# Patient Record
Sex: MALE | Race: WHITE | Employment: OTHER | URBAN - METROPOLITAN AREA
[De-identification: names, ages, dates, MRNs, and addresses within clinical notes are randomized per-mention and may not be internally consistent; named-entity substitution may affect disease eponyms.]

---

## 2017-04-28 ENCOUNTER — MYC MEDICAL ADVICE (OUTPATIENT)
Dept: FAMILY MEDICINE | Facility: CLINIC | Age: 67
End: 2017-04-28

## 2017-04-28 DIAGNOSIS — N50.811 TESTICULAR PAIN, RIGHT: Primary | ICD-10-CM

## 2017-05-01 NOTE — TELEPHONE ENCOUNTER
I see surgery in 04 on the left testicle to reduce a torsion, but do not see any notes regarding right testicular surgery.  Would you want to see patient in clinic or refer on?  Would this be a urology referral?  I have pended this.  Candie Merrill RN

## 2017-05-02 ENCOUNTER — PRE VISIT (OUTPATIENT)
Dept: UROLOGY | Facility: CLINIC | Age: 67
End: 2017-05-02

## 2017-05-02 ENCOUNTER — TELEPHONE (OUTPATIENT)
Dept: UROLOGY | Facility: CLINIC | Age: 67
End: 2017-05-02

## 2017-05-02 NOTE — TELEPHONE ENCOUNTER
Called and spoke to patient and spouse. Patient rescheduled to see Dr. Garland on 5/22/17 at 9:30am at [place].     PREVISIT INFORMATION                                                    Mauricio Barrera scheduled for future visit at [facility] specialty clinics.    Patient is scheduled to see Dr. Garland on 5/22/17 at 9:30am  Reason for visit: testicular pain, history of torsion  Referring provider: Leon Hinojosa MD  Has patient seen previous specialist? Yes. 15 year ago Clinic/Facility [facility] urology.   Medical Records:  some records available in EPIC. per patient, no outside records    REVIEW                                                      New patient packet mailed to patient: No  Medication reconciliation complete: No      Current Outpatient Prescriptions   Medication Sig Dispense Refill     acetaminophen (TYLENOL) 325 MG tablet Take 2 tablets (650 mg) by mouth every 4 hours as needed for mild pain 100 tablet 0     amLODIPine (NORVASC) 5 MG tablet Take 1 tablet (5 mg) by mouth daily 30 tablet 0     lisinopril (PRINIVIL,ZESTRIL) 10 MG tablet Take 1 tablet (10 mg) by mouth daily 90 tablet 3     simvastatin (ZOCOR) 20 MG tablet Take 1 tablet (20 mg) by mouth At Bedtime 90 tablet 3       Allergies: No known drug allergies    PLAN/FOLLOW-UP NEEDED                                                      Previsit review complete.  Patient will see provider at future scheduled appointment.       Haily Morales  General Surgery - Urology RN

## 2017-05-02 NOTE — TELEPHONE ENCOUNTER
[facility] Call Center    Phone Message    Name of Caller: Cruz    Phone Number: Home number on file [phone] (home)    Best time to return call: any    May a detailed message be left on voicemail: yes    Relation to patient: Self    Reason for Call: Other: Can patiemt be seen sooner in [place]?  Scheduled right now at [facility].  Was told to see Dada, not Ayla.  Testicular pain, seeing Dada at [facility], new, 6/2/17.  Please call patient to discuss.      Action Taken: Message routed to:  Adult Clinics: Urology p [id number]

## 2017-05-22 ENCOUNTER — OFFICE VISIT (OUTPATIENT)
Dept: UROLOGY | Facility: CLINIC | Age: 67
End: 2017-05-22
Payer: COMMERCIAL

## 2017-05-22 VITALS
DIASTOLIC BLOOD PRESSURE: 98 MMHG | OXYGEN SATURATION: 95 % | HEART RATE: 75 BPM | SYSTOLIC BLOOD PRESSURE: 149 MMHG | TEMPERATURE: 97.5 F

## 2017-05-22 DIAGNOSIS — N50.82 SCROTAL PAIN: Primary | ICD-10-CM

## 2017-05-22 PROCEDURE — 99203 OFFICE O/P NEW LOW 30 MIN: CPT | Performed by: UROLOGY

## 2017-05-22 ASSESSMENT — PAIN SCALES - GENERAL: PAINLEVEL: NO PAIN (0)

## 2017-05-22 NOTE — MR AVS SNAPSHOT
Mauricio Barrera     (MR # [id number])              After Visit Summary      Visit Information     Date & Time Provider Department Encounter #    5/22/2017  9:30 AM Vaughn Garland MD [facility] [id number]      Vitals  Most recent update: 5/22/2017  9:47 AM by Macie Moyer CMA    BP Pulse Temp SpO2          (!) 149/98 (BP Location: Left arm, Patient Position: Chair, Cuff Size: Adult Large) 75 97.5  F (36.4  C) (Oral) 95%        Reason for visit     Consult testicular pain    Reason for Visit History      Diagnoses        Codes Comments    Scrotal pain    -  Primary N50.82       Orders     Future Labs/Procedures Expected by Expires    US Testicular and Scrotum [PBE592 Custom]  As directed 5/22/2018      Appointments for Next 1 Year     None      Follow-up and Disposition     Return if symptoms worsen or fail to improve.    Routing History    Follow-up and Disposition History      Health Maintenance Due     Health Maintenance Due   Topic Date Due    A1C Q6 MO( NO INBASKET)  05/16/2017    LIPID MONITORING Q6 MO( NO INBASKET)  05/16/2017             Ongoing Health Issues     Problem Noted Resolved    Hyperlipidemia with target LDL less than 130[244307]      Hypertension goal BP (blood pressure) < 140/90[623596] 3/15/2011     Torsion, testicular[191056] 11/22/2013     Peripheral neuropathy (H)[524922] 3/6/2014     Toe ulcer (H)[623798] 3/11/2014     Impaired fasting glucose[790.21.ICD-9-CM] 3/11/2014     Obesity[830602] 3/12/2014     Advanced directives, counseling/discussion[962339] 1/19/2015     Foot osteomyelitis, right (H)[396111] 9/21/2016     Health Care Home[80665] 9/29/2016       Patient Instructions    Please call [phone] to schedule your ultrasound.         IMPORTANT INFORMATION  For all doctor appointments, please bring your medications in their original containers .   For all prescription refills please contact your pharmacy directly one week prior to your last dose and  request a refill. The pharmacy will then contact us.  If you have any lab tests ordered please call [phone] to schedule a lab appointment.  To contact [facility] call [phone] / for [facility] in [facility] call [phone] / for [facility] call [phone].    Pharmacy:    [facility] [id number] - [place], [place] - [address] [facility] [place], [place] - [address]  [facility] - [place], [place] - [address] [facility] [id number] [place], [place] - [address] [facility]    * * * * * * * PATIENT'S COPY* * * * * * * * * *

## 2017-05-22 NOTE — NURSING NOTE
"Mauricio Barrera's goals for this visit include:   Chief Complaint   Patient presents with     Consult     testicular pain       He requests these members of his care team be copied on today's visit information: PCP- YES     Referring Provider:  No referring provider defined for this encounter.    Initial BP (!) 149/98 (BP Location: Left arm, Patient Position: Chair, Cuff Size: Adult Large)  Pulse 75  Temp 97.5  F (36.4  C) (Oral)  SpO2 95% Estimated body mass index is 36.14 kg/(m^2) as calculated from the following:    Height as of 11/16/16: 1.969 m (6' 5.5\").    Weight as of 11/16/16: 140 kg (308 lb 12 oz).  BP completed using cuff size: X-large        "

## 2017-05-22 NOTE — PROGRESS NOTES
I am seeing Mauricio Barrera in consultation for evaluation of testis pain .    HPI:  Mauricio Barrera is a 66 year old male with history of right undescended testis.    Had right inguinal hernia repair age and orchidopexy age 14.    Also had history of torsion surgery in this testis, about 15 years ago.    About 3 weeks ago had intermittent moderate pain in the right testis that lasted 8-10d.  4-5d ago, pain was 1-2/10, twice that day.  Next day 2-3 episode mild.    Lasts 1-2 minutes.    Today feels fine, no pain at present.    REVIEW OF SYSTEMS:  General: negative  Skin: negative  Eyes: negative  Ears/Nose/Throat: negative  Respiratory: negative  Cardiovascular: negative  Gastrointestinal: negative  Genitourinary: see HPI  Musculoskeletal: negative  Neurologic: negative  Psychiatric: negative  Hematologic/Lymphatic/Immunologic: negative  Endocrine: negative    PAST MEDICAL HX:  Past Medical History:   Diagnosis Date     Calculus of gallbladder without mention of cholecystitis or obstruction 2006    screened,  neg for AAA,,Crtd stnss,PAD     Hyperlipidemia LDL goal < 130      Hypertension      Incidental lung nodule, > 3mm and < 8mm 2/04    3.5 mm nodule right lung base - stable by CT x 2 years     Lumbago        PAST SURG HX:  Past Surgical History:   Procedure Laterality Date     AMPUTATE FOOT Right 9/22/2016    Procedure: AMPUTATE FOOT;  Surgeon: Melchor Auguste DPM;  Location: [facility] OR     AMPUTATE FOOT Right 9/24/2016    Procedure: AMPUTATE FOOT;  Surgeon: Venkat Ramos DPM;  Location: [facility] OR     AMPUTATE TOE(S)  5/13/2014    Procedure: AMPUTATE TOE(S);  Surgeon: Venkat Ramos DPM;  Location: [facility]     C NONSPECIFIC PROCEDURE      tonsillectomy     C NONSPECIFIC PROCEDURE      hernia surgery     C NONSPECIFIC PROCEDURE  5/04    reduction torsion left testis     HC COLONOSCOPY THRU STOMA, DIAGNOSTIC  3/03    diverticulae,some inflamed,hemorrhoids     HEMORRHOIDECTOMY  4/07    single,anterior      IRRIGATION AND DEBRIDEMENT FOOT, COMBINED Right 9/22/2016    Procedure: COMBINED IRRIGATION AND DEBRIDEMENT FOOT;  Surgeon: Melchor Auguste DPM;  Location:  OR     REPAIR HAMMER TOE  5/13/2014    Procedure: REPAIR HAMMER TOE;  Surgeon: Venkat Ramos DPM;  Location: [facility]        FAMILY HX:  Family History   Problem Relation Age of Onset     C.A.D. Father      50's     CANCER Mother      Would not permit any exams or investigation,so dx inferred by family     Respiratory Brother      COPD, smoker     Family History Negative Brother        SOCIAL HX:  Social History   Substance Use Topics     Smoking status: Former Smoker     Types: Cigars     Quit date: 1/1/2004     Smokeless tobacco: Never Used     Alcohol use Yes      Comment: rarely        MEDICATIONS:  Current Outpatient Prescriptions   Medication Sig     acetaminophen (TYLENOL) 325 MG tablet Take 2 tablets (650 mg) by mouth every 4 hours as needed for mild pain     amLODIPine (NORVASC) 5 MG tablet Take 1 tablet (5 mg) by mouth daily     lisinopril (PRINIVIL,ZESTRIL) 10 MG tablet Take 1 tablet (10 mg) by mouth daily     simvastatin (ZOCOR) 20 MG tablet Take 1 tablet (20 mg) by mouth At Bedtime     No current facility-administered medications for this visit.      ALLERGIES:  No known drug allergies    GENERAL PHYSICAL EXAM:   BP (!) 149/98 (BP Location: Left arm, Patient Position: Chair, Cuff Size: Adult Large)  Pulse 75  Temp 97.5  F (36.4  C) (Oral)  SpO2 95%   Constitutional: No acute distress. Well nourished.   PSYCH: normal mood and affect.  NEURO: normal gait, no focal deficits.   EYES: anicteric, EOMI, PERR.  CARDIOPULMONARY: breathing non-labored, pulse regular rate/rhythm, no peripheral edema.  GI: Abdomen soft, non-tender, no organomegaly.  MUSCULOSKELETAL: normal limb proportions, no muscle wasting, no contractures.     EXAM:  Phallus  circumcised, meatus adequate, no plaques palpated.   Left testis descended , size is 16 , consistency  is normal . No intra-testicular masses.   Right testis descended , size is 20 , consistency is normal . No intra-testicular masses.   Epididymes present, non-tender, not-enlarged.   Left cord: Vas present. no varicocele noted.  Right cord: Vas present. no varicocele noted.     Prostate exam: deferred     Imaging/labs:  Lab Results   Component Value Date    CR 0.98 09/27/2016    CR 1.07 09/26/2016    CR 1.12 09/25/2016     Lab Results   Component Value Date    PSA 5.4 11/20/2015    PSA 4.24 11/04/2015       ASSESSMENT:     Resolved right testis pain, history of undescended testis, history of torsion.    Mild PSA elevation, he is observing.  Age-adjusted normal range is < 4.5.  Declines prostate biopsy at this time    PLAN:    Scrotal ultrasound.    Testicular self exam advised.    Recheck PSA in 6-12 months.    Vaughn Garland MD  Urological Surgeon

## 2017-05-22 NOTE — MR AVS SNAPSHOT
Patient's Medication List   [facility] in [place]       Patient:  JENARO LORA   :  1950   Physician:  Leon Hinojosa MD           This is your record.  Keep this with you and show to your community pharmacist(s) and physician(s) at each visit.     Allergies:  NO KNOWN DRUG ALLERGIES - (reactions not documented)               Medications  Valid as of: May 23, 2017 - 12:18 AM    Generic Name Brand Name Tablet Size Instructions for use    Acetaminophen TYLENOL 325 MG Take 2 tablets (650 mg) by mouth every 4 hours as needed for mild pain        AmLODIPine Besylate NORVASC 5 MG Take 1 tablet (5 mg) by mouth daily        Lisinopril PRINIVIL/ZESTRIL 10 MG Take 1 tablet (10 mg) by mouth daily        Simvastatin ZOCOR 20 MG Take 1 tablet (20 mg) by mouth At Bedtime        .           .           .           .

## 2017-06-22 ENCOUNTER — TELEPHONE (OUTPATIENT)
Dept: FAMILY MEDICINE | Facility: CLINIC | Age: 67
End: 2017-06-22

## 2017-06-22 NOTE — TELEPHONE ENCOUNTER
PATIENT COMPLAINS OF :  --Persistent chest congestion and episodic cough x 3 weeks.  --Feels more tired than usual but does not feel very sick.  --He is able to sleep.  --Drinking fluids well.  --Urine output ok.  --No fever.  --No colored mucus.  --No difficulty breathing.  --No chest pain.  --No pain.    PLAN :   --Because sx are persistent we did schedule an appointment for him with his primary for next week.  --He wanted this appointment for next week and will call if he does not need it.  --Both protocols recommended home care and call back if sx persist.  --Reviewed home care from protocol.    Protocol used - Cough.  Congestion.  Mandy Chavez Telephone Triage Protocols For Nurses 5th Edition guideline.    Sana Ambrosio RN

## 2017-06-28 ENCOUNTER — OFFICE VISIT (OUTPATIENT)
Dept: FAMILY MEDICINE | Facility: CLINIC | Age: 67
End: 2017-06-28
Payer: COMMERCIAL

## 2017-06-28 VITALS
OXYGEN SATURATION: 96 % | DIASTOLIC BLOOD PRESSURE: 88 MMHG | TEMPERATURE: 98 F | HEART RATE: 76 BPM | HEIGHT: 78 IN | BODY MASS INDEX: 34.57 KG/M2 | WEIGHT: 298.75 LBS | SYSTOLIC BLOOD PRESSURE: 128 MMHG

## 2017-06-28 DIAGNOSIS — J20.9 ACUTE BRONCHITIS, UNSPECIFIED ORGANISM: Primary | ICD-10-CM

## 2017-06-28 PROCEDURE — 99213 OFFICE O/P EST LOW 20 MIN: CPT | Performed by: FAMILY MEDICINE

## 2017-06-28 RX ORDER — AZITHROMYCIN 250 MG/1
TABLET, FILM COATED ORAL
Qty: 6 TABLET | Refills: 0 | Status: SHIPPED | OUTPATIENT
Start: 2017-06-28 | End: 2017-10-05

## 2017-06-28 NOTE — PROGRESS NOTES
SUBJECTIVE:                                                    Mauricio Barrera is a 66 year old male who presents to clinic today for the following health issues:    Acute Illness:    Acute illness concerns: cough  Onset: 3 weeks ago     Fever: YES- first week    Chills/Sweats: YES- sweats     Headache (location?): no    Sinus Pressure:YES- post-nasal drainage and facial pain    Conjunctivitis:  no    Ear Pain: no    Rhinorrhea: no     Congestion: YES    Sore Throat: YES- first day      Cough: YES-productive of clear sputum    Wheeze: no    Decreased Appetite: no    Nausea: no    Vomiting: no    Diarrhea:  no    Dysuria/Freq.: no    Fatigue/Achiness: YES- fatigue     Sick/Strep Exposure: no     Therapies Tried and outcome: daytime and night time and advil cold and sinus and throat lozenges   Additional: came back from [place] 3 months ago     Residual cough. Head is clearing.   Around 3 weeks ago symptoms started.   Still some sinus and ear symptoms.   No fever right now but first week - some on and off fever.   No medication. Using otc cough syrup.     Problem list and histories reviewed & adjusted, as indicated.  Additional history: as documented    Labs reviewed in EPIC.     Reviewed and updated as needed this visit by clinical staff    Reviewed and updated as needed this visit by Provider    Social History     Social History     Marital status:      Spouse name: Cathleen     Number of children: 1     Years of education: N/A     Social History Main Topics     Smoking status: Former Smoker     Types: Cigars     Quit date: 1/1/2004     Smokeless tobacco: Never Used     Alcohol use Yes      Comment: rarely      Drug use: No     Sexual activity: Yes     Partners: Female     Other Topics Concern      Service No     Blood Transfusions No     Caffeine Concern No     Occupational Exposure No     Hobby Hazards No     Sleep Concern No      irregular sleep      Stress Concern No     Weight Concern Yes      "Exercise Yes     10 times monthly     Seat Belt Yes     Self-Exams No     Parent/Sibling W/ Cabg, Mi Or Angioplasty Before 65f 55m? No     Social History Narrative    Balanced Diet - Yes    Osteoporosis Preventative measures-  Dairy servings per day: 2    Regular Exercise -  yes Describe walking 4 times per week    Dental Exam up - YES - Date: 3/07    Eye Exam -5/07    Self Testicular Exam -  No    Do you have any concerns about STD's -  No    Abuse: Current or Past (Physical, Sexual or Emotional)- No    Do you feel safe in your environment - Yes    Guns stored in the home - No    Sunscreen used - yes    Seatbelts used - Yes    Lipids - 04/07    Glucose -  4/07    Colon Cancer Screening - Colonoscopy 03-(date completed)    Hemoccults - 02-192003    PSA - 2005    Digital Rectal Exam - 02-    Immunizations reviewed and up to date - yes,utd    RYAN Murrieta     Allergies   Allergen Reactions     No Known Drug Allergies      Patient Active Problem List   Diagnosis     Hyperlipidemia with target LDL less than 130     Hypertension goal BP (blood pressure) < 140/90     Torsion, testicular     Peripheral neuropathy (H)     Toe ulcer (H)     Impaired fasting glucose     Obesity     Advanced directives, counseling/discussion     Foot osteomyelitis, right (H)     Health Care Home     Reviewed medications, social history and  past medical and surgical history.    Review of system: for general, respiratory, CVS, GI and psychiatry negative except for noted above.     EXAM:  /88 (Cuff Size: Adult Large)  Pulse 76  Temp 98  F (36.7  C) (Oral)  Ht 6' 5.5\" (1.969 m)  Wt 298 lb 12 oz (135.5 kg)  SpO2 96%  BMI 34.97 kg/m2  Constitutional: healthy, alert and no distress   Psychiatric: mentation appears normal and affect normal/bright  Cardiovascular: RRR. No murmurs,  Respiratory: negative, Lungs clear. No crackles or wheezing. No tachypnea.     ENT: Both TM exam normal, minimal cervical lymphadenopathy. " Mild maxillary sinus tenderness.        ASSESSMENT / PLAN:  (J20.9) Acute bronchitis, unspecified organism  (primary encounter diagnosis)  Comment: from symptoms. Start with antibiotics and if not improving, OK to call for prednisone. Side effects discussed. Discussed when to follow up.  Plan: azithromycin (ZITHROMAX) 250 MG tablet

## 2017-06-28 NOTE — NURSING NOTE
"Chief Complaint   Patient presents with     Cough       Initial /88 (Cuff Size: Adult Large)  Pulse 76  Temp 98  F (36.7  C) (Oral)  Ht 6' 5.5\" (1.969 m)  Wt 298 lb 12 oz (135.5 kg)  SpO2 96%  BMI 34.97 kg/m2 Estimated body mass index is 34.97 kg/(m^2) as calculated from the following:    Height as of this encounter: 6' 5.5\" (1.969 m).    Weight as of this encounter: 298 lb 12 oz (135.5 kg).  Medication Reconciliation: complete     Anita Hunt, AGNES      "

## 2017-06-28 NOTE — MR AVS SNAPSHOT
"              After Visit Summary   6/28/2017    Mauricio Barrera    MRN: [id number]           Patient Information     Date Of Birth          1950        Visit Information        Provider Department      6/28/2017 1:20 PM Leon Hinojosa MD [facility]        Today's Diagnoses     Acute bronchitis, unspecified organism    -  1       Follow-ups after your visit        Who to contact     If you have questions or need follow up information about today's clinic visit or your schedule please contact [facility] directly at [phone].  Normal or non-critical lab and imaging results will be communicated to you by Icinetichart, letter or phone within 4 business days after the clinic has received the results. If you do not hear from us within 7 days, please contact the clinic through Icinetichart or phone. If you have a critical or abnormal lab result, we will notify you by phone as soon as possible.  Submit refill requests through Nomadesk or call your pharmacy and they will forward the refill request to us. Please allow 3 business days for your refill to be completed.          Additional Information About Your Visit        MyChart Information     [employer] gives you secure access to your electronic health record. If you see a primary care provider, you can also send messages to your care team and make appointments. If you have questions, please call your primary care clinic.  If you do not have a primary care provider, please call [phone] and they will assist you.        Care EveryWhere ID     This is your Care EveryWhere ID. This could be used by other organizations to access your [place] medical records  [id number]        Your Vitals Were     Pulse Temperature Height Pulse Oximetry BMI (Body Mass Index)       76 98  F (36.7  C) (Oral) 6' 5.5\" (1.969 m) 96% 34.97 kg/m2        Blood Pressure from Last 3 Encounters:   06/28/17 128/88   05/22/17 (!) 149/98   11/16/16 132/86    Weight " from Last 3 Encounters:   06/28/17 298 lb 12 oz (135.5 kg)   11/16/16 (!) 308 lb 12 oz (140 kg)   11/16/16 295 lb (133.8 kg)              Today, you had the following     No orders found for display         Today's Medication Changes          These changes are accurate as of: 6/28/17 11:59 PM.  If you have any questions, ask your nurse or doctor.               Start taking these medicines.        Dose/Directions    azithromycin 250 MG tablet   Commonly known as:  ZITHROMAX   Used for:  Acute bronchitis, unspecified organism   Started by:  Leon Hinojosa MD        Two tablets first day, then one tablet daily for four days.   Quantity:  6 tablet   Refills:  0            Where to get your medicines      These medications were sent to [facility] [place] [address]  [address] [place], [place] [address]     Phone:  [phone]     azithromycin 250 MG tablet                Primary Care Provider Office Phone # Fax #    Leon Hinojosa [phone]       [address] [address]        Equal Access to Services     ALEXSANDRA ALLEN AH: Hadii melvin phano Sojusten, waaxda lujacquesadaha, qaybta kaalmada adefloridayada, nancy abbott . So [facility] [phone].    ATENCIÓN: Si habla español, tiene a crawford disposición servicios gratuitos de asistencia lingüística. Llame al [phone].    We comply with applicable federal civil rights laws and [place] laws. We do not discriminate on the basis of race, color, national origin, age, disability sex, sexual orientation or gender identity.            Thank you!     Thank you for choosing [facility]  for your care. Our goal is always to provide you with excellent care. Hearing back from our patients is one way we can continue to improve our services. Please take a few minutes to complete the written survey that you may receive in the mail after your  visit with us. Thank you!             Your Updated Medication List - Protect others around you: Learn how to safely use, store and throw away your medicines at www.disposemymeds.org.          This list is accurate as of: 6/28/17 11:59 PM.  Always use your most recent med list.                   Brand Name Dispense Instructions for use Diagnosis    acetaminophen 325 MG tablet    TYLENOL    100 tablet    Take 2 tablets (650 mg) by mouth every 4 hours as needed for mild pain    Osteomyelitis of right foot, unspecified chronicity       amLODIPine 5 MG tablet    NORVASC    30 tablet    Take 1 tablet (5 mg) by mouth daily    Osteomyelitis of right foot, unspecified chronicity       azithromycin 250 MG tablet    ZITHROMAX    6 tablet    Two tablets first day, then one tablet daily for four days.    Acute bronchitis, unspecified organism       lisinopril 10 MG tablet    PRINIVIL/ZESTRIL    90 tablet    Take 1 tablet (10 mg) by mouth daily    Hypertension goal BP (blood pressure) < 140/90       simvastatin 20 MG tablet    ZOCOR    90 tablet    Take 1 tablet (20 mg) by mouth At Bedtime    Hyperlipidemia LDL goal < 130

## 2017-10-05 ENCOUNTER — OFFICE VISIT (OUTPATIENT)
Dept: OPHTHALMOLOGY | Facility: CLINIC | Age: 67
End: 2017-10-05
Attending: OPHTHALMOLOGY
Payer: COMMERCIAL

## 2017-10-05 ENCOUNTER — OFFICE VISIT (OUTPATIENT)
Dept: FAMILY MEDICINE | Facility: CLINIC | Age: 67
End: 2017-10-05
Payer: COMMERCIAL

## 2017-10-05 VITALS
HEART RATE: 78 BPM | DIASTOLIC BLOOD PRESSURE: 66 MMHG | WEIGHT: 306 LBS | BODY MASS INDEX: 35.82 KG/M2 | SYSTOLIC BLOOD PRESSURE: 116 MMHG | OXYGEN SATURATION: 95 % | TEMPERATURE: 98.2 F

## 2017-10-05 DIAGNOSIS — H43.811 PVD (POSTERIOR VITREOUS DETACHMENT), RIGHT: Primary | ICD-10-CM

## 2017-10-05 DIAGNOSIS — H53.8 FLASHING LIGHTS: ICD-10-CM

## 2017-10-05 DIAGNOSIS — I10 HYPERTENSION GOAL BP (BLOOD PRESSURE) < 140/90: ICD-10-CM

## 2017-10-05 DIAGNOSIS — H43.393 FLOATER, VITREOUS, BILATERAL: Primary | ICD-10-CM

## 2017-10-05 PROCEDURE — 99213 OFFICE O/P EST LOW 20 MIN: CPT | Mod: ZF

## 2017-10-05 PROCEDURE — 99214 OFFICE O/P EST MOD 30 MIN: CPT | Performed by: FAMILY MEDICINE

## 2017-10-05 ASSESSMENT — VISUAL ACUITY
OD_CC: 20/30
OS_CC: 20/30
METHOD: SNELLEN - LINEAR
OS_CC+: -2
CORRECTION_TYPE: GLASSES

## 2017-10-05 ASSESSMENT — SLIT LAMP EXAM - LIDS
COMMENTS: NORMAL
COMMENTS: NORMAL

## 2017-10-05 ASSESSMENT — REFRACTION_WEARINGRX
OS_SPHERE: PLANO
OD_AXIS: 051
OD_CYLINDER: +0.75
OD_SPHERE: +1.00
OS_AXIS: 115
OS_ADD: +2.50
OD_ADD: +2.50
OS_CYLINDER: +0.25

## 2017-10-05 ASSESSMENT — CONF VISUAL FIELD
OS_NORMAL: 1
OD_NORMAL: 1

## 2017-10-05 ASSESSMENT — CUP TO DISC RATIO
OD_RATIO: 0.2
OS_RATIO: 0.2

## 2017-10-05 ASSESSMENT — EXTERNAL EXAM - LEFT EYE: OS_EXAM: NORMAL

## 2017-10-05 ASSESSMENT — TONOMETRY
IOP_METHOD: ICARE
OS_IOP_MMHG: 12
OD_IOP_MMHG: 10

## 2017-10-05 ASSESSMENT — EXTERNAL EXAM - RIGHT EYE: OD_EXAM: NORMAL

## 2017-10-05 NOTE — NURSING NOTE
Chief Complaints and History of Present Illnesses   Patient presents with     Eye Problem     new pt     HPI    Symptoms:              Comments:  Mauricio is a 66 year old male presenting with a history of:    1. Subjective visual disturbance  Onset yesterday. Started with single black spot on the left side, and a line going through the right eye. Last night, flashes developed. Now has an increase in floaters in natural daylight. New development.   Right eye still has the occasional lines that float through (intermittent). Flashes were episodic to last night only. Resolved.     Yolanda GONZALEZ 3:36 PM October 5, 2017

## 2017-10-05 NOTE — MR AVS SNAPSHOT
After Visit Summary   10/5/2017    Mauricio Barrera    MRN: [id number]           Patient Information     Date Of Birth          1950        Visit Information        Provider Department      10/5/2017 3:00 PM Teddy Germain MD [facility]        Today's Diagnoses     PVD (posterior vitreous detachment), right    -  1       Follow-ups after your visit        Follow-up notes from your care team     Return in about 4 weeks (around 11/2/2017) for PVD.      Your next 10 appointments already scheduled     Nov 15, 2017  9:00 AM CST   PHYSICAL with Leon Hinojosa MD   [facility] ([facility])    [address] [place] [address] [phone]              Who to contact     Please call your clinic at [phone] to:    Ask questions about your health    Make or cancel appointments    Discuss your medicines    Learn about your test results    Speak to your doctor   If you have compliments or concerns about an experience at your clinic, or if you wish to file a complaint, please contact [facility] Patient Relations at [phone] or email us at [email or web address]         Additional Information About Your Visit        MyChart Information     [employer] gives you secure access to your electronic health record. If you see a primary care provider, you can also send messages to your care team and make appointments. If you have questions, please call your primary care clinic.  If you do not have a primary care provider, please call [phone] and they will assist you.      [employer] is an electronic gateway that provides easy, online access to your medical records. With [employer], you can request a clinic appointment, read your test results, renew a prescription or communicate with your care team.     To access your existing account, please contact your [facility] or call  [phone] for assistance.        Care EveryWhere ID     This is your Care EveryWhere ID. This could be used by other organizations to access your [facility] medical records  [id number]         Blood Pressure from Last 3 Encounters:   10/05/17 116/66   06/28/17 128/88   05/22/17 (!) 149/98    Weight from Last 3 Encounters:   10/05/17 (!) 138.8 kg (306 lb)   06/28/17 135.5 kg (298 lb 12 oz)   11/16/16 (!) 140 kg (308 lb 12 oz)              Today, you had the following     No orders found for display       Primary Care Provider Office Phone # Fax #    Leon [place] Hinojosa [phone] [address] [place] [address]        Equal Access to Services     XUAN [facility] HospitalDEMET[facility] : Krzysztof boone Sojusten, waaxda luqadaha, qaybta kaalmada ely, nancy abbott . So [facility] [phone].    ATENCIÓN: Si habla español, tiene a crawford disposición servicios gratuitos de asistencia lingüística. Herb al [phone].    We comply with applicable federal civil rights laws and [place] laws. We do not discriminate on the basis of race, color, national origin, age, disability, sex, sexual orientation, or gender identity.            Thank you!     Thank you for choosing EYE CLINIC  for your care. Our goal is always to provide you with excellent care. Hearing back from our patients is one way we can continue to improve our services. Please take a few minutes to complete the written survey that you may receive in the mail after your visit with us. Thank you!             Your Updated Medication List - Protect others around you: Learn how to safely use, store and throw away your medicines at www.disposemymeds.org.          This list is accurate as of: 10/5/17  3:54 PM.  Always use your most recent med list.                   Brand Name Dispense Instructions for use Diagnosis    lisinopril 10 MG tablet    PRINIVIL/ZESTRIL    90 tablet    Take 1 tablet (10 mg) by mouth daily     Hypertension goal BP (blood pressure) < 140/90       simvastatin 20 MG tablet    ZOCOR    90 tablet    Take 1 tablet (20 mg) by mouth At Bedtime    Hyperlipidemia LDL goal < 130

## 2017-10-05 NOTE — MR AVS SNAPSHOT
After Visit Summary   10/5/2017    Mauricio Barrera    MRN: [id number]           Patient Information     Date Of Birth          1950        Visit Information        Provider Department      10/5/2017 11:40 AM [facility]        Today's Diagnoses     Floater, vitreous, bilateral    -  1    Flashing lights           Follow-ups after your visit        Additional Services     OPHTHALMOLOGY ADULT REFERRAL       Your provider has referred you to: [facility]: [facility] [phone]   [email or web address]/[email or web address]/  [facility]: [facility] [phone]   [email or web address]/[email or web address]/  N: [facility], ALBARO Sarah [phone] [email or web address]  N: [facility] [phone]  N: [facility] ALBARO Lam [phone]   [email or web address]  N: [facility] [place] [phone]   [email or web address]    Please be aware that coverage of these services is subject to the terms and limitations of your health insurance plan.  Call member services at your health plan with any benefit or coverage questions.      Please bring the following with you to your appointment:    (1) Any X-Rays, CTs or MRIs which have been performed.  Contact the facility where they were done to arrange for  prior to your scheduled appointment.    (2) List of current medications  (3) This referral request   (4) Any documents/labs given to you for this referral                  Your next 10 appointments already scheduled     Oct 05, 2017  3:00 PM CDT   NEW RETINA with Teddy Germain MD   Eye Clinic ([facility])    [address]  [address]  [address]  [facility] [address]   [phone]            Nov 15, 2017  9:00 AM CST   PHYSICAL  with Leon Hinojosa MD   [facility] ([facility])    [address] [place] [address] [phone]              Who to contact     If you have questions or need follow up information about today's clinic visit or your schedule please contact [facility] directly at [phone].  Normal or non-critical lab and imaging results will be communicated to you by MyChart, letter or phone within 4 business days after the clinic has received the results. If you do not hear from us within 7 days, please contact the clinic through [employer] or phone. If you have a critical or abnormal lab result, we will notify you by phone as soon as possible.  Submit refill requests through [employer] or call your pharmacy and they will forward the refill request to us. Please allow 3 business days for your refill to be completed.          Additional Information About Your Visit        [employer] Information     [employer] gives you secure access to your electronic health record. If you see a primary care provider, you can also send messages to your care team and make appointments. If you have questions, please call your primary care clinic.  If you do not have a primary care provider, please call [phone] and they will assist you.        Care EveryWhere ID     This is your Care EveryWhere ID. This could be used by other organizations to access your [facility] medical records  [id number]        Your Vitals Were     Pulse Temperature Pulse Oximetry BMI (Body Mass Index)          78 98.2  F (36.8  C) (Oral) 95% 35.82 kg/m2         Blood Pressure from Last 3 Encounters:   10/05/17 116/66   06/28/17 128/88   05/22/17 (!) 149/98    Weight from Last 3 Encounters:   10/05/17 (!) 306 lb (138.8 kg)   06/28/17 298 lb 12 oz (135.5 kg)   11/16/16 (!) 308 lb 12 oz (140 kg)              We Performed the Following     OPHTHALMOLOGY ADULT REFERRAL        Primary Care Provider Office Phone #  Fax #    Leon [place] Hinojosa [phone] [address] [place] [address]        Equal Access to Services     ALEXSANDRA ALLEN : Hadii aad ku hadandreytaylor Lobo, waartieda luqadaha, qaivetteta kaalmada ely, nancy sellerselaine middletonflorida vasquez scar elizabeth. So [facility] [phone].    ATENCIÓN: Si habla español, tiene a crawford disposición servicios gratuitos de asistencia lingüística. Llame al [phone].    We comply with applicable federal civil rights laws and [place] laws. We do not discriminate on the basis of race, color, national origin, age, disability, sex, sexual orientation, or gender identity.            Thank you!     Thank you for choosing [facility]  for your care. Our goal is always to provide you with excellent care. Hearing back from our patients is one way we can continue to improve our services. Please take a few minutes to complete the written survey that you may receive in the mail after your visit with us. Thank you!             Your Updated Medication List - Protect others around you: Learn how to safely use, store and throw away your medicines at www.disposemymeds.org.          This list is accurate as of: 10/5/17 12:17 PM.  Always use your most recent med list.                   Brand Name Dispense Instructions for use Diagnosis    lisinopril 10 MG tablet    PRINIVIL/ZESTRIL    90 tablet    Take 1 tablet (10 mg) by mouth daily    Hypertension goal BP (blood pressure) < 140/90       simvastatin 20 MG tablet    ZOCOR    90 tablet    Take 1 tablet (20 mg) by mouth At Bedtime    Hyperlipidemia LDL goal < 130

## 2017-10-05 NOTE — PROGRESS NOTES
I have confirmed the patient's and reviewed Past Medical History, Past Surgical History, Social History, Family History, Problem List, Medication List and agree with Tech note.    CC: floaters right eye     HPI: new onset since yesterday     Assessment/plan:   1.  Posterior vitreous detachment (PVD) right eye    - Informed patient    - Retinal detachment/retinal tear precautions discussed with patient  Contact clinic immediately for new floaters/flashers or shadows in vision     2.  Nuclear sclerotic cataract  Both eyes    - Not visually significant   - refract as needed    - new prescription glasses issued    - monitor yearly     RTC one month     Teddy Gifford MD PhD.  Professor & Chair

## 2017-10-05 NOTE — PROGRESS NOTES
SUBJECTIVE:   Mauricio Barrera is a 66 year old male who presents to clinic today for the following health issues:      Eye(s) Problem      Duration: yesterday    Description:  Location: both eyes  Pain: no   Redness: YES  Discharge: no     Accompanying signs and symptoms: none    History (Trauma, foreign body exposure,): None    Precipitating or alleviating factors (contact use): None    Therapies tried and outcome: eye drops not helping    Has developed floaters, right eye right eye flashes of light started yesterday . Took eye drops from wife not sure what it was. Felt a gnat in left eye, but right eye like a microscopic hair floating. Took eye drops and went to bed and woke up but symptoms remained. Also noted Right side of right eye after [place] from [place] felt like there was blinking light like someone in a car behind him or in a passenger seat but  then realized when no traffic behind and no one behind him it was his eye. Looked up online and found floaters and read up about it , felt is not the  worse case   No flashing today , was episodic last night. Today feeling bubbles every where not seen in clinic but in natural light sees bubbles across whole spectrum tiny floaters, reports sees me fine. He drove himself here    Buys meds in [place]. Lives in [place] about 4 to 7 months of the year. Has a dentist there and sees dr nair here and will be coming in nov to get a physical done and labs then  Reports prediabetic and does not have diabetes.   In [place] 4 months and 3 months in summer     Problem list and histories reviewed & adjusted, as indicated.  Additional history: as documented    Patient Active Problem List   Diagnosis     Hyperlipidemia with target LDL less than 130     Hypertension goal BP (blood pressure) < 140/90     Torsion, testicular     Peripheral neuropathy     Toe ulcer (H)     Impaired fasting glucose     Obesity     Advanced directives, counseling/discussion     Foot  osteomyelitis, right (H)     Health Care Home     Past Surgical History:   Procedure Laterality Date     AMPUTATE FOOT Right 9/22/2016    Procedure: AMPUTATE FOOT;  Surgeon: Melchor Auguste DPM;  Location: [facility] OR     AMPUTATE FOOT Right 9/24/2016    Procedure: AMPUTATE FOOT;  Surgeon: Venkat Ramos DPM;  Location: [facility] OR     AMPUTATE TOE(S)  5/13/2014    Procedure: AMPUTATE TOE(S);  Surgeon: Venkat Ramos DPM;  [facility]     C NONSPECIFIC PROCEDURE      tonsillectomy     C NONSPECIFIC PROCEDURE      hernia surgery     C NONSPECIFIC PROCEDURE  5/04    reduction torsion left testis     [facility] COLONOSCOPY THRU STOMA, DIAGNOSTIC  3/03    diverticulae,some inflamed,hemorrhoids     HEMORRHOIDECTOMY  4/07    single,anterior     IRRIGATION AND DEBRIDEMENT FOOT, COMBINED Right 9/22/2016    Procedure: COMBINED IRRIGATION AND DEBRIDEMENT FOOT;  Surgeon: Melchor Auguste DPM;  Location:  OR     REPAIR HAMMER TOE  5/13/2014    Procedure: REPAIR HAMMER TOE;  Surgeon: Venkat Ramos DPM;  Location: [facility]       Social History   Substance Use Topics     Smoking status: Former Smoker     Types: Cigars     Quit date: 1/1/2004     Smokeless tobacco: Never Used     Alcohol use Yes      Comment: rarely      Family History   Problem Relation Age of Onset     CANCER Mother      Would not permit any exams or investigation,so dx inferred by family     C.A.D. Father      50's     Respiratory Brother      COPD, smoker     Family History Negative Brother          Current Outpatient Prescriptions   Medication Sig Dispense Refill     lisinopril (PRINIVIL,ZESTRIL) 10 MG tablet Take 1 tablet (10 mg) by mouth daily 90 tablet 3     simvastatin (ZOCOR) 20 MG tablet Take 1 tablet (20 mg) by mouth At Bedtime 90 tablet 3     Allergies   Allergen Reactions     No Known Drug Allergies      Recent Labs   Lab Test  11/16/16   1659  09/27/16   0652  09/26/16   1453  09/26/16   0625   11/04/15   1213  11/19/14   0939   11/27/13    0902   A1C  5.8   --    --   6.4*   --   6.0   --    < >   --    LDL  87   --    --    --    --   107  71   < >  145*   HDL  32*   --    --    --    --   34*  41   < >  32*   TRIG  287*   --    --    --    --   394*  257*   < >  284*   ALT   --    --    --    --    --   34  28   --   46   CR   --   0.98   --   1.07   < >  0.68  0.66   < >  0.73   GFRESTIMATED   --   76   --   69   < >  >90  Non  GFR Calc    >90  Non  GFR Calc     < >  >90   GFRESTBLACK   --   >90   GFR Calc     --   83   < >  >90   GFR Calc    >90   GFR Calc     < >  >90   POTASSIUM   --    --   3.4  3.2*   < >  3.8  3.3*   < >  3.5    < > = values in this interval not displayed.      BP Readings from Last 3 Encounters:   10/05/17 116/66   06/28/17 128/88   05/22/17 (!) 149/98    Wt Readings from Last 3 Encounters:   10/05/17 (!) 306 lb (138.8 kg)   06/28/17 298 lb 12 oz (135.5 kg)   11/16/16 (!) 308 lb 12 oz (140 kg)                  Labs reviewed in EPIC          Reviewed and updated as needed this visit by clinical staff     Reviewed and updated as needed this visit by Provider         ROS:  Constitutional, HEENT, cardiovascular, pulmonary, GI, , musculoskeletal, neuro, skin, endocrine and psych systems are negative, except as otherwise noted.      OBJECTIVE:   /66  Pulse 78  Temp 98.2  F (36.8  C) (Oral)  Wt (!) 306 lb (138.8 kg)  SpO2 95%  BMI 35.82 kg/m2  Body mass index is 35.82 kg/(m^2).  GENERAL: healthy, alert and no distress  EYES: Eyes grossly normal to inspection, PERRL and conjunctivae and sclerae normal. Mild decreased in left upper  Visual field accuracy though on repeat was able to tell number of fingers   HENT: ear canals and TM's normal, nose and mouth without ulcers or lesions  NECK: no adenopathy, no asymmetry, masses, or scars and thyroid normal to palpation  RESP: lungs clear to auscultation - no rales, rhonchi or wheezes  CV: regular  rates and rhythm and normal S1 S2, no S3 or S4  NEURO: Normal strength and tone, mentation intact and speech normal  PSYCH: mentation appears normal, affect normal/bright    Diagnostic Test Results:  No results found for this or any previous visit (from the past 24 hour(s)).    ASSESSMENT/PLAN:   Hx of obesity, HLD on simvastatin, impaired fasting glucose, HTN on lisinopril, peripheral neuropathy, prior toe ulcer and foot osteomyelitis, s/p amputation toe and foot , hammer toe repair hx,  testicular torsion s/p surgical reduction, prior tonsillectomy, hernia surgery, diverticula and hemorrhoids seen on colonoscopy here for     1. Floater, vitreous, bilateral  suspicious for impending retinal detachment with flashes of light will send to eye doctor right away today for slit lamp exam .   - OPHTHALMOLOGY ADULT REFERRAL    2. Flashing lights  As above  - OPHTHALMOLOGY ADULT REFERRAL    3. Hypertension goal BP (blood pressure) < 140/90  BP at goal, does not seem related to current symptoms      See Patient Instructions    Atiya Smith MD  [facility]

## 2017-10-24 ENCOUNTER — OFFICE VISIT (OUTPATIENT)
Dept: FAMILY MEDICINE | Facility: CLINIC | Age: 67
End: 2017-10-24
Payer: COMMERCIAL

## 2017-10-24 ENCOUNTER — TELEPHONE (OUTPATIENT)
Dept: FAMILY MEDICINE | Facility: CLINIC | Age: 67
End: 2017-10-24

## 2017-10-24 VITALS
SYSTOLIC BLOOD PRESSURE: 131 MMHG | DIASTOLIC BLOOD PRESSURE: 94 MMHG | WEIGHT: 302 LBS | HEART RATE: 78 BPM | BODY MASS INDEX: 35.35 KG/M2 | OXYGEN SATURATION: 96 % | RESPIRATION RATE: 14 BRPM | TEMPERATURE: 98.5 F

## 2017-10-24 DIAGNOSIS — R14.3 FLATULENCE, ERUCTATION AND GAS PAIN: ICD-10-CM

## 2017-10-24 DIAGNOSIS — R14.2 FLATULENCE, ERUCTATION AND GAS PAIN: ICD-10-CM

## 2017-10-24 DIAGNOSIS — R73.01 IMPAIRED FASTING GLUCOSE: ICD-10-CM

## 2017-10-24 DIAGNOSIS — I10 HYPERTENSION GOAL BP (BLOOD PRESSURE) < 140/90: ICD-10-CM

## 2017-10-24 DIAGNOSIS — R14.1 FLATULENCE, ERUCTATION AND GAS PAIN: ICD-10-CM

## 2017-10-24 DIAGNOSIS — E78.5 HYPERLIPIDEMIA WITH TARGET LDL LESS THAN 130: ICD-10-CM

## 2017-10-24 DIAGNOSIS — R19.7 DIARRHEA, UNSPECIFIED TYPE: Primary | ICD-10-CM

## 2017-10-24 DIAGNOSIS — G60.9 IDIOPATHIC PERIPHERAL NEUROPATHY: ICD-10-CM

## 2017-10-24 DIAGNOSIS — R10.84 ABDOMINAL PAIN, GENERALIZED: ICD-10-CM

## 2017-10-24 PROCEDURE — 99213 OFFICE O/P EST LOW 20 MIN: CPT | Performed by: FAMILY MEDICINE

## 2017-10-24 NOTE — MR AVS SNAPSHOT
After Visit Summary   10/24/2017    Mauricio Barrera    MRN: [id number]           Patient Information     Date Of Birth          1950        Visit Information        Provider Department      10/24/2017 2:00 PM Marichuy Zhu MD [facility]        Today's Diagnoses     Diarrhea, unspecified type    -  1    Abdominal pain, generalized        Flatulence, eructation and gas pain        Hypertension goal BP (blood pressure) < 140/90        Hyperlipidemia with target LDL less than 130        Impaired fasting glucose        Idiopathic peripheral neuropathy          Care Instructions    Continue to monitor your symptoms. If you notice any worsening or new symptoms, let us know.          Follow-ups after your visit        Your next 10 appointments already scheduled     Nov 15, 2017  9:00 AM CST   PHYSICAL with Leon Hinojosa MD   [facility] ([facility])    [address] [place] [address] [phone]            Nov 16, 2017  2:15 PM CST   RETURN RETINA with Teddy Germain MD   Eye Clinic ([facility])    [address] [place] [id number] [phone]              Who to contact     If you have questions or need follow up information about today's clinic visit or your schedule please contact [facility] directly at [phone].  Normal or non-critical lab and imaging results will be communicated to you by MyChart, letter or phone within 4 business days after the clinic has received the results. If you do not hear from us within 7 days, please contact the clinic through MyChart or phone. If you have a critical or abnormal lab result, we will notify you by phone as soon as possible.  Submit refill requests through [employer] or call your pharmacy and they will forward the refill request to us. Please allow 3 business days for your refill to be  completed.          Additional Information About Your Visit        MyChart Information     [employer] gives you secure access to your electronic health record. If you see a primary care provider, you can also send messages to your care team and make appointments. If you have questions, please call your primary care clinic.  If you do not have a primary care provider, please call [phone] and they will assist you.        Care EveryWhere ID     This is your Care EveryWhere ID. This could be used by other organizations to access your [facility] medical records  [id number]        Your Vitals Were     Pulse Temperature Respirations Pulse Oximetry BMI (Body Mass Index)       78 98.5  F (36.9  C) (Oral) 14 96% 35.35 kg/m2        Blood Pressure from Last 3 Encounters:   10/24/17 (!) 131/94   10/05/17 116/66   06/28/17 128/88    Weight from Last 3 Encounters:   10/24/17 (!) 302 lb (137 kg)   10/05/17 (!) 306 lb (138.8 kg)   06/28/17 298 lb 12 oz (135.5 kg)              Today, you had the following     No orders found for display       Primary Care Provider Office Phone # Fax #    Leon [place] Hinojosa [phone]       [facility] [address] [place], [place] [id number]        Equal Access to Services     ALEXSANDRA ALLEN : Hadii melvin phano Sovinayakali, waaxda luqadaha, qaybta kaalmada adeegyada, nancy elizabeth. So [facility] [phone].    ATENCIÓN: Si habla español, tiene a crawford disposición servicios gratuitos de asistencia lingüística. Llame al [phone].    We comply with applicable federal civil rights laws and [place] laws. We do not discriminate on the basis of race, color, national origin, age, disability, sex, sexual orientation, or gender identity.            Thank you!     Thank you for choosing [facility]  for your care. Our goal is always to provide you with excellent care. Hearing back from our patients is one way we can continue to improve our services. Please take  a few minutes to complete the written survey that you may receive in the mail after your visit with us. Thank you!             Your Updated Medication List - Protect others around you: Learn how to safely use, store and throw away your medicines at www.disposemymeds.org.          This list is accurate as of: 10/24/17  3:18 PM.  Always use your most recent med list.                   Brand Name Dispense Instructions for use Diagnosis    lisinopril 10 MG tablet    PRINIVIL/ZESTRIL    90 tablet    Take 1 tablet (10 mg) by mouth daily    Hypertension goal BP (blood pressure) < 140/90       simvastatin 20 MG tablet    ZOCOR    90 tablet    Take 1 tablet (20 mg) by mouth At Bedtime    Hyperlipidemia LDL goal < 130

## 2017-10-24 NOTE — NURSING NOTE
"Chief Complaint   Patient presents with     Abdominal Pain       Initial BP (!) 131/94  Pulse 78  Temp 98.5  F (36.9  C) (Oral)  Resp 14  Wt (!) 302 lb (137 kg)  SpO2 96%  BMI 35.35 kg/m2 Estimated body mass index is 35.35 kg/(m^2) as calculated from the following:    Height as of 6/28/17: 6' 5.5\" (1.969 m).    Weight as of this encounter: 302 lb (137 kg).  Medication Reconciliation: complete     Diane Christian MA     "

## 2017-10-24 NOTE — PROGRESS NOTES
"  SUBJECTIVE:   Mauricio Barrera is a 66 year old male who presents to clinic today for the following health issues:    ABDOMINAL PAIN     Onset: 2 months ago     Description:   Character: Sharp  Location: lower abdominal area   Radiation: None    Intensity: moderate    Progression of Symptoms:  constant    Accompanying Signs & Symptoms:  Fever/Chills?: no   Gas/Bloating: YES  Nausea: no   Vomitting: YES  Diarrhea?: YES  Constipation:no   Dysuria or Hematuria: no    History:   Trauma: no   Previous similar pain: yes    Previous tests done: none    Precipitating factors:   Does the pain change with:     Food: no      BM: YES- sometimes     Urination: no     Alleviating factors:  Gas related     Therapies Tried and outcome: none     LMP:  not applicable     Telephone encounter today:  \"Description:  Patient started with abdominal pain today - diarrhea and dry heaves - is calling nurse triage for advice - states he is currently in home resting took some pepto bismol  Onset/duration:  10/24/2017 but abdominal discomfort 6 weeks  Precip. factors:  Patient states hx gas/bloating  Associated symptoms:    1. Abdominal pain  - lower below belly button - center -  \"last 6 weeks - and it is related to gas - it is a stronger pain - more than it has been in my life before\" - currently 0/10 - today had an episode of pain that results in diarrhea/dry heaves  2. Diarrhea - watery stools - denies black, tarry, bloody stool x 3-5  3. Dry heaves x 3 - had not eaten anything  4. Weakness - able to stand, walk to bathroom, no blurry vision or feeling like he may faint  5. Possible some chills - has not checked   6. Have been eating at home recently - him and wife have eaten the same things - she is experiencing no symptoms  7. States adequate fluid intake the last 24 hours\"      This morning while sitting at the coffee shop he began having symptoms. He had pain in his lower abdomen. He had not had anything to eat yet and thought it was " possibly hunger pains. Patient went to the bathroom; stool was hard and quickly turned to diarrhea. He stood up and then vomited four times in a row. He sat on a chair next to the bathroom, made it back to his table, and then had two more liquid bowel movements before 10 am. He picked up his wife, went home, and has had two more liquid bowel movements since. Denies cramping. He has noticeable localized pain centered below his naval. Having gas runs in his family. He has lived with this is whole life but without pain. Two months ago he began having pain in the area with gas build up. Two weeks ago he was at [facility] and had persistent pain despite releasing gas. His pain two months ago was a 7/10 and that was the worst his pain has been. He has not vomited since this morning and is not currently in pain. Pain subsided once he started having multiple bowel movements. He has not felt feverish; temperature was 97.7 this morning while he was laying in bed. Denies pain with urination or blood in the urine.    A year and a half ago in March he had a rectal bleed in [place]. He was on aspirin. He went to five different doctors with significant rectal bleeding and was sent to a local hospital. The bleeding stopped after 8 bloody discharges. Patient had cardiac workup at the time and now has well established healthcare in [place] as they live there in the winter. He had an upper endoscopy and colonoscopy at the time and findings were normal. The bleed healed up and they did not see it.    No exposure to illness. His wife does not have any similar symptoms. They ate the same foods yesterday. Patient still has his appendix.      Problem list and histories reviewed & adjusted, as indicated.  Additional history: as documented  Patient Active Problem List   Diagnosis     Hyperlipidemia with target LDL less than 130     Hypertension goal BP (blood pressure) < 140/90     Torsion, testicular     Peripheral neuropathy     Toe ulcer (H)      Impaired fasting glucose     Obesity     Advanced directives, counseling/discussion     Foot osteomyelitis, right (H)     Health Care Home     Past Surgical History:   Procedure Laterality Date     AMPUTATE FOOT Right 9/22/2016    Procedure: AMPUTATE FOOT;  Surgeon: Melchor Auguste DPM;  Location: [facility] OR     AMPUTATE FOOT Right 9/24/2016    Procedure: AMPUTATE FOOT;  Surgeon: Venkat Ramos DPM;  Location: [facility] OR     AMPUTATE TOE(S)  5/13/2014    Procedure: AMPUTATE TOE(S);  Surgeon: Venkat Ramos DPM;  [facility]     C NONSPECIFIC PROCEDURE      tonsillectomy     C NONSPECIFIC PROCEDURE      hernia surgery     C NONSPECIFIC PROCEDURE  5/04    reduction torsion left testis     [facility] COLONOSCOPY THRU STOMA, DIAGNOSTIC  3/03    diverticulae,some inflamed,hemorrhoids     HEMORRHOIDECTOMY  4/07    single,anterior     IRRIGATION AND DEBRIDEMENT FOOT, COMBINED Right 9/22/2016    Procedure: COMBINED IRRIGATION AND DEBRIDEMENT FOOT;  Surgeon: Melchor Auguste DPM;  Location:  OR     REPAIR HAMMER TOE  5/13/2014    Procedure: REPAIR HAMMER TOE;  Surgeon: Venkat Ramos DPM;  Location[facility]       Social History   Substance Use Topics     Smoking status: Former Smoker     Types: Cigars     Quit date: 1/1/2004     Smokeless tobacco: Never Used     Alcohol use Yes      Comment: rarely      Family History   Problem Relation Age of Onset     CANCER Mother      Would not permit any exams or investigation,so dx inferred by family     C.A.D. Father      50's     Respiratory Brother      COPD, smoker     Family History Negative Brother          Current Outpatient Prescriptions   Medication Sig Dispense Refill     lisinopril (PRINIVIL,ZESTRIL) 10 MG tablet Take 1 tablet (10 mg) by mouth daily 90 tablet 3     simvastatin (ZOCOR) 20 MG tablet Take 1 tablet (20 mg) by mouth At Bedtime 90 tablet 3     Allergies   Allergen Reactions     No Known Drug Allergies      Recent Labs   Lab Test  11/16/16   7981   09/27/16   0652  09/26/16   1453  09/26/16   0625   11/04/15   1213  11/19/14   0939   11/27/13   0902   A1C  5.8   --    --   6.4*   --   6.0   --    < >   --    LDL  87   --    --    --    --   107  71   < >  145*   HDL  32*   --    --    --    --   34*  41   < >  32*   TRIG  287*   --    --    --    --   394*  257*   < >  284*   ALT   --    --    --    --    --   34  28   --   46   CR   --   0.98   --   1.07   < >  0.68  0.66   < >  0.73   GFRESTIMATED   --   76   --   69   < >  >90  Non  GFR Calc    >90  Non  GFR Calc     < >  >90   GFRESTBLACK   --   >90   GFR Calc     --   83   < >  >90   GFR Calc    >90   GFR Calc     < >  >90   POTASSIUM   --    --   3.4  3.2*   < >  3.8  3.3*   < >  3.5    < > = values in this interval not displayed.      BP Readings from Last 3 Encounters:   10/24/17 (!) 131/94   10/05/17 116/66   06/28/17 128/88    Wt Readings from Last 3 Encounters:   10/24/17 (!) 137 kg (302 lb)   10/05/17 (!) 138.8 kg (306 lb)   06/28/17 135.5 kg (298 lb 12 oz)        Reviewed and updated as needed this visit by clinical staffTobacco  Allergies  Meds  Med Hx  Surg Hx  Fam Hx  Soc Hx      Reviewed and updated as needed this visit by Provider         ROS:  See above.    This document serves as a record of the services and decisions personally performed and made by Marichuy Zhu MD. It was created on his/her behalf by Mary Crowder, trained medical scribe. The creation of this document is based the provider's statements to the medical scribes.    [employer], October 24, 2017  OBJECTIVE:     BP (!) 131/94  Pulse 78  Temp 98.5  F (36.9  C) (Oral)  Resp 14  Wt (!) 137 kg (302 lb)  SpO2 96%  BMI 35.35 kg/m2  Body mass index is 35.35 kg/(m^2).  GENERAL: healthy, alert and no distress  HENT: ear canals and TM's normal, nose and mouth without ulcers or lesions  NECK: no adenopathy, no asymmetry, masses, or  scars and thyroid normal to palpation  RESP: lungs clear to auscultation - no rales, rhonchi or wheezes  CV: regular rate and rhythm, normal S1 S2, no S3 or S4, no murmur, click or rub, no peripheral edema and peripheral pulses strong  ABDOMEN: soft, slight tenderness to palpation in the LUQ and RUQ, no hepatosplenomegaly, no masses and bowel sounds normal   PSYCH: mentation appears normal, affect normal/bright    Diagnostic Test Results:  No results found for this or any previous visit (from the past 24 hour(s)).     ASSESSMENT/PLAN:   1. Diarrhea, unspecified type  2. Abdominal pain, generalized  3. Flatulence, eructation and gas pain  I suspect today's episode was secondary to viral gastroenteritis. Symptoms have improved, no current abdominal pain and he looks fairly well.  Pt had a rectal bleed while in [place] with further workup a year and a half ago in March. Colonoscopy and upper endoscopy were reportedly normal and no recurrence after stopping ASA. Declines labs today and would like to monitor his sx. Discussed if severe abdominal pain returns today he should go to the ER. He agrees to call if new or worsening symptoms and will go to ER if severe.     4. Hypertension goal BP (blood pressure) < 140/90  Mild elevation. Continues on lisinopril 10 mg daily. He has follow up with PCP scheduled for CPE.     5. Hyperlipidemia with target LDL less than 130  Stable. Continues on simvastatin 20 mg daily.    6. Impaired fasting glucose    Lab Results   Component Value Date    A1C 5.8 11/16/2016    A1C 6.4 09/26/2016    A1C 6.0 11/04/2015    A1C 5.7 03/03/2014     7. Idiopathic peripheral neuropathy  Stable.      Patient Instructions   Continue to monitor your symptoms. If you notice any worsening or new symptoms, let us know.      The information in this document, created by the medical scribe for me, accurately reflects the services I personally performed and the decisions made by me. I have reviewed and approved this  document for accuracy. 10/24/17    Marichuy Zhu MD  [facility]

## 2017-11-15 ENCOUNTER — OFFICE VISIT (OUTPATIENT)
Dept: FAMILY MEDICINE | Facility: CLINIC | Age: 67
End: 2017-11-15
Payer: COMMERCIAL

## 2017-11-15 VITALS
SYSTOLIC BLOOD PRESSURE: 132 MMHG | DIASTOLIC BLOOD PRESSURE: 88 MMHG | HEART RATE: 62 BPM | TEMPERATURE: 98 F | HEIGHT: 77 IN | BODY MASS INDEX: 35.78 KG/M2 | RESPIRATION RATE: 20 BRPM | WEIGHT: 303 LBS | OXYGEN SATURATION: 95 %

## 2017-11-15 DIAGNOSIS — R73.01 IMPAIRED FASTING GLUCOSE: ICD-10-CM

## 2017-11-15 DIAGNOSIS — E78.5 HYPERLIPIDEMIA WITH TARGET LDL LESS THAN 130: ICD-10-CM

## 2017-11-15 DIAGNOSIS — Z12.5 SPECIAL SCREENING FOR MALIGNANT NEOPLASM OF PROSTATE: ICD-10-CM

## 2017-11-15 DIAGNOSIS — Z00.00 ROUTINE GENERAL MEDICAL EXAMINATION AT A HEALTH CARE FACILITY: Primary | ICD-10-CM

## 2017-11-15 DIAGNOSIS — I10 HYPERTENSION GOAL BP (BLOOD PRESSURE) < 140/90: ICD-10-CM

## 2017-11-15 DIAGNOSIS — G60.9 IDIOPATHIC PERIPHERAL NEUROPATHY: ICD-10-CM

## 2017-11-15 LAB
ALBUMIN SERPL-MCNC: 3.7 G/DL (ref 3.4–5)
ALP SERPL-CCNC: 76 U/L (ref 40–150)
ALT SERPL W P-5'-P-CCNC: 45 U/L (ref 0–70)
ANION GAP SERPL CALCULATED.3IONS-SCNC: 9 MMOL/L (ref 3–14)
AST SERPL W P-5'-P-CCNC: 37 U/L (ref 0–45)
BILIRUB SERPL-MCNC: 0.6 MG/DL (ref 0.2–1.3)
BUN SERPL-MCNC: 16 MG/DL (ref 7–30)
CALCIUM SERPL-MCNC: 8.8 MG/DL (ref 8.5–10.1)
CHLORIDE SERPL-SCNC: 103 MMOL/L (ref 94–109)
CHOLEST SERPL-MCNC: 193 MG/DL
CO2 SERPL-SCNC: 27 MMOL/L (ref 20–32)
CREAT SERPL-MCNC: 0.76 MG/DL (ref 0.66–1.25)
GFR SERPL CREATININE-BSD FRML MDRD: >90 ML/MIN/1.7M2
GLUCOSE SERPL-MCNC: 125 MG/DL (ref 70–99)
HBA1C MFR BLD: 6 % (ref 4.3–6)
HDLC SERPL-MCNC: 43 MG/DL
LDLC SERPL CALC-MCNC: 86 MG/DL
NONHDLC SERPL-MCNC: 150 MG/DL
POTASSIUM SERPL-SCNC: 3.5 MMOL/L (ref 3.4–5.3)
PROT SERPL-MCNC: 7.8 G/DL (ref 6.8–8.8)
PSA SERPL-ACNC: 0.88 UG/L (ref 0–4)
SODIUM SERPL-SCNC: 139 MMOL/L (ref 133–144)
TRIGL SERPL-MCNC: 321 MG/DL

## 2017-11-15 PROCEDURE — 99213 OFFICE O/P EST LOW 20 MIN: CPT | Mod: 25 | Performed by: FAMILY MEDICINE

## 2017-11-15 PROCEDURE — 80053 COMPREHEN METABOLIC PANEL: CPT | Performed by: FAMILY MEDICINE

## 2017-11-15 PROCEDURE — 83036 HEMOGLOBIN GLYCOSYLATED A1C: CPT | Performed by: FAMILY MEDICINE

## 2017-11-15 PROCEDURE — 80061 LIPID PANEL: CPT | Performed by: FAMILY MEDICINE

## 2017-11-15 PROCEDURE — 36415 COLL VENOUS BLD VENIPUNCTURE: CPT | Performed by: FAMILY MEDICINE

## 2017-11-15 PROCEDURE — 99397 PER PM REEVAL EST PAT 65+ YR: CPT | Performed by: FAMILY MEDICINE

## 2017-11-15 PROCEDURE — G0103 PSA SCREENING: HCPCS | Performed by: FAMILY MEDICINE

## 2017-11-15 RX ORDER — LISINOPRIL 20 MG/1
20 TABLET ORAL DAILY
Qty: 90 TABLET | Refills: 3 | Status: SHIPPED | OUTPATIENT
Start: 2017-11-15 | End: 2019-04-09

## 2017-11-15 RX ORDER — SIMVASTATIN 20 MG
20 TABLET ORAL AT BEDTIME
Qty: 90 TABLET | Refills: 3 | Status: SHIPPED | OUTPATIENT
Start: 2017-11-15 | End: 2021-01-01

## 2017-11-15 RX ORDER — LISINOPRIL 10 MG/1
10 TABLET ORAL DAILY
Qty: 90 TABLET | Refills: 3 | Status: SHIPPED | OUTPATIENT
Start: 2017-11-15 | End: 2017-11-15

## 2017-11-15 NOTE — NURSING NOTE
"Chief Complaint   Patient presents with     Physical       Initial BP (!) 132/92  Pulse 62  Temp 98  F (36.7  C) (Oral)  Resp 20  Ht 6' 5\" (1.956 m)  Wt (!) 303 lb (137.4 kg)  SpO2 95%  BMI 35.93 kg/m2 Estimated body mass index is 35.93 kg/(m^2) as calculated from the following:    Height as of this encounter: 6' 5\" (1.956 m).    Weight as of this encounter: 303 lb (137.4 kg).  Medication Reconciliation: complete   Bethany Ballard MA      "

## 2017-11-15 NOTE — PROGRESS NOTES
SUBJECTIVE:   Mauricio Barrera is a 66 year old male who presents for Preventive Visit.    Are you in the first 12 months of your Medicare coverage?  No    Physical   Annual:     Getting at least 3 servings of Calcium per day::  Yes    Bi-annual eye exam::  Yes    Dental care twice a year::  NO    Sleep apnea or symptoms of sleep apnea::  None    Diet::  Regular (no restrictions)    Frequency of exercise::  2-3 days/week    Duration of exercise::  45-60 minutes    Taking medications regularly::  Yes    Medication side effects::  None    Additional concerns today::  YES      COGNITIVE SCREEN  1) Repeat 3 items (Banana, Sunrise, Chair)    2) Clock draw: NORMAL  3) 3 item recall: Recalls 3 objects  Results: 3 items recalled: COGNITIVE IMPAIRMENT LESS LIKELY    Mini-CogTM Copyright S Elpidio. Licensed by the author for use in [facility]; reprinted with permission (london@[facility]). All rights reserved.      Reviewed and updated as needed this visit by clinical staffTobacco  Meds  Med Hx  Surg Hx  Fam Hx  Soc Hx        Reviewed and updated as needed this visit by Provider        Social History   Substance Use Topics     Smoking status: Former Smoker     Types: Cigars     Quit date: 1/1/2004     Smokeless tobacco: Never Used     Alcohol use Yes      Comment: rarely      The patient does not drink >3 drinks per day nor >7 drinks per week.      Today's PHQ-2 Score:   PHQ-2 ( 1999 [employer]) 11/13/2017   Q1: Little interest or pleasure in doing things 0   Q2: Feeling down, depressed or hopeless 0   PHQ-2 Score 0   Q1: Little interest or pleasure in doing things Not at all   Q2: Feeling down, depressed or hopeless Not at all   PHQ-2 Score 0     Do you feel safe in your environment - Yes    Do you have a Health Care Directive?: Yes: Patient states has Advance Directive and will bring in a copy to clinic.    Current providers sharing in care for this patient include:   Patient Care Team:  Leon Hinojosa MD  as PCP - General (Family Practice)  Magdalena Chatterjee, RN as Clinic Care Coordinator (Nurse)  Leon Hinojosa MD as Referring Physician (Family Practice)  Vaughn Garland MD as MD (Urology)  Dorothy Boo, RN as Registered Nurse (Urology)  Teddy Germain MD as MD (Ophthalmology)      Hearing impairment: Yes, minor    Ability to successfully perform activities of daily living: Yes, no assistance needed     Fall risk:    Home safety:  none identified    The following health maintenance items are reviewed in Epic and correct as of today:  Health Maintenance   Topic Date Due     A1C Q6 MO  05/16/2017     LIPID MONITORING Q6 MO  05/16/2017     FALL RISK ASSESSMENT  11/16/2017     ADVANCE DIRECTIVE PLANNING Q5 YRS  01/19/2020     COLON CANCER SCREEN (SYSTEM ASSIGNED)  04/11/2024     TETANUS IMMUNIZATION (SYSTEM ASSIGNED)  11/16/2026     INFLUENZA VACCINE (SYSTEM ASSIGNED)  Addressed     PNEUMOCOCCAL  Completed     AORTIC ANEURYSM SCREENING (SYSTEM ASSIGNED)  Completed     HEPATITIS C SCREENING  Completed     Had GI distress and it improved on its own.     Due to feet neuropathy harder to walk. Wondering about handicap sticker.   Knee joint pain due to that. Had feet surgery. Balance issue after toe removal. Many near fall episodes due to that.   History of rectal bleed in [place] and not using aspirin.   High blood pressure - under good control as per patient   Glucose - was on metformin when he had foot ulcer but no diagnosis of diabetes. Had metformin for better healing of his ulcer.     Review of Systems - see above.   C: NEGATIVE for fever, chills, change in weight  I: NEGATIVE for worrisome rashes, moles or lesions  E: NEGATIVE for vision changes or irritation  E/M: NEGATIVE for ear, mouth and throat problems  R: NEGATIVE for significant cough or SOB  B: NEGATIVE for masses, tenderness or discharge  CV: NEGATIVE for chest pain, palpitations or peripheral edema  GI: NEGATIVE for nausea,  "abdominal pain, heartburn, or change in bowel habits  : NEGATIVE for frequency, dysuria, or hematuria  M: NEGATIVE for significant arthralgias or myalgia  N: NEGATIVE for weakness, dizziness or paresthesias  E: NEGATIVE for temperature intolerance, skin/hair changes  H: NEGATIVE for bleeding problems  P: NEGATIVE for changes in mood or affect    OBJECTIVE:   /88  Pulse 62  Temp 98  F (36.7  C) (Oral)  Resp 20  Ht 6' 5\" (1.956 m)  Wt (!) 303 lb (137.4 kg)  SpO2 95%  BMI 35.93 kg/m2 Estimated body mass index is 35.93 kg/(m^2) as calculated from the following:    Height as of this encounter: 6' 5\" (1.956 m).    Weight as of this encounter: 303 lb (137.4 kg).  Physical Exam  GENERAL: healthy, alert and no distress  EYES: Eyes grossly normal to inspection, PERRL and conjunctivae and sclerae normal  HENT: ear canals and TM's normal, nose and mouth without ulcers or lesions  NECK: no adenopathy, no asymmetry, masses, or scars and thyroid normal to palpation  RESP: lungs clear to auscultation - no rales, rhonchi or wheezes  CV: regular rate and rhythm, normal S1 S2, no S3 or S4, no murmur, click or rub, no peripheral edema and peripheral pulses strong  ABDOMEN: soft, nontender, no hepatosplenomegaly, no masses and bowel sounds normal  MS: no gross musculoskeletal defects noted, no edema  SKIN: no suspicious lesions or rashes  NEURO: Normal strength and tone, mentation intact and speech normal  PSYCH: mentation appears normal, affect normal/bright    ASSESSMENT / PLAN:   1. Routine general medical examination at a health care facility       2. Idiopathic peripheral neuropathy  Able to walk around 200 feet but not without stopping. Toe amputation and also his gait is not steady as per patient. OK to fill out handicap form. Original given back to patient. Copy for abstraction.     3. Hypertension goal BP (blood pressure) < 140/90  bp just below goal. Has been running high. Go up on lisinopril before his " "international travel. He agreed.   - lisinopril (PRINIVIL/ZESTRIL) 20 MG tablet; Take 1 tablet (20 mg) by mouth daily  Dispense: 90 tablet; Refill: 3  - Comprehensive metabolic panel    4. Hyperlipidemia with target LDL less than 130   Patient is tolerating current medication without any major side effects of concerns and current dose seems reasonable too.  Current medication regime is effective. Continue current treatment without any changes.   - simvastatin (ZOCOR) 20 MG tablet; Take 1 tablet (20 mg) by mouth At Bedtime  Dispense: 90 tablet; Refill: 3  - Comprehensive metabolic panel  - Lipid panel reflex to direct LDL Fasting    5. Impaired fasting glucose   monitor A1c. Prediabetic. Morbidly obese.   - Hemoglobin A1c    6. Special screening for malignant neoplasm of prostate     - PSA, screen     End of Life Planning:  Patient currently has an advanced directive: full code    COUNSELING:  Reviewed preventive health counseling, as reflected in patient instructions               Regular exercise       Healthy diet/nutrition       Vision screening       Hearing screening       Dental care       Colon cancer screening       Prostate cancer screening    Estimated body mass index is 35.93 kg/(m^2) as calculated from the following:    Height as of this encounter: 6' 5\" (1.956 m).    Weight as of this encounter: 303 lb (137.4 kg).  Weight management plan: Discussed healthy diet and exercise guidelines and patient will follow up in 12 months in clinic to re-evaluate.   reports that he quit smoking about 13 years ago. His smoking use included Cigars. He has never used smokeless tobacco.    Appropriate preventive services were discussed with this patient, including applicable screening as appropriate for cardiovascular disease, diabetes, osteopenia/osteoporosis, and glaucoma.  As appropriate for age/gender, discussed screening for colorectal cancer, prostate cancer, breast cancer, and cervical cancer. Checklist reviewing " preventive services available has been given to the patient.    Reviewed patients plan of care and provided an AVS. The Basic Care Plan (routine screening as documented in Health Maintenance) for Mauricio meets the Care Plan requirement. This Care Plan has been established and reviewed with the Patient.    Counseling Resources:  ATP IV Guidelines  Pooled Cohorts Equation Calculator  Breast Cancer Risk Calculator  FRAX Risk Assessment  ICSI Preventive Guidelines  Dietary Guidelines for Americans, 2010  USDA's MyPlate  ASA Prophylaxis  Lung CA Screening    Leon Hinojosa MD  [facility] [place] for HPI/ROS submitted by the patient on 11/13/2017   PHQ-2 Score: 0

## 2017-11-15 NOTE — MR AVS SNAPSHOT
After Visit Summary   11/15/2017    Mauricio Barrera    MRN: [id number]           Patient Information     Date Of Birth          1950        Visit Information        Provider Department      11/15/2017 9:00 AM Leon Hinojosa MD [facility]        Today's Diagnoses     Routine general medical examination at a health care facility    -  1    Hypertension goal BP (blood pressure) < 140/90        Hyperlipidemia with target LDL less than 130        Impaired fasting glucose          Care Instructions      Preventive Health Recommendations:       Male Ages 65 and over    Yearly exam:             See your health care provider every year in order to  o   Review health changes.   o   Discuss preventive care.    o   Review your medicines if your doctor has prescribed any.    Talk with your health care provider about whether you should have a test to screen for prostate cancer (PSA).    Every 3 years, have a diabetes test (fasting glucose). If you are at risk for diabetes, you should have this test more often.    Every 5 years, have a cholesterol test. Have this test more often if you are at risk for high cholesterol or heart disease.     Every 10 years, have a colonoscopy. Or, have a yearly FIT test (stool test). These exams will check for colon cancer.    Talk to with your health care provider about screening for Abdominal Aortic Aneurysm if you have a family history of AAA or have a history of smoking.  Shots:     Get a flu shot each year.     Get a tetanus shot every 10 years.     Talk to your doctor about your pneumonia vaccines. There are now two you should receive - Pneumovax (PPSV 23) and Prevnar (PCV 13).    Talk to your doctor about a shingles vaccine.     Talk to your doctor about the hepatitis B vaccine.  Nutrition:     Eat at least 5 servings of fruits and vegetables each day.     Eat whole-grain bread, whole-wheat pasta and brown rice instead of white grains and rice.      Talk to your doctor about Calcium and Vitamin D.   Lifestyle    Exercise for at least 150 minutes a week (30 minutes a day, 5 days a week). This will help you control your weight and prevent disease.     Limit alcohol to one drink per day.     No smoking.     Wear sunscreen to prevent skin cancer.     See your dentist every six months for an exam and cleaning.     See your eye doctor every 1 to 2 years to screen for conditions such as glaucoma, macular degeneration and cataracts.          Follow-ups after your visit        Your next 10 appointments already scheduled     Nov 16, 2017  2:15 PM CST   RETURN RETINA with Teddy Germain MD   Eye Clinic ([facility])    [address]  [address]  [address]  [facility] [address] [phone]              Who to contact     If you have questions or need follow up information about today's clinic visit or your schedule please contact [facility] directly at [phone].  Normal or non-critical lab and imaging results will be communicated to you by ChronoWakehart, letter or phone within 4 business days after the clinic has received the results. If you do not hear from us within 7 days, please contact the clinic through Touchbaset or phone. If you have a critical or abnormal lab result, we will notify you by phone as soon as possible.  Submit refill requests through BeauCoo or call your pharmacy and they will forward the refill request to us. Please allow 3 business days for your refill to be completed.          Additional Information About Your Visit        BeauCoo Information     BeauCoo gives you secure access to your electronic health record. If you see a primary care provider, you can also send messages to your care team and make appointments. If you have questions, please call your primary care clinic.  If you do not have a primary care provider, please call [phone] and they will assist you.        Care  "EveryWhere ID     This is your Care EveryWhere ID. This could be used by other organizations to access your [place] medical records  [id number]        Your Vitals Were     Pulse Temperature Respirations Height Pulse Oximetry BMI (Body Mass Index)    62 98  F (36.7  C) (Oral) 20 6' 5\" (1.956 m) 95% 35.93 kg/m2       Blood Pressure from Last 3 Encounters:   11/15/17 132/88   10/24/17 (!) 131/94   10/05/17 116/66    Weight from Last 3 Encounters:   11/15/17 (!) 303 lb (137.4 kg)   10/24/17 (!) 302 lb (137 kg)   10/05/17 (!) 306 lb (138.8 kg)              We Performed the Following     Comprehensive metabolic panel     Hemoglobin A1c     Lipid panel reflex to direct LDL Fasting          Today's Medication Changes          These changes are accurate as of: 11/15/17  9:37 AM.  If you have any questions, ask your nurse or doctor.               Start taking these medicines.        Dose/Directions    lisinopril 20 MG tablet   Commonly known as:  PRINIVIL/ZESTRIL   Used for:  Hypertension goal BP (blood pressure) < 140/90   Started by:  Leon Hinojosa MD        Dose:  20 mg   Take 1 tablet (20 mg) by mouth daily   Quantity:  90 tablet   Refills:  3            Where to get your medicines      These medications were sent to [facility] [address] [place] [address]  [address] [address]     Phone:  [phone]     lisinopril 20 MG tablet    simvastatin 20 MG tablet                Primary Care Provider Office Phone # Fax #    Leon Hinojosa [phone] [address] [place] [address]        Equal Access to Services     XUAN ALLEN AH: Hadtory Lobo, abigail rothman, qaybta kaalmada nancy graves. So [facility] [phone].    ATENCIÓN: Si habla español, tiene a crawford disposición servicios gratuitos de asistencia lingüística. Llame al [phone].    We comply with applicable " federal civil rights laws and [place] laws. We do not discriminate on the basis of race, color, national origin, age, disability, sex, sexual orientation, or gender identity.            Thank you!     Thank you for choosing [facility]  for your care. Our goal is always to provide you with excellent care. Hearing back from our patients is one way we can continue to improve our services. Please take a few minutes to complete the written survey that you may receive in the mail after your visit with us. Thank you!             Your Updated Medication List - Protect others around you: Learn how to safely use, store and throw away your medicines at www.disposemymeds.org.          This list is accurate as of: 11/15/17  9:37 AM.  Always use your most recent med list.                   Brand Name Dispense Instructions for use Diagnosis    lisinopril 20 MG tablet    PRINIVIL/ZESTRIL    90 tablet    Take 1 tablet (20 mg) by mouth daily    Hypertension goal BP (blood pressure) < 140/90       simvastatin 20 MG tablet    ZOCOR    90 tablet    Take 1 tablet (20 mg) by mouth At Bedtime    Hyperlipidemia with target LDL less than 130

## 2017-11-16 ENCOUNTER — OFFICE VISIT (OUTPATIENT)
Dept: OPHTHALMOLOGY | Facility: CLINIC | Age: 67
End: 2017-11-16
Attending: OPHTHALMOLOGY
Payer: COMMERCIAL

## 2017-11-16 DIAGNOSIS — H43.811 PVD (POSTERIOR VITREOUS DETACHMENT), RIGHT: Primary | ICD-10-CM

## 2017-11-16 DIAGNOSIS — H25.13 SENILE NUCLEAR SCLEROSIS, BILATERAL: ICD-10-CM

## 2017-11-16 PROCEDURE — 99214 OFFICE O/P EST MOD 30 MIN: CPT | Mod: 25,ZF | Performed by: TECHNICIAN/TECHNOLOGIST

## 2017-11-16 PROCEDURE — 92015 DETERMINE REFRACTIVE STATE: CPT | Mod: ZF | Performed by: TECHNICIAN/TECHNOLOGIST

## 2017-11-16 ASSESSMENT — REFRACTION_WEARINGRX
OD_CYLINDER: +0.75
OS_AXIS: 115
OD_SPHERE: +1.00
OS_ADD: +2.50
OS_SPHERE: PLANO
OD_ADD: +2.50
SPECS_TYPE: BIFOCAL
OD_AXIS: 051
OS_CYLINDER: +0.25

## 2017-11-16 ASSESSMENT — REFRACTION_MANIFEST
OD_CYLINDER: +1.00
OS_SPHERE: -1.00
OD_AXIS: 050
OD_SPHERE: +0.75
OS_ADD: +2.50
OD_ADD: +2.50
OS_CYLINDER: SPHERE

## 2017-11-16 ASSESSMENT — VISUAL ACUITY
CORRECTION_TYPE: GLASSES
METHOD: SNELLEN - LINEAR
OD_CC: 20/20
OS_CC: 20/40

## 2017-11-16 ASSESSMENT — CONF VISUAL FIELD
OS_NORMAL: 1
OD_NORMAL: 1
METHOD: COUNTING FINGERS

## 2017-11-16 ASSESSMENT — EXTERNAL EXAM - RIGHT EYE: OD_EXAM: NORMAL

## 2017-11-16 ASSESSMENT — SLIT LAMP EXAM - LIDS
COMMENTS: NORMAL
COMMENTS: NORMAL

## 2017-11-16 ASSESSMENT — TONOMETRY
IOP_METHOD: ICARE
OD_IOP_MMHG: 12
OS_IOP_MMHG: 11

## 2017-11-16 ASSESSMENT — CUP TO DISC RATIO
OS_RATIO: 0.2
OD_RATIO: 0.2

## 2017-11-16 ASSESSMENT — EXTERNAL EXAM - LEFT EYE: OS_EXAM: NORMAL

## 2017-11-16 NOTE — NURSING NOTE
Chief Complaints and History of Present Illnesses   Patient presents with     Follow Up For     PVD, right eye     HPI    Symptoms:              Comments:  4 weeks follow up for PVD, right eye.   Patient denies any new floaters. May have had one episode of flash but uncertain per patient.   LISA Stewart 11/16/2017 2:24 PM

## 2017-11-16 NOTE — MR AVS SNAPSHOT
After Visit Summary   11/16/2017    Mauricio Barrera    MRN: [id number]           Patient Information     Date Of Birth          1950        Visit Information        Provider Department      11/16/2017 2:15 PM Teddy Germain MD [facility]        Today's Diagnoses     PVD (posterior vitreous detachment), right    -  1    Senile nuclear sclerosis, bilateral           Follow-ups after your visit        Follow-up notes from your care team     Return in about 6 months (around 5/16/2018) for PVD.      Who to contact     Please call your clinic at [phone] to:    Ask questions about your health    Make or cancel appointments    Discuss your medicines    Learn about your test results    Speak to your doctor   If you have compliments or concerns about an experience at your clinic, or if you wish to file a complaint, please contact [facility] Patient Relations at [phone] or email us at [email or web address]         Additional Information About Your Visit        MyChart Information     WhenSoont gives you secure access to your electronic health record. If you see a primary care provider, you can also send messages to your care team and make appointments. If you have questions, please call your primary care clinic.  If you do not have a primary care provider, please call [phone] and they will assist you.      Realtime Games is an electronic gateway that provides easy, online access to your medical records. With Realtime Games, you can request a clinic appointment, read your test results, renew a prescription or communicate with your care team.     To access your existing account, please contact your [facility] or call [phone] for assistance.        Care EveryWhere ID     This is your Care EveryWhere ID. This could be used by other organizations to access your [place] medical records  [id number]         Blood Pressure  from Last 3 Encounters:   11/15/17 132/88   10/24/17 (!) 131/94   10/05/17 116/66    Weight from Last 3 Encounters:   11/15/17 (!) 137.4 kg (303 lb)   10/24/17 (!) 137 kg (302 lb)   10/05/17 (!) 138.8 kg (306 lb)              Today, you had the following     No orders found for display       Primary Care Provider Office Phone # Fax #    Leon Pierce Hinojosa [phone] [address] [place], [place] [id number]        Equal Access to Services     [facility]: Hadii melvin Lobo, waolga rothman, radha kaalmajessica graves, nancy abbott . So [facility] [phone].    ATENCIÓN: Si habla español, tiene a crawford disposición servicios gratuitos de asistencia lingüística. Ll[facility] [phone].    We comply with applicable federal civil rights laws and [place] laws. We do not discriminate on the basis of race, color, national origin, age, disability, sex, sexual orientation, or gender identity.            Thank you!     Thank you for choosing EYE CLINIC  for your care. Our goal is always to provide you with excellent care. Hearing back from our patients is one way we can continue to improve our services. Please take a few minutes to complete the written survey that you may receive in the mail after your visit with us. Thank you!             Your Updated Medication List - Protect others around you: Learn how to safely use, store and throw away your medicines at www.disposemymeds.org.          This list is accurate as of: 11/16/17  2:54 PM.  Always use your most recent med list.                   Brand Name Dispense Instructions for use Diagnosis    lisinopril 20 MG tablet    PRINIVIL/ZESTRIL    90 tablet    Take 1 tablet (20 mg) by mouth daily    Hypertension goal BP (blood pressure) < 140/90       simvastatin 20 MG tablet    ZOCOR    90 tablet    Take 1 tablet (20 mg) by mouth At Bedtime    Hyperlipidemia with target LDL less than 130

## 2017-11-16 NOTE — PROGRESS NOTES
I have confirmed the patient's and reviewed Past Medical History, Past Surgical History, Social History, Family History, Problem List, Medication List and agree with Tech note.    CC: floaters right eye     HPI: new onset since last month, now much improved     Assessment/plan:   1.  Posterior vitreous detachment (PVD) right eye    - Informed patient    - Retinal detachment/retinal tear precautions discussed with patient  Contact clinic immediately for new floaters/flashers or shadows in vision     2.  Nuclear sclerotic cataract  Both eyes    - Not visually significant   - refract as needed    - new prescription glasses issued    - monitor yearly     RTC 6 months     Teddy Gifford MD PhD.  Professor & Chair

## 2018-03-28 ENCOUNTER — ALLIED HEALTH/NURSE VISIT (OUTPATIENT)
Dept: NURSING | Facility: CLINIC | Age: 68
End: 2018-03-28
Payer: COMMERCIAL

## 2018-03-28 VITALS — WEIGHT: 304 LBS | BODY MASS INDEX: 36.05 KG/M2

## 2018-03-28 DIAGNOSIS — E66.9 OBESITY: Primary | ICD-10-CM

## 2018-04-06 ENCOUNTER — MYC MEDICAL ADVICE (OUTPATIENT)
Dept: PODIATRY | Facility: CLINIC | Age: 68
End: 2018-04-06

## 2018-04-16 ENCOUNTER — OFFICE VISIT (OUTPATIENT)
Dept: FAMILY MEDICINE | Facility: CLINIC | Age: 68
End: 2018-04-16
Payer: COMMERCIAL

## 2018-04-16 VITALS
DIASTOLIC BLOOD PRESSURE: 87 MMHG | BODY MASS INDEX: 35.95 KG/M2 | TEMPERATURE: 98.2 F | RESPIRATION RATE: 12 BRPM | HEIGHT: 77 IN | SYSTOLIC BLOOD PRESSURE: 120 MMHG | WEIGHT: 304.5 LBS | HEART RATE: 80 BPM | OXYGEN SATURATION: 96 %

## 2018-04-16 DIAGNOSIS — E78.5 HYPERLIPIDEMIA WITH TARGET LDL LESS THAN 130: ICD-10-CM

## 2018-04-16 DIAGNOSIS — G60.9 IDIOPATHIC PERIPHERAL NEUROPATHY: ICD-10-CM

## 2018-04-16 DIAGNOSIS — R22.43 LOCALIZED SWELLING OF BOTH LOWER LEGS: Primary | ICD-10-CM

## 2018-04-16 DIAGNOSIS — I10 HYPERTENSION GOAL BP (BLOOD PRESSURE) < 140/90: ICD-10-CM

## 2018-04-16 LAB
ERYTHROCYTE [DISTWIDTH] IN BLOOD BY AUTOMATED COUNT: 13.2 % (ref 10–15)
HBA1C MFR BLD: 6 % (ref 0–5.6)
HCT VFR BLD AUTO: 45.5 % (ref 40–53)
HGB BLD-MCNC: 15.6 G/DL (ref 13.3–17.7)
MCH RBC QN AUTO: 30.6 PG (ref 26.5–33)
MCHC RBC AUTO-ENTMCNC: 34.3 G/DL (ref 31.5–36.5)
MCV RBC AUTO: 89 FL (ref 78–100)
NT-PROBNP SERPL-MCNC: 73 PG/ML (ref 0–125)
PLATELET # BLD AUTO: 285 10E9/L (ref 150–450)
RBC # BLD AUTO: 5.09 10E12/L (ref 4.4–5.9)
WBC # BLD AUTO: 9.4 10E9/L (ref 4–11)

## 2018-04-16 PROCEDURE — 85027 COMPLETE CBC AUTOMATED: CPT | Performed by: FAMILY MEDICINE

## 2018-04-16 PROCEDURE — 83036 HEMOGLOBIN GLYCOSYLATED A1C: CPT | Performed by: FAMILY MEDICINE

## 2018-04-16 PROCEDURE — 80053 COMPREHEN METABOLIC PANEL: CPT | Performed by: FAMILY MEDICINE

## 2018-04-16 PROCEDURE — 83880 ASSAY OF NATRIURETIC PEPTIDE: CPT | Performed by: FAMILY MEDICINE

## 2018-04-16 PROCEDURE — 99214 OFFICE O/P EST MOD 30 MIN: CPT | Performed by: FAMILY MEDICINE

## 2018-04-16 PROCEDURE — 80061 LIPID PANEL: CPT | Performed by: FAMILY MEDICINE

## 2018-04-16 PROCEDURE — 36415 COLL VENOUS BLD VENIPUNCTURE: CPT | Performed by: FAMILY MEDICINE

## 2018-04-16 NOTE — PROGRESS NOTES
SUBJECTIVE:   Mauricio Barrera is a 67 year old male who presents to clinic today for the following health issues:      Swollen Feet       Duration: In [place], since November    Description (location/character/radiation): bilateral feet, but more noticeable right foot but since coming back to [place], swelling has gone down, not walking as much as in [place]     Intensity:  mild    Accompanying signs and symptoms: loss of energy, feet swelling radiating into lower calf, no pain, redness    History (similar episodes/previous evaluation): None    Precipitating or alleviating factors: None    Therapies tried and outcome: elevating legs and compression socks in [place], not in [place].      In [place] - both feet began to swell. R>L.     Uses compression socks.     Feeling tired.    Not walking as much here but walking in [place].     2 yrs ago had cardiac workup in [facility] and it was negative.     Feet - has neuropathy.   No short of breath.     Next year he will be moving to [facility] permanently and will be visiting [place] once per year during May month.      Problem list and histories reviewed & adjusted, as indicated.  Additional history: as documented    Labs reviewed in EPIC    Reviewed and updated as needed this visit by clinical staff    Reviewed and updated as needed this visit by Provider      Social History     Social History     Marital status:      Spouse name: Cathleen     Number of children: 1     Years of education: N/A     Occupational History     Not on file.     Social History Main Topics     Smoking status: Former Smoker     Types: Cigars     Quit date: 1/1/2004     Smokeless tobacco: Never Used     Alcohol use Yes      Comment: rarely      Drug use: No     Sexual activity: Yes     Partners: Female     Other Topics Concern      Service No     Blood Transfusions No     Caffeine Concern No     Occupational Exposure No     Hobby Hazards No     Sleep Concern No      irregular sleep      Stress Concern No     Weight  "Concern Yes     Exercise Yes     10 times monthly     Seat Belt Yes     Self-Exams No     Parent/Sibling W/ Cabg, Mi Or Angioplasty Before 65f 55m? No     Social History Narrative    Balanced Diet - Yes    Osteoporosis Preventative measures-  Dairy servings per day: 2    Regular Exercise -  yes Describe walking 4 times per week    Dental Exam up - YES - Date: 3/07    Eye Exam -5/07    Self Testicular Exam -  No    Do you have any concerns about STD's -  No    Abuse: Current or Past (Physical, Sexual or Emotional)- No    Do you feel safe in your environment - Yes    Guns stored in the home - No    Sunscreen used - yes    Seatbelts used - Yes    Lipids - 04/07    Glucose -  4/07    Colon Cancer Screening - Colonoscopy 03-(date completed)    Hemoccults - 02-192003    PSA - 2005    Digital Rectal Exam - 02-    Immunizations reviewed and up to date - yes,utd    RYAN Murrieta     Allergies   Allergen Reactions     No Known Drug Allergies      Patient Active Problem List   Diagnosis     Hyperlipidemia with target LDL less than 130     Hypertension goal BP (blood pressure) < 140/90     Torsion, testicular     Peripheral neuropathy     Toe ulcer (H)     Impaired fasting glucose     Obesity     Advanced directives, counseling/discussion     Foot osteomyelitis, right (H)     Health Care Home     Reviewed medications, social history and  past medical and surgical history.    Review of system: for general, respiratory, CVS, GI and psychiatry negative except for noted above.     EXAM:  /87 (Cuff Size: Adult Large)  Pulse 80  Temp 98.2  F (36.8  C) (Oral)  Resp 12  Ht 6' 5\" (1.956 m)  Wt 304 lb 8 oz (138.1 kg)  SpO2 96%  BMI 36.11 kg/m2  Constitutional: healthy, alert and no distress   Psychiatric: mentation appears normal and affect normal/bright  JOINT/EXTREMITIES: Mild diffuse swelling of both lower extremity.  Right foot: 3rd toe surgically absent, deformity of 4th toe. Left foot - healed " surgical scar on 4th toe area.     ASSESSMENT / PLAN:  (R22.43) Localized swelling of both lower legs  (primary encounter diagnosis)  Comment:  unclear etiology.  Salt in diet, weight can contribute.  Is it is a new onset and it is affecting both lower extremities and it started before he flew, I think DVT is less likely.  Cardiac or renal etiologies cannot be ruled out completely.  We will obtain basic lab test and go from there.  May need to do further testing depending upon today's results.  I highly recommended him to continue with the compression socks and avoid added salt in diet.  Plan: Comprehensive metabolic panel, Hemoglobin A1c,         CBC with platelets, BNP-N terminal pro            (G60.9) Idiopathic peripheral neuropathy  Comment:   Plan: With a history of osteomyelitis and ulceration.  Was seen by podiatrist at that time.  If his symptoms are not improving it would be reasonable to see podiatry for follow-up.    (I10) Hypertension goal BP (blood pressure) < 140/90  Comment: See above.  Blood pressures in a pretty good shape right now.  Plan: BNP-N terminal pro         (E78.5) Hyperlipidemia with target LDL less than 130  Comment:  Due for his cholesterol recheck we will obtain that along with rest of the labs.  Plan: Lipid panel reflex to direct LDL Fasting             He is going to see me back in November for his routine physical if everything is improving.

## 2018-04-16 NOTE — MR AVS SNAPSHOT
After Visit Summary   4/16/2018    Mauricio Barrera    MRN: [id number]           Patient Information     Date Of Birth          1950        Visit Information        Provider Department      4/16/2018 11:40 AM Leon Hinojosa MD [facility]        Today's Diagnoses     Localized swelling of both lower legs    -  1    Idiopathic peripheral neuropathy        Hypertension goal BP (blood pressure) < 140/90        Hyperlipidemia with target LDL less than 130           Follow-ups after your visit        Your next 10 appointments already scheduled     May 16, 2018 10:30 AM CDT   RETURN RETINA with Teddy Germain MD   Eye Clinic ([facility])    [address]  [address]  [facility] [address] [phone]              Who to contact     If you have questions or need follow up information about today's clinic visit or your schedule please contact [facility] directly at [phone].  Normal or non-critical lab and imaging results will be communicated to you by Adient Healthhart, letter or phone within 4 business days after the clinic has received the results. If you do not hear from us within 7 days, please contact the clinic through Sag[employer] or phone. If you have a critical or abnormal lab result, we will notify you by phone as soon as possible.  Submit refill requests through [employer] or call your pharmacy and they will forward the refill request to us. Please allow 3 business days for your refill to be completed.          Additional Information About Your Visit        MyChart Information     [employer] gives you secure access to your electronic health record. If you see a primary care provider, you can also send messages to your care team and make appointments. If you have questions, please call your primary care clinic.  If you do not have a primary care provider, please call [phone] and they will assist  "you.        Care EveryWhere ID     This is your Care EveryWhere ID. This could be used by other organizations to access your [place] medical records  [id number]        Your Vitals Were     Pulse Temperature Respirations Height Pulse Oximetry BMI (Body Mass Index)    80 98.2  F (36.8  C) (Oral) 12 6' 5\" (1.956 m) 96% 36.11 kg/m2       Blood Pressure from Last 3 Encounters:   04/16/18 120/87   11/15/17 132/88   10/24/17 (!) 131/94    Weight from Last 3 Encounters:   04/16/18 304 lb 8 oz (138.1 kg)   03/28/18 304 lb (137.9 kg)   11/15/17 (!) 303 lb (137.4 kg)              We Performed the Following     BNP-N terminal pro     CBC with platelets     Comprehensive metabolic panel     Hemoglobin A1c     Lipid panel reflex to direct LDL Fasting        Primary Care Provider Office Phone # Fax #    Leon Pierce Hinojosa [phone] [address] [place], [place] [id number]        Equal Access to Services     [facility]: Hadii melvin ku hadasho Soomaali, waaxda luqadaha, qaybta kaalmada adeegyajessica, nancy abbott . So [facility] [phone].    ATENCIÓN: Si habla español, tiene a crawford disposición servicios gratuitos de asistencia lingüística. Jaroname al [phone].    We comply with applicable federal civil rights laws and [place] laws. We do not discriminate on the basis of race, color, national origin, age, disability, sex, sexual orientation, or gender identity.            Thank you!     Thank you for choosing [facility]  for your care. Our goal is always to provide you with excellent care. Hearing back from our patients is one way we can continue to improve our services. Please take a few minutes to complete the written survey that you may receive in the mail after your visit with us. Thank you!             Your Updated Medication List - Protect others around you: Learn how to safely use, store and throw away your medicines at www.disposemymeds.org.          This " list is accurate as of 4/16/18  1:40 PM.  Always use your most recent med list.                   Brand Name Dispense Instructions for use Diagnosis    lisinopril 20 MG tablet    PRINIVIL/ZESTRIL    90 tablet    Take 1 tablet (20 mg) by mouth daily    Hypertension goal BP (blood pressure) < 140/90       simvastatin 20 MG tablet    ZOCOR    90 tablet    Take 1 tablet (20 mg) by mouth At Bedtime    Hyperlipidemia with target LDL less than 130

## 2018-04-17 LAB
ALBUMIN SERPL-MCNC: 4.1 G/DL (ref 3.4–5)
ALP SERPL-CCNC: 72 U/L (ref 40–150)
ALT SERPL W P-5'-P-CCNC: 45 U/L (ref 0–70)
ANION GAP SERPL CALCULATED.3IONS-SCNC: 5 MMOL/L (ref 3–14)
AST SERPL W P-5'-P-CCNC: 49 U/L (ref 0–45)
BILIRUB SERPL-MCNC: 0.4 MG/DL (ref 0.2–1.3)
BUN SERPL-MCNC: 13 MG/DL (ref 7–30)
CALCIUM SERPL-MCNC: 9.1 MG/DL (ref 8.5–10.1)
CHLORIDE SERPL-SCNC: 106 MMOL/L (ref 94–109)
CHOLEST SERPL-MCNC: 201 MG/DL
CO2 SERPL-SCNC: 29 MMOL/L (ref 20–32)
CREAT SERPL-MCNC: 0.69 MG/DL (ref 0.66–1.25)
GFR SERPL CREATININE-BSD FRML MDRD: >90 ML/MIN/1.7M2
GLUCOSE SERPL-MCNC: 113 MG/DL (ref 70–99)
HDLC SERPL-MCNC: 38 MG/DL
LDLC SERPL CALC-MCNC: 100 MG/DL
NONHDLC SERPL-MCNC: 163 MG/DL
POTASSIUM SERPL-SCNC: 3.7 MMOL/L (ref 3.4–5.3)
PROT SERPL-MCNC: 8.3 G/DL (ref 6.8–8.8)
SODIUM SERPL-SCNC: 140 MMOL/L (ref 133–144)
TRIGL SERPL-MCNC: 315 MG/DL

## 2018-05-16 ENCOUNTER — OFFICE VISIT (OUTPATIENT)
Dept: OPHTHALMOLOGY | Facility: CLINIC | Age: 68
End: 2018-05-16
Attending: OPHTHALMOLOGY
Payer: COMMERCIAL

## 2018-05-16 DIAGNOSIS — H43.811 PVD (POSTERIOR VITREOUS DETACHMENT), RIGHT EYE: Primary | ICD-10-CM

## 2018-05-16 PROCEDURE — G0463 HOSPITAL OUTPT CLINIC VISIT: HCPCS | Mod: ZF

## 2018-05-16 ASSESSMENT — REFRACTION_WEARINGRX
OS_CYLINDER: SPHERE
OS_ADD: +2.50
OS_SPHERE: -1.00
OD_ADD: +2.50
OD_SPHERE: +0.75
SPECS_TYPE: BIFOCAL
OD_CYLINDER: +1.00
OD_AXIS: 046

## 2018-05-16 ASSESSMENT — TONOMETRY
OD_IOP_MMHG: 13
IOP_METHOD: TONOPEN
OS_IOP_MMHG: 15

## 2018-05-16 ASSESSMENT — CONF VISUAL FIELD
OD_NORMAL: 1
METHOD: COUNTING FINGERS
OS_NORMAL: 1

## 2018-05-16 ASSESSMENT — SLIT LAMP EXAM - LIDS
COMMENTS: NORMAL
COMMENTS: NORMAL

## 2018-05-16 ASSESSMENT — CUP TO DISC RATIO
OD_RATIO: 0.2
OS_RATIO: 0.2

## 2018-05-16 ASSESSMENT — VISUAL ACUITY
OS_CC+: -2
CORRECTION_TYPE: GLASSES
OS_PH_CC: 20/25
OD_CC: 20/20
OD_CC+: -2
METHOD: SNELLEN - LINEAR
OS_CC: 20/30

## 2018-05-16 ASSESSMENT — EXTERNAL EXAM - RIGHT EYE: OD_EXAM: NORMAL

## 2018-05-16 ASSESSMENT — EXTERNAL EXAM - LEFT EYE: OS_EXAM: NORMAL

## 2018-05-16 NOTE — PROGRESS NOTES
I have confirmed the patient's and reviewed Past Medical History, Past Surgical History, Social History, Family History, Problem List, Medication List and agree with Tech note.    CC: floaters right eye       HPI: floaters resolved, no flashes     Assessment/plan:   1.  Posterior vitreous detachment (PVD) right eye    - Informed patient    - Retinal detachment/retinal tear precautions discussed with patient  Contact clinic immediately for new floaters/flashers or shadows in vision     2.  Nuclear sclerotic cataract  Both eyes    - Not visually significant   - received Rx last visit   - monitor yearly     RTC 1 year with dilated fundus exam    Leonidas Lora MD, PhD  Vitreoretinal Surgery Fellow    Attestation:  I have seen and examined the patient with Dr. Lora and agree with the findings in this note, as well as the interpretations of the diagnostic tests.      Teddy Gifford MD PhD.  Professor & Chair

## 2018-05-16 NOTE — NURSING NOTE
Chief Complaints and History of Present Illnesses   Patient presents with     Follow Up For     6 month follow up  Posterior vitreous detachment (PVD) right eye      HPI    Affected eye(s):  Both   Symptoms:     No floaters   No flashes   No redness   No tearing   No Dryness   No itching         Do you have eye pain now?:  No      Comments:  Pt states that vision is occasionally cloudy. Vision clears with blinking and artificial tear usage.     Berenice BARKER May 16, 2018 10:43 AM

## 2018-05-16 NOTE — MR AVS SNAPSHOT
After Visit Summary   5/16/2018    Mauricio Barrera    MRN: [id number]           Patient Information     Date Of Birth          1950        Visit Information        Provider Department      5/16/2018 10:30 AM [facility]        Today's Diagnoses     PVD (posterior vitreous detachment), right eye - Right Eye    -  1       Follow-ups after your visit        Follow-up notes from your care team     Return in about 1 year (around 5/16/2019) for DFE, PVD.      Who to contact     Please call your clinic at [phone] to:    Ask questions about your health    Make or cancel appointments    Discuss your medicines    Learn about your test results    Speak to your doctor            Additional Information About Your Visit        Room n Househart Information     [employer] gives you secure access to your electronic health record. If you see a primary care provider, you can also send messages to your care team and make appointments. If you have questions, please call your primary care clinic.  If you do not have a primary care provider, please call [phone] and they will assist you.      [employer] is an electronic gateway that provides easy, online access to your medical records. With [employer], you can request a clinic appointment, read your test results, renew a prescription or communicate with your care team.     To access your existing account, please contact your [facility] or call [phone] for assistance.        Care EveryWhere ID     This is your Care EveryWhere ID. This could be used by other organizations to access your [place] medical records  [id number]         Blood Pressure from Last 3 Encounters:   04/16/18 120/87   11/15/17 132/88   10/24/17 (!) 131/94    Weight from Last 3 Encounters:   04/16/18 138.1 kg (304 lb 8 oz)   03/28/18 137.9 kg (304 lb)   11/15/17 (!) 137.4 kg (303 lb)              Today, you had the following     No orders  found for display       Primary Care Provider Office Phone # Fax #    Leon Pierce Hinojosa [phone] [address] [place], [place] [id number]        Equal Access to Services     ALEXSANDRA ALLEN : Krzysztof higgins sylvestero Sovinayakali, waaxda luqadaha, qaybta kaalmada ely, nancy vasquez laPolyzulma elizabeth. So [facility] [phone].    ATENCIÓN: Si habla español, tiene a crawford disposición servicios gratuitos de asistencia lingüística. Llame al [phone].    We comply with applicable federal civil rights laws and [place] laws. We do not discriminate on the basis of race, color, national origin, age, disability, sex, sexual orientation, or gender identity.            Thank you!     Thank you for choosing [facility]  for your care. Our goal is always to provide you with excellent care. Hearing back from our patients is one way we can continue to improve our services. Please take a few minutes to complete the written survey that you may receive in the mail after your visit with us. Thank you!             Your Updated Medication List - Protect others around you: Learn how to safely use, store and throw away your medicines at www.disposemymeds.org.          This list is accurate as of 5/16/18 12:09 PM.  Always use your most recent med list.                   Brand Name Dispense Instructions for use Diagnosis    lisinopril 20 MG tablet    PRINIVIL/ZESTRIL    90 tablet    Take 1 tablet (20 mg) by mouth daily    Hypertension goal BP (blood pressure) < 140/90       simvastatin 20 MG tablet    ZOCOR    90 tablet    Take 1 tablet (20 mg) by mouth At Bedtime    Hyperlipidemia with target LDL less than 130

## 2019-01-31 ENCOUNTER — MYC MEDICAL ADVICE (OUTPATIENT)
Dept: FAMILY MEDICINE | Facility: CLINIC | Age: 69
End: 2019-01-31

## 2019-02-01 NOTE — TELEPHONE ENCOUNTER
Please see my chart message and response     Thank you,   Shanna Charlton, Registered Nurse   [facility]

## 2019-04-05 ENCOUNTER — MYC MEDICAL ADVICE (OUTPATIENT)
Dept: FAMILY MEDICINE | Facility: CLINIC | Age: 69
End: 2019-04-05

## 2019-04-09 ENCOUNTER — OFFICE VISIT (OUTPATIENT)
Dept: FAMILY MEDICINE | Facility: CLINIC | Age: 69
End: 2019-04-09
Payer: COMMERCIAL

## 2019-04-09 VITALS
TEMPERATURE: 97.9 F | HEIGHT: 77 IN | SYSTOLIC BLOOD PRESSURE: 166 MMHG | DIASTOLIC BLOOD PRESSURE: 104 MMHG | WEIGHT: 298 LBS | BODY MASS INDEX: 35.19 KG/M2 | RESPIRATION RATE: 12 BRPM | OXYGEN SATURATION: 96 % | HEART RATE: 64 BPM

## 2019-04-09 DIAGNOSIS — I10 HYPERTENSION GOAL BP (BLOOD PRESSURE) < 140/90: ICD-10-CM

## 2019-04-09 DIAGNOSIS — E78.5 HYPERLIPIDEMIA WITH TARGET LDL LESS THAN 130: ICD-10-CM

## 2019-04-09 DIAGNOSIS — R73.01 IMPAIRED FASTING GLUCOSE: ICD-10-CM

## 2019-04-09 DIAGNOSIS — M25.551 HIP PAIN, RIGHT: ICD-10-CM

## 2019-04-09 DIAGNOSIS — Z00.00 ROUTINE GENERAL MEDICAL EXAMINATION AT A HEALTH CARE FACILITY: Primary | ICD-10-CM

## 2019-04-09 LAB
ALBUMIN SERPL-MCNC: 3.6 G/DL (ref 3.4–5)
ALP SERPL-CCNC: 54 U/L (ref 40–150)
ALT SERPL W P-5'-P-CCNC: 55 U/L (ref 0–70)
ANION GAP SERPL CALCULATED.3IONS-SCNC: 8 MMOL/L (ref 3–14)
AST SERPL W P-5'-P-CCNC: 58 U/L (ref 0–45)
BILIRUB SERPL-MCNC: 0.6 MG/DL (ref 0.2–1.3)
BUN SERPL-MCNC: 20 MG/DL (ref 7–30)
CALCIUM SERPL-MCNC: 8.5 MG/DL (ref 8.5–10.1)
CHLORIDE SERPL-SCNC: 105 MMOL/L (ref 94–109)
CHOLEST SERPL-MCNC: 178 MG/DL
CO2 SERPL-SCNC: 26 MMOL/L (ref 20–32)
CREAT SERPL-MCNC: 0.68 MG/DL (ref 0.66–1.25)
GFR SERPL CREATININE-BSD FRML MDRD: >90 ML/MIN/{1.73_M2}
GLUCOSE SERPL-MCNC: 123 MG/DL (ref 70–99)
HBA1C MFR BLD: 6.3 % (ref 0–5.6)
HDLC SERPL-MCNC: 31 MG/DL
LDLC SERPL CALC-MCNC: 94 MG/DL
NONHDLC SERPL-MCNC: 147 MG/DL
POTASSIUM SERPL-SCNC: 3.7 MMOL/L (ref 3.4–5.3)
PROT SERPL-MCNC: 8 G/DL (ref 6.8–8.8)
SODIUM SERPL-SCNC: 139 MMOL/L (ref 133–144)
TRIGL SERPL-MCNC: 267 MG/DL

## 2019-04-09 PROCEDURE — 99213 OFFICE O/P EST LOW 20 MIN: CPT | Mod: 25 | Performed by: FAMILY MEDICINE

## 2019-04-09 PROCEDURE — 80061 LIPID PANEL: CPT | Performed by: FAMILY MEDICINE

## 2019-04-09 PROCEDURE — G0439 PPPS, SUBSEQ VISIT: HCPCS | Performed by: FAMILY MEDICINE

## 2019-04-09 PROCEDURE — 80053 COMPREHEN METABOLIC PANEL: CPT | Performed by: FAMILY MEDICINE

## 2019-04-09 PROCEDURE — 36415 COLL VENOUS BLD VENIPUNCTURE: CPT | Performed by: FAMILY MEDICINE

## 2019-04-09 PROCEDURE — 83036 HEMOGLOBIN GLYCOSYLATED A1C: CPT | Performed by: FAMILY MEDICINE

## 2019-04-09 RX ORDER — TELMISARTAN 40 MG/1
40 TABLET ORAL DAILY
COMMUNITY
Start: 2019-04-09 | End: 2020-01-01

## 2019-04-09 ASSESSMENT — PATIENT HEALTH QUESTIONNAIRE - PHQ9: SUM OF ALL RESPONSES TO PHQ QUESTIONS 1-9: 5

## 2019-04-09 ASSESSMENT — MIFFLIN-ST. JEOR: SCORE: 2229.1

## 2019-04-09 NOTE — PROGRESS NOTES
"  SUBJECTIVE:   Mauricio Barrera is a 68 year old male who presents for Preventive Visit.    Are you in the first 12 months of your Medicare Part B coverage?  No    Physical Health:    In general, how would you rate your overall physical health? good    Outside of work, how many days during the week do you exercise? none    Outside of work, approximately how many minutes a day do you exercise?not applicable    If you drink alcohol do you typically have >3 drinks per day or >7 drinks per week? No    Do you usually eat at least 4 servings of fruit and vegetables a day, include whole grains & fiber and avoid regularly eating high fat or \"junk\" foods? Yes    Do you have any problems taking medications regularly?  No    Do you have any side effects from medications? none    Needs assistance for the following daily activities: no assistance needed    Which of the following safety concerns are present in your home?  none identified     Hearing impairment: No    In the past 6 months, have you been bothered by leaking of urine? no    Mental Health:    In general, how would you rate your overall mental or emotional health? good  PHQ-2 Score:      Do you feel safe in your environment? Yes    Do you have a Health Care Directive? Yes: Advance Directive has been received and scanned.    Additional concerns to address?  No    Fall risk:  Fallen 2 or more times in the past year?: No  Any fall with injury in the past year?: No    Cognitive Screenin) Repeat 3 items (Leader, Season, Table)    2) Clock draw: NORMAL  3) 3 item recall:  Recalls 3 objects  Results: 3 items recalled: COGNITIVE IMPAIRMENT LESS LIKELY    Mini-CogTM Copyright [employer]. Licensed by the author for use in [facility]; reprinted with permission ([email or web address]@.[email or web address]). All rights reserved.      Do you have sleep apnea, excessive snoring or daytime drowsiness?: no    Reviewed and updated as needed this visit by clinical staff    Reviewed and updated as " needed this visit by Provider        Social History     Tobacco Use     Smoking status: Former Smoker     Types: Cigars     Last attempt to quit: 1/1/2004     Years since quitting: 15.2     Smokeless tobacco: Never Used   Substance Use Topics     Alcohol use: Yes     Comment: rarely                            Current providers sharing in care for this patient include:    Patient Care Team:  Leon Hinojosa MD as PCP - General (Family Practice)  Leon Hinojosa MD as Assigned PCP  Magdalena Chatterjee RN as Clinic Care Coordinator (Nurse)  Leon Hinojosa MD as Referring Physician (Family Practice)  Vaughn Garland MD as MD (Urology)  Dorothy Boo, RN as Registered Nurse (Urology)  Teddy Germain MD as MD (Ophthalmology)    The following health maintenance items are reviewed in Epic and correct as of today:  Health Maintenance   Topic Date Due     INFLUENZA VACCINE (1) 09/01/2018     A1C Q6 MO  10/16/2018     LIPID MONITORING Q6 MO  10/16/2018     MEDICARE ANNUAL WELLNESS VISIT  11/15/2018     PHQ-2 Q1 YR  04/16/2019     FALL RISK ASSESSMENT  05/16/2019     ADVANCE DIRECTIVE PLANNING Q5 YRS  01/19/2020     COLON CANCER SCREEN (SYSTEM ASSIGNED)  04/11/2024     DTAP/TDAP/TD IMMUNIZATION (4 - Td) 11/16/2026     PNEUMOCOCCAL IMMUNIZATION 65+ LOW/MEDIUM RISK  Completed     ZOSTER IMMUNIZATION  Completed     AORTIC ANEURYSM SCREENING (SYSTEM ASSIGNED)  Completed     HEPATITIS C SCREENING  Completed     IPV IMMUNIZATION  Aged Out     MENINGITIS IMMUNIZATION  Aged Out     Labs reviewed in EPIC     Wifes memory is getting worse. Worried about her.     His bp medications were changed in [place]- currently on 40mg of telmisartan dose. Forget to take dose today.   He had some leg swelling and was on amlodipine 20mg. He spends most of his time in [place]. Does not need medication.   Going to spend around 9 of 12 months for now and going to increase it slowly.     Having right hip  "pain. Feels could be bursititis and also having bone pain. Hard to climb up. Most of the time hard to most of the climbing activities.     ROS:  Constitutional, HEENT, cardiovascular, pulmonary, GI, , musculoskeletal, neuro, skin, endocrine and psych systems are negative, except as otherwise noted.    OBJECTIVE:   BP (!) 166/104   Pulse 64   Temp 97.9  F (36.6  C) (Oral)   Resp 12   Ht 1.956 m (6' 5\")   Wt 135.2 kg (298 lb)   SpO2 96%   BMI 35.34 kg/m   Estimated body mass index is 35.34 kg/m  as calculated from the following:    Height as of this encounter: 1.956 m (6' 5\").    Weight as of this encounter: 135.2 kg (298 lb).  EXAM:   GENERAL: healthy, alert and no distress  EYES: Eyes grossly normal to inspection, PERRL and conjunctivae and sclerae normal  HENT: ear canals and TM's normal, nose and mouth without ulcers or lesions  NECK: no adenopathy, no asymmetry, masses, or scars and thyroid normal to palpation  RESP: lungs clear to auscultation - no rales, rhonchi or wheezes  CV: regular rate and rhythm, normal S1 S2, no S3 or S4, no murmur, click or rub, no peripheral edema and peripheral pulses strong  ABDOMEN: soft, nontender, no hepatosplenomegaly, no masses and bowel sounds normal  MS: no gross musculoskeletal defects noted, no edema, right hip range of motion restricted.  Antalgic gait  SKIN: no suspicious lesions or rashes  NEURO: Normal strength and tone, mentation intact and speech normal  PSYCH: mentation appears normal, affect normal/bright       ASSESSMENT / PLAN:    (Z00.00) Routine general medical examination at a health care facility  (primary encounter diagnosis)  Comment:   Plan: Spends most of his time in [place] and may be moving there permanently.  Most likely will come in in another year or so for his physical.  Gets all of his medication from Drs in [place]    (M25.551) Hip pain, right  Comment: With possible trochanteric bursitis.  He is significantly struggling with his symptoms and " "OA cannot be ruled out completely.  I will refer him to sports medicine for further evaluation.  Plan: ORTHO  REFERRAL            (I10) Hypertension goal BP (blood pressure) < 140/90  Comment: His blood pressure medication has been switched in the.  He is noticing significant improvement in his blood pressure but today he forgot to take his medication.  He is going to take his medication and going to come back in a month for blood pressure recheck.  Plan: Comprehensive metabolic panel            (E78.5) Hyperlipidemia with target LDL less than 130  Comment:   Plan: Lipid panel reflex to direct LDL Fasting,         Comprehensive metabolic panel            (R73.01) Impaired fasting glucose  Comment: And he is obese.  Continue to keep an eye on his A1c.  Plan: Hemoglobin A1c                End of Life Planning:  Patient currently has an advanced directive: Full code    COUNSELING:  Reviewed preventive health counseling, as reflected in patient instructions  Special attention given to:       Regular exercise       Healthy diet/nutrition       Vision screening       Hearing screening       Dental care       Bladder control       Colon cancer screening       Prostate cancer screening    BP Readings from Last 1 Encounters:   04/09/19 (!) 166/104     Estimated body mass index is 35.34 kg/m  as calculated from the following:    Height as of this encounter: 1.956 m (6' 5\").    Weight as of this encounter: 135.2 kg (298 lb).      Weight management plan: Discussed healthy diet and exercise guidelines     reports that he quit smoking about 15 years ago. His smoking use included cigars. He has never used smokeless tobacco.      Appropriate preventive services were discussed with this patient, including applicable screening as appropriate for cardiovascular disease, diabetes, osteopenia/osteoporosis, and glaucoma.  As appropriate for age/gender, discussed screening for colorectal cancer, prostate cancer, breast cancer, " and cervical cancer. Checklist reviewing preventive services available has been given to the patient.    Reviewed patients plan of care and provided an AVS. The Basic Care Plan (routine screening as documented in Health Maintenance) for Mauricio meets the Care Plan requirement. This Care Plan has been established and reviewed with the Patient.    Counseling Resources:  ATP IV Guidelines  Pooled Cohorts Equation Calculator  Breast Cancer Risk Calculator  FRAX Risk Assessment  ICSI Preventive Guidelines  Dietary Guidelines for Americans, 2010  USDA's MyPlate  ASA Prophylaxis  Lung CA Screening    Leon Hinojosa MD, MD  [facility] [place][note 2]

## 2019-04-09 NOTE — PATIENT INSTRUCTIONS
Patient Education   Personalized Prevention Plan  You are due for the preventive services outlined below.  Your care team is available to assist you in scheduling these services.  If you have already completed any of these items, please share that information with your care team to update in your medical record.  Health Maintenance Due   Topic Date Due     Flu Vaccine (1) 09/01/2018     A1C (Diabetes) Lab - every 6 months  10/16/2018     Cholesterol Lab - every 6 months  10/16/2018     Annual Wellness Visit  11/15/2018     Depression Assessment 2 - yearly  04/16/2019

## 2019-04-10 NOTE — PROGRESS NOTES
[facility]   Clinic Visit s Apr 15, 2019    PCP: Leon Hinojosa      Mauricio is a 68 year old male who is seen as self referral for   Chief Complaint   Patient presents with     Right Hip - Pain       Injury: Reports insidious onset without acute precipitating event. Pain that worsens when he travels on planes.  Acute episode of pain with spreading legs while sitting 5 months ago.      Location of Pain: right hip lateral, nonradiating   Duration of Pain: 5 month(s)  Rating of Pain at worst: 8/10  Rating of Pain Currently: 3/10  Pain is better with: activity avoidance   Pain is worse with: sleeping, walking, stairs  Treatment so far consists of: massage  Associated symptoms: no distal numbness or tingling; denies swelling or warmth  Recent imaging completed: No recent imaging completed.  Prior History of related problems: neuropathy in feet    Pain sleeping on RIGHT side also      Social History: retired     Past Medical History:   Diagnosis Date     Calculus of gallbladder without mention of cholecystitis or obstruction 2006    screened,  neg for AAA,,Crtd stnss,PAD     Hyperlipidemia LDL goal < 130      Hypertension      Incidental lung nodule, > 3mm and < 8mm 2/04    3.5 mm nodule right lung base - stable by CT x 2 years     Lumbago        Patient Active Problem List    Diagnosis Date Noted     Health Care Home 09/29/2016     Priority: Medium     Foot osteomyelitis, right (H) 09/21/2016     Priority: Medium     Advanced directives, counseling/discussion 01/19/2015     Priority: Medium     Advance Care Planning:   Receipt of ACP document:  Received: Health Care Directive which was witnessed or notarized on 11/19/2014.  Document not previously scanned.  Validation form completed and sent with document to be scanned.  Confirmed/documented designated decision maker(s). See permanent comments section of demographics in clinical tab. View document(s) and details by clicking on  code status.   Added by Cande Figueroa on 1/19/2015.             Obesity 03/12/2014     Priority: Medium     Problem list name updated by automated process. Provider to review       Toe ulcer (H) 03/11/2014     Priority: Medium     Impaired fasting glucose 03/11/2014     Priority: Medium     Peripheral neuropathy 03/06/2014     Priority: Medium     Torsion, testicular 11/22/2013     Priority: Medium     Previous history, had surgery - no removal of testicle.        Hypertension goal BP (blood pressure) < 140/90 03/15/2011     Priority: Medium     Hyperlipidemia with target LDL less than 130      Priority: Medium     Diagnosis updated by automated process. Provider to review and confirm.         Family History   Problem Relation Age of Onset     Cancer Mother         Would not permit any exams or investigation,so dx inferred by family     C.A.D. Father         50's     Respiratory Brother         COPD, smoker     Family History Negative Brother      Glaucoma No family hx of      Macular Degeneration No family hx of        Social History     Socioeconomic History     Marital status:      Spouse name: Cathleen     Number of children: 1     Years of education: Not on file     Highest education level: Not on file   Occupational History     Not on file   Social Needs     Financial resource strain: Not on file     Food insecurity:     Worry: Not on file     Inability: Not on file     Transportation needs:     Medical: Not on file     Non-medical: Not on file   Tobacco Use     Smoking status: Former Smoker     Types: Cigars     Last attempt to quit: 1/1/2004     Years since quitting: 15.2     Smokeless tobacco: Never Used   Substance and Sexual Activity     Alcohol use: Yes     Comment: rarely      Drug use: No       Past Surgical History:   Procedure Laterality Date     AMPUTATE FOOT Right 9/22/2016    Procedure: AMPUTATE FOOT;  Surgeon: Melchor Auguste DPM;  Location:  OR     AMPUTATE FOOT Right 9/24/2016     "Procedure: AMPUTATE FOOT;  Surgeon: Venkat Ramos DPM;  Location: [facility] OR     AMPUTATE TOE(S)  5/13/2014    Procedure: AMPUTATE TOE(S);  Surgeon: Venkat Ramos DPM;  [facility]: [facility]     C NONSPECIFIC PROCEDURE      tonsillectomy     C NONSPECIFIC PROCEDURE      hernia surgery     C NONSPECIFIC PROCEDURE  5/04    reduction torsion left testis     HC COLONOSCOPY THRU STOMA, DIAGNOSTIC  3/03    diverticulae,some inflamed,hemorrhoids     HEMORRHOIDECTOMY  4/07    single,anterior     IRRIGATION AND DEBRIDEMENT FOOT, COMBINED Right 9/22/2016    Procedure: COMBINED IRRIGATION AND DEBRIDEMENT FOOT;  Surgeon: Melchor Auguste DPM;  Location: [facility] OR     REPAIR HAMMER TOE  5/13/2014    Procedure: REPAIR HAMMER TOE;  Surgeon: Venkat Ramos DPM;  Location: [facility]             Review of Systems   Musculoskeletal: Positive for joint pain. Negative for back pain.   All other systems reviewed and are negative.        Physical Exam  BP (!) 154/93   Ht 1.956 m (6' 5\")   Wt 135.2 kg (298 lb)   BMI 35.34 kg/m    Constitutional:well-developed, well-nourished, and in no distress.   Cardiovascular: Intact distal pulses.    Neurological: alert. Gait Normal:   Gait, station, stance, and balance appear normal for age  Skin: Skin is warm and dry.   Psychiatric: Mood and affect normal.   Respiratory: unlabored, speaks in full sentences  Lymph: no LAD, no lymphangitis            Right Hip Exam     Tenderness   The patient is experiencing tenderness in the greater trochanter.    Range of Motion   Abduction: normal   Adduction: normal   Flexion: normal   External rotation: normal   Internal rotation: normal     Muscle Strength   Abduction: 4/5   Adduction: 5/5   Flexion: 5/5     Tests   MACK: positive  Jimmie: negative    Other   Erythema: absent  Scars: absent  Sensation: normal  Pulse: present                       X-ray images Ordered and independently reviewed by me in the office today with the patient. X-ray shows:   "   Recent Results (from the past 744 hour(s))   XR Pelvis and Hip Right 1 View    Narrative    XR PELVIS AND RIGHT HIP ONE VIEW  4/15/2019 2:36 PM     HISTORY: Hip pain, right.    COMPARISON: None.      Impression    IMPRESSION: Both femoral heads articulate normally in their respective  acetabula. No acute fracture. SI joints intact.    KOREY MAYEN MD          ASSESSMENT/PLAN    ICD-10-CM    1. Trochanteric bursitis of right hip M70.61 XR Pelvis and Hip Right 1 View     Large Joint Injection/Arthocentesis: R greater trochanteric bursa     [facility]US PT, HAND, AND CHIROPRACTIC REFERRAL   2. Hip pain, right M25.551 XR Pelvis and Hip Right 1 View     Discussed nature of syndrome as being related to friction of IT band across greater trochanter, and likelihood of muscular imbalance of hip flexors being greater than hip extensors as being the cause for the imbalance.  Offered cortisone injection for pain relief, to be followed by hip stabilization exercises for long term relief.  Patient agrees to proceed          Large Joint Injection/Arthocentesis: R greater trochanteric bursa  Date/Time: 4/15/2019 2:50 PM  Performed by: Beltran Snider MD  Authorized by: Beltran Snider MD     Indications:  Pain  Needle Size:  22 G  Guidance: landmark guided    Approach:  Lateral  Location:  Hip        Site:  R greater trochanteric bursa    Medications:  4 mL lidocaine 1 %; 40 mg triamcinolone 40 MG/ML; 5 mL lidocaine 1 %  Outcome:  Tolerated well, no immediate complications  Procedure discussed: discussed risks, benefits, and alternatives    Consent Given by:  Parent  Timeout: timeout called immediately prior to procedure    Prep: patient was prepped and draped in usual sterile fashion     Lot: [id number] EXP: 04/2020

## 2019-04-15 ENCOUNTER — ANCILLARY PROCEDURE (OUTPATIENT)
Dept: GENERAL RADIOLOGY | Facility: CLINIC | Age: 69
End: 2019-04-15
Attending: FAMILY MEDICINE
Payer: COMMERCIAL

## 2019-04-15 ENCOUNTER — OFFICE VISIT (OUTPATIENT)
Dept: ORTHOPEDICS | Facility: CLINIC | Age: 69
End: 2019-04-15
Payer: COMMERCIAL

## 2019-04-15 VITALS
BODY MASS INDEX: 35.19 KG/M2 | HEIGHT: 77 IN | WEIGHT: 298 LBS | SYSTOLIC BLOOD PRESSURE: 154 MMHG | DIASTOLIC BLOOD PRESSURE: 93 MMHG

## 2019-04-15 DIAGNOSIS — M25.551 HIP PAIN, RIGHT: ICD-10-CM

## 2019-04-15 DIAGNOSIS — M70.61 TROCHANTERIC BURSITIS OF RIGHT HIP: Primary | ICD-10-CM

## 2019-04-15 PROCEDURE — 73502 X-RAY EXAM HIP UNI 2-3 VIEWS: CPT

## 2019-04-15 PROCEDURE — 99203 OFFICE O/P NEW LOW 30 MIN: CPT | Mod: 25 | Performed by: FAMILY MEDICINE

## 2019-04-15 PROCEDURE — 20610 DRAIN/INJ JOINT/BURSA W/O US: CPT | Mod: RT | Performed by: FAMILY MEDICINE

## 2019-04-15 RX ADMIN — TRIAMCINOLONE ACETONIDE 40 MG: 40 INJECTION, SUSPENSION INTRA-ARTICULAR; INTRAMUSCULAR at 14:50

## 2019-04-15 RX ADMIN — LIDOCAINE HYDROCHLORIDE 5 ML: 10 INJECTION, SOLUTION INFILTRATION; PERINEURAL at 14:50

## 2019-04-15 RX ADMIN — LIDOCAINE HYDROCHLORIDE 4 ML: 10 INJECTION, SOLUTION INFILTRATION; PERINEURAL at 14:50

## 2019-04-15 ASSESSMENT — MIFFLIN-ST. JEOR: SCORE: 2229.1

## 2019-04-15 NOTE — LETTER
4/15/2019         RE: Mauricio Barrera  [address] [place] [address]        Dear Colleague,    Thank you for referring your patient, Mauricio Barrera, to the  SPORTS MEDICINE. Please see a copy of my visit note below.    [facility]   Clinic Visit s Apr 15, 2019    PCP: Leon Hinojosa      Mauricio is a 68 year old male who is seen as self referral for   Chief Complaint   Patient presents with     Right Hip - Pain       Injury: Reports insidious onset without acute precipitating event. Pain that worsens when he travels on planes.  Acute episode of pain with spreading legs while sitting 5 months ago.      Location of Pain: right hip lateral, nonradiating   Duration of Pain: 5 month(s)  Rating of Pain at worst: 8/10  Rating of Pain Currently: 3/10  Pain is better with: activity avoidance   Pain is worse with: sleeping, walking, stairs  Treatment so far consists of: massage  Associated symptoms: no distal numbness or tingling; denies swelling or warmth  Recent imaging completed: No recent imaging completed.  Prior History of related problems: neuropathy in feet    Pain sleeping on RIGHT side also      Social History: retired     Past Medical History:   Diagnosis Date     Calculus of gallbladder without mention of cholecystitis or obstruction 2006    screened,  neg for AAA,,Crtd stnss,PAD     Hyperlipidemia LDL goal < 130      Hypertension      Incidental lung nodule, > 3mm and < 8mm 2/04    3.5 mm nodule right lung base - stable by CT x 2 years     Lumbago        Patient Active Problem List    Diagnosis Date Noted     Health Care Home 09/29/2016     Priority: Medium     Foot osteomyelitis, right (H) 09/21/2016     Priority: Medium     Advanced directives, counseling/discussion 01/19/2015     Priority: Medium     Advance Care Planning:   Receipt of ACP document:  Received: Health Care Directive which was witnessed or notarized on 11/19/2014.  Document not  previously scanned.  Validation form completed and sent with document to be scanned.  Confirmed/documented designated decision maker(s). See permanent comments section of demographics in clinical tab. View document(s) and details by clicking on code status.   Added by Cande Figueroa on 1/19/2015.             Obesity 03/12/2014     Priority: Medium     Problem list name updated by automated process. Provider to review       Toe ulcer (H) 03/11/2014     Priority: Medium     Impaired fasting glucose 03/11/2014     Priority: Medium     Peripheral neuropathy 03/06/2014     Priority: Medium     Torsion, testicular 11/22/2013     Priority: Medium     Previous history, had surgery - no removal of testicle.        Hypertension goal BP (blood pressure) < 140/90 03/15/2011     Priority: Medium     Hyperlipidemia with target LDL less than 130      Priority: Medium     Diagnosis updated by automated process. Provider to review and confirm.         Family History   Problem Relation Age of Onset     Cancer Mother         Would not permit any exams or investigation,so dx inferred by family     C.A.D. Father         50's     Respiratory Brother         COPD, smoker     Family History Negative Brother      Glaucoma No family hx of      Macular Degeneration No family hx of        Social History     Socioeconomic History     Marital status:      Spouse name: Cathleen     Number of children: 1     Years of education: Not on file     Highest education level: Not on file   Occupational History     Not on file   Social Needs     Financial resource strain: Not on file     Food insecurity:     Worry: Not on file     Inability: Not on file     Transportation needs:     Medical: Not on file     Non-medical: Not on file   Tobacco Use     Smoking status: Former Smoker     Types: Cigars     Last attempt to quit: 1/1/2004     Years since quitting: 15.2     Smokeless tobacco: Never Used   Substance and Sexual Activity     Alcohol use: Yes      "Comment: rarely      Drug use: No       Past Surgical History:   Procedure Laterality Date     AMPUTATE FOOT Right 9/22/2016    Procedure: AMPUTATE FOOT;  Surgeon: Melchor Auguste DPM;  Location: [facility]     AMPUTATE FOOT Right 9/24/2016    Procedure: AMPUTATE FOOT;  Surgeon: Venkat Ramos DPM;  Location: [facility]     AMPUTATE TOE(S)  5/13/2014    Procedure: AMPUTATE TOE(S);  Surgeon: Venkat Ramos DPM;  [facility]     C NONSPECIFIC PROCEDURE      tonsillectomy     C NONSPECIFIC PROCEDURE      hernia surgery     C NONSPECIFIC PROCEDURE  5/04    reduction torsion left testis     HC COLONOSCOPY THRU STOMA, DIAGNOSTIC  3/03    diverticulae,some inflamed,hemorrhoids     HEMORRHOIDECTOMY  4/07    single,anterior     IRRIGATION AND DEBRIDEMENT FOOT, COMBINED Right 9/22/2016    Procedure: COMBINED IRRIGATION AND DEBRIDEMENT FOOT;  Surgeon: Melchor Auguste DPM;  Location: [facility]     REPAIR HAMMER TOE  5/13/2014    Procedure: REPAIR HAMMER TOE;  Surgeon: Venkat Ramos DPM;  [facility]             Review of Systems   Musculoskeletal: Positive for joint pain. Negative for back pain.   All other systems reviewed and are negative.        Physical Exam  BP (!) 154/93   Ht 1.956 m (6' 5\")   Wt 135.2 kg (298 lb)   BMI 35.34 kg/m     Constitutional:well-developed, well-nourished, and in no distress.   Cardiovascular: Intact distal pulses.    Neurological: alert. Gait Normal:   Gait, station, stance, and balance appear normal for age  Skin: Skin is warm and dry.   Psychiatric: Mood and affect normal.   Respiratory: unlabored, speaks in full sentences  Lymph: no LAD, no lymphangitis            Right Hip Exam     Tenderness   The patient is experiencing tenderness in the greater trochanter.    Range of Motion   Abduction: normal   Adduction: normal   Flexion: normal   External rotation: normal   Internal rotation: normal     Muscle Strength   Abduction: 4/5   Adduction: 5/5   Flexion: 5/5     Tests "   MACK: positive  Jimmie: negative    Other   Erythema: absent  Scars: absent  Sensation: normal  Pulse: present                       X-ray images Ordered and independently reviewed by me in the office today with the patient. X-ray shows:     Recent Results (from the past 744 hour(s))   XR Pelvis and Hip Right 1 View    Narrative    XR PELVIS AND RIGHT HIP ONE VIEW  4/15/2019 2:36 PM     HISTORY: Hip pain, right.    COMPARISON: None.      Impression    IMPRESSION: Both femoral heads articulate normally in their respective  acetabula. No acute fracture. SI joints intact.    KOREY MAYEN MD          ASSESSMENT/PLAN    ICD-10-CM    1. Trochanteric bursitis of right hip M70.61 XR Pelvis and Hip Right 1 View     Large Joint Injection/Arthocentesis: R greater trochanteric bursa     [facility] PT, HAND, AND CHIROPRACTIC REFERRAL   2. Hip pain, right M25.551 XR Pelvis and Hip Right 1 View     Discussed nature of syndrome as being related to friction of IT band across greater trochanter, and likelihood of muscular imbalance of hip flexors being greater than hip extensors as being the cause for the imbalance.  Offered cortisone injection for pain relief, to be followed by hip stabilization exercises for long term relief.  Patient agrees to proceed          Large Joint Injection/Arthocentesis: R greater trochanteric bursa  Date/Time: 4/15/2019 2:50 PM  Performed by: Beltran Snider MD  Authorized by: Beltran Snider MD     Indications:  Pain  Needle Size:  22 G  Guidance: landmark guided    Approach:  Lateral  Location:  Hip        Site:  R greater trochanteric bursa    Medications:  4 mL lidocaine 1 %; 40 mg triamcinolone 40 MG/ML; 5 mL lidocaine 1 %  Outcome:  Tolerated well, no immediate complications  Procedure discussed: discussed risks, benefits, and alternatives    Consent Given by:  Parent  Timeout: timeout called immediately prior to procedure    Prep: patient was prepped and draped in usual sterile  fashion     Lot: [id number] EXP: 04/2020                Again, thank you for allowing me to participate in the care of your patient.        Sincerely,        Beltran Snider MD

## 2019-04-16 RX ORDER — LIDOCAINE HYDROCHLORIDE 10 MG/ML
4 INJECTION, SOLUTION INFILTRATION; PERINEURAL
Status: DISCONTINUED | OUTPATIENT
Start: 2019-04-15 | End: 2020-01-01

## 2019-04-16 RX ORDER — LIDOCAINE HYDROCHLORIDE 10 MG/ML
5 INJECTION, SOLUTION INFILTRATION; PERINEURAL
Status: DISCONTINUED | OUTPATIENT
Start: 2019-04-15 | End: 2020-01-01

## 2019-04-16 RX ORDER — TRIAMCINOLONE ACETONIDE 40 MG/ML
40 INJECTION, SUSPENSION INTRA-ARTICULAR; INTRAMUSCULAR
Status: DISCONTINUED | OUTPATIENT
Start: 2019-04-15 | End: 2020-01-01

## 2019-04-16 ASSESSMENT — ENCOUNTER SYMPTOMS: BACK PAIN: 0

## 2019-04-19 ENCOUNTER — HOSPITAL ENCOUNTER (EMERGENCY)
Facility: CLINIC | Age: 69
Discharge: HOME | End: 2019-04-20
Attending: EMERGENCY MEDICINE | Admitting: EMERGENCY MEDICINE
Payer: COMMERCIAL

## 2019-04-19 DIAGNOSIS — K62.5 RECTAL BLEEDING: ICD-10-CM

## 2019-04-19 PROCEDURE — 99283 EMERGENCY DEPT VISIT LOW MDM: CPT

## 2019-04-19 NOTE — ED AVS SNAPSHOT
Emergency Department  [address] [place] [address]  Phone:  [phone]  Fax:  [phone]                                    Mauricio Barrera   MRN: [id number]    Department:   Emergency Department   Date of Visit:  4/19/2019           After Visit Summary Signature Page    I have received my discharge instructions, and my questions have been answered. I have discussed any challenges I see with this plan with the nurse or doctor.    ..........................................................................................................................................  Patient/Patient Representative Signature      ..........................................................................................................................................  Patient Representative Print Name and Relationship to Patient    ..................................................               ................................................  Date                                   Time    ..........................................................................................................................................  Reviewed by Signature/Title    ...................................................              ..............................................  Date                                               Time          22EPIC Rev 08/18

## 2019-04-20 VITALS
OXYGEN SATURATION: 94 % | RESPIRATION RATE: 16 BRPM | WEIGHT: 298 LBS | DIASTOLIC BLOOD PRESSURE: 89 MMHG | TEMPERATURE: 98.3 F | BODY MASS INDEX: 35.19 KG/M2 | HEIGHT: 77 IN | HEART RATE: 65 BPM | SYSTOLIC BLOOD PRESSURE: 161 MMHG

## 2019-04-20 LAB
ALBUMIN SERPL-MCNC: 3.8 G/DL (ref 3.4–5)
ALP SERPL-CCNC: 67 U/L (ref 40–150)
ALT SERPL W P-5'-P-CCNC: 45 U/L (ref 0–70)
ANION GAP SERPL CALCULATED.3IONS-SCNC: 7 MMOL/L (ref 3–14)
AST SERPL W P-5'-P-CCNC: 41 U/L (ref 0–45)
BASOPHILS # BLD AUTO: 0.1 10E9/L (ref 0–0.2)
BASOPHILS NFR BLD AUTO: 0.4 %
BILIRUB SERPL-MCNC: 0.3 MG/DL (ref 0.2–1.3)
BUN SERPL-MCNC: 26 MG/DL (ref 7–30)
CALCIUM SERPL-MCNC: 9.2 MG/DL (ref 8.5–10.1)
CHLORIDE SERPL-SCNC: 104 MMOL/L (ref 94–109)
CO2 SERPL-SCNC: 27 MMOL/L (ref 20–32)
CREAT SERPL-MCNC: 1.22 MG/DL (ref 0.66–1.25)
DIFFERENTIAL METHOD BLD: ABNORMAL
EOSINOPHIL # BLD AUTO: 0.2 10E9/L (ref 0–0.7)
EOSINOPHIL NFR BLD AUTO: 1.8 %
ERYTHROCYTE [DISTWIDTH] IN BLOOD BY AUTOMATED COUNT: 13 % (ref 10–15)
GFR SERPL CREATININE-BSD FRML MDRD: 60 ML/MIN/{1.73_M2}
GLUCOSE SERPL-MCNC: 145 MG/DL (ref 70–99)
HCT VFR BLD AUTO: 43.5 % (ref 40–53)
HGB BLD-MCNC: 15.2 G/DL (ref 13.3–17.7)
IMM GRANULOCYTES # BLD: 0.1 10E9/L (ref 0–0.4)
IMM GRANULOCYTES NFR BLD: 0.6 %
INR PPP: 1.14 (ref 0.86–1.14)
LYMPHOCYTES # BLD AUTO: 2.9 10E9/L (ref 0.8–5.3)
LYMPHOCYTES NFR BLD AUTO: 24.3 %
MCH RBC QN AUTO: 31.7 PG (ref 26.5–33)
MCHC RBC AUTO-ENTMCNC: 34.9 G/DL (ref 31.5–36.5)
MCV RBC AUTO: 91 FL (ref 78–100)
MONOCYTES # BLD AUTO: 0.9 10E9/L (ref 0–1.3)
MONOCYTES NFR BLD AUTO: 7.8 %
NEUTROPHILS # BLD AUTO: 7.8 10E9/L (ref 1.6–8.3)
NEUTROPHILS NFR BLD AUTO: 65.1 %
NRBC # BLD AUTO: 0 10*3/UL
NRBC BLD AUTO-RTO: 0 /100
PLATELET # BLD AUTO: 277 10E9/L (ref 150–450)
POTASSIUM SERPL-SCNC: 3.7 MMOL/L (ref 3.4–5.3)
PROT SERPL-MCNC: 7.9 G/DL (ref 6.8–8.8)
RBC # BLD AUTO: 4.79 10E12/L (ref 4.4–5.9)
SODIUM SERPL-SCNC: 138 MMOL/L (ref 133–144)
WBC # BLD AUTO: 12 10E9/L (ref 4–11)

## 2019-04-20 PROCEDURE — 80053 COMPREHEN METABOLIC PANEL: CPT | Performed by: EMERGENCY MEDICINE

## 2019-04-20 PROCEDURE — 85025 COMPLETE CBC W/AUTO DIFF WBC: CPT | Performed by: EMERGENCY MEDICINE

## 2019-04-20 PROCEDURE — 85610 PROTHROMBIN TIME: CPT | Performed by: EMERGENCY MEDICINE

## 2019-04-20 ASSESSMENT — ENCOUNTER SYMPTOMS
ABDOMINAL PAIN: 0
RECTAL PAIN: 0
DIARRHEA: 1
BLOOD IN STOOL: 1

## 2019-04-20 ASSESSMENT — MIFFLIN-ST. JEOR: SCORE: 2229.1

## 2019-04-20 NOTE — ED PROVIDER NOTES
"  History     Chief Complaint:    Rectal Bleeding      HPI   Mauricio Barrera is a 68 year old male with a history of hypertension, hyperlipidemia, s/o hemorrhoidectomy who presents to the ED for evaluation of rectal bleeding. The patient states that he generally felt well today. About 30 minutes prior to presenting, however, he states that he felt like he was going to have diarrhea. He went to have a bowel movement and noticed that the toilet bowl had bright red blood in it along with a seemingly normal bowel movement. He notes that he took six aspirin over the past two days, taking two tonight, for general aches and pains related to gardening/activity. He denies any pain with his bowel movement or abdominal pain.    Allergies:  The patient has no known drug allergies.     Medications:    Zocor  Telmisartan     Past Medical History:    Calculus of gallbladder  HLD  HTN  Incidental lung nodule  Lumbago    Past Surgical History:    Amputate foot x2  Amputate toes  Tonsillectomy  Hernia repair  Reduction torsion left testis  Hemorrhoidectomy  I&D foot  Repair hammer toe    Family History:    CAD  COPD    Social History:  Former smoker, quit 1/1/2004.  Rare alcohol use.   Marital Status:   [2]     Review of Systems   Gastrointestinal: Positive for blood in stool and diarrhea. Negative for abdominal pain and rectal pain.   All other systems reviewed and are negative.        Physical Exam   First Vitals:  BP: (!) 153/103  Pulse: 67  Temp: 98.3  F (36.8  C)  Resp: 16  Height: 195.6 cm (6' 5\")  Weight: 135.2 kg (298 lb)  SpO2: 94 %      Physical Exam  Constitutional: The patient is oriented to person, place, and time.   HENT:   Head: Atraumatic  Right Ear: Normal  Left Ear: Normal  Nose: Nose normal.   Mouth/Throat: Oropharynx is clear and moist. No erythema or exudate.   Eyes: Conjunctivae and EOM are normal. Pupils are equal, round, and reactive to light. No discharge  Neck: Normal range of motion. Neck supple. "   Cardiovascular: Normal rate, regular rhythm, no murmur gallops or rubs. Intact distal pulses.    Pulmonary/Chest: CTA bilaterally. No wheezes rale or rhonchi.  Abdominal: Soft. Non tender.  No masses   Musculoskeletal: No edema. No bony deformity. Normal range of motion  Lymphadenopathy:     The patient has no cervical adenopathy.   Neurological: The patient is alert and oriented to person, place, and time. The patient has normal strength and normal reflexes. No cranial nerve deficit. Coordination normal.   Skin: Skin is warm and dry. No rash noted. The patient is not diaphoretic.   Psychiatric: The patient has a normal mood and affect.  Rectal: Normal tone. No external hemorrhoid of fissure. Question internal hemorrhoid at the 5 o'clock position in the right lateral decubitus position. Small amount of bloody mucous within the rectal vault.     Emergency Department Course   Laboratory:  CBC: WBC: 12.0 (H), HGB: 15.2, PLT: 277  CMP: Glucose 145 (H), GFR estimate 60 (H), o/w WNL (Creatinine: 1.22)    INR: 1.14    Emergency Department Course:  Nursing notes and vitals reviewed. ([id number]) I performed an exam of the patient as documented above.     IV inserted. Blood drawn. This was sent to the lab for further testing, results above.     I rechecked the patient and discussed the results of his workup thus far.     Findings and plan explained to the Patient. Patient discharged home with instructions regarding supportive care, medications, and reasons to return. The importance of close follow-up was reviewed.     I personally reviewed the laboratory results with the Patient and answered all related questions prior to discharge.   Impression & Plan    Medical Decision Makin year old male presents after having an episode of bright red blood per rectum. He otherwise feels well. He does note he has been using Advil for the past few days for generalized achiness. On exam, he has no abdominal tenderness, he is not  tachycardic, he is not hypotensive. Laboratory examination is unremarkable with a hemoglobin of 15.2. On rectal exam he has a small amount of bloody mucous in the vault but no external fissures or hemorrhoids. Possible internal hemorrhoid at the 5 o'clock position. At this time I do not see signs of ongoing active bleeding. I feel he can be safely discharged to home. I have recommended follow up with [facility] certainly if he has continued bleeding or if he develops pain, shortness of breath, or lightheadedness he should return to the ED.       Diagnosis:    ICD-10-CM    1. Rectal bleeding K62.5        Disposition:  discharged to home     Scribe Disclosure:  I,  Karl Pierson, am serving as a scribe on 4/19/2019 at 11:57 PM to personally document services performed by Dago Martinez MD based on my observations and the provider's statements to me.          Karl Pierson  4/19/2019    EMERGENCY DEPARTMENT       Dago Martinez MD  04/20/19 0525

## 2019-04-25 ENCOUNTER — TRANSFERRED RECORDS (OUTPATIENT)
Dept: HEALTH INFORMATION MANAGEMENT | Facility: CLINIC | Age: 69
End: 2019-04-25

## 2019-05-08 ENCOUNTER — OFFICE VISIT (OUTPATIENT)
Dept: OPHTHALMOLOGY | Facility: CLINIC | Age: 69
End: 2019-05-08
Attending: OPHTHALMOLOGY
Payer: COMMERCIAL

## 2019-05-08 DIAGNOSIS — H43.811 PVD (POSTERIOR VITREOUS DETACHMENT), RIGHT EYE: Primary | ICD-10-CM

## 2019-05-08 DIAGNOSIS — H25.13 SENILE NUCLEAR SCLEROSIS, BILATERAL: ICD-10-CM

## 2019-05-08 PROCEDURE — G0463 HOSPITAL OUTPT CLINIC VISIT: HCPCS | Mod: ZF

## 2019-05-08 ASSESSMENT — VISUAL ACUITY
OS_CC+: +2
OD_CC+: -1
OD_CC: 20/25
CORRECTION_TYPE: GLASSES
METHOD: SNELLEN - LINEAR
OS_CC: 20/50
OS_PH_CC: 20/40

## 2019-05-08 ASSESSMENT — TONOMETRY
OS_IOP_MMHG: 14
OD_IOP_MMHG: 14
IOP_METHOD: TONOPEN

## 2019-05-08 ASSESSMENT — CUP TO DISC RATIO
OD_RATIO: 0.2
OS_RATIO: 0.2

## 2019-05-08 ASSESSMENT — REFRACTION_WEARINGRX
SPECS_TYPE: BIFOCAL
OD_AXIS: 037
OS_ADD: +2.75
OD_ADD: +2.75
OD_SPHERE: +0.75
OS_CYLINDER: SPHERE
OS_SPHERE: -1.75
OD_CYLINDER: +0.50

## 2019-05-08 ASSESSMENT — EXTERNAL EXAM - LEFT EYE: OS_EXAM: NORMAL

## 2019-05-08 ASSESSMENT — CONF VISUAL FIELD
METHOD: COUNTING FINGERS
OD_NORMAL: 1
OS_NORMAL: 1

## 2019-05-08 ASSESSMENT — SLIT LAMP EXAM - LIDS
COMMENTS: NORMAL
COMMENTS: NORMAL

## 2019-05-08 ASSESSMENT — EXTERNAL EXAM - RIGHT EYE: OD_EXAM: NORMAL

## 2019-05-08 NOTE — NURSING NOTE
"Chief Complaint(s) and History of Present Illness(es)     Annual Eye Exam     In both eyes.  Associated symptoms include redness.  Negative for flashes, floaters, dryness, eye pain and tearing.              Comments     Pt is here for his annual eye exam and f/u on Posterior vitreous detachment (PVD) right eye. Pt notes vision is \"stable\" each eye. Pt stated he just got new glasses back in [place] and they are working just fine. Pt does note some intermittent redness x last few months. Pt does no use any eye drops.      Mary Santana, [facility] 9:39 AM May 8, 2019                   "

## 2019-05-08 NOTE — PROGRESS NOTES
I have confirmed the patient's and reviewed Past Medical History, Past Surgical History, Social History, Family History, Problem List, Medication List and agree with Tech note.    CC: floaters right eye       HPI: floaters resolved, no flashes     Assessment/plan:   1.  Posterior vitreous detachment (PVD) right eye    - Informed patient    - Retinal detachment/retinal tear precautions discussed with patient  Contact clinic immediately for new floaters/flashers or shadows in vision     2.  Nuclear sclerotic cataract and Posterior subcapsular cataract (PSC)  Both eyes    - Not visually significant   - received Rx last visit   - monitor yearly     RTC 1 year with dilated fundus exam June 2020      Teddy Gifford MD PhD.  Professor & Chair

## 2019-05-20 ENCOUNTER — THERAPY VISIT (OUTPATIENT)
Dept: PHYSICAL THERAPY | Facility: CLINIC | Age: 69
End: 2019-05-20
Payer: COMMERCIAL

## 2019-05-20 DIAGNOSIS — M25.551 HIP PAIN, RIGHT: ICD-10-CM

## 2019-05-20 DIAGNOSIS — M70.61 TROCHANTERIC BURSITIS OF RIGHT HIP: ICD-10-CM

## 2019-05-20 PROCEDURE — 97161 PT EVAL LOW COMPLEX 20 MIN: CPT | Mod: GP | Performed by: PHYSICAL THERAPIST

## 2019-05-20 PROCEDURE — 97110 THERAPEUTIC EXERCISES: CPT | Mod: GP | Performed by: PHYSICAL THERAPIST

## 2019-05-20 ASSESSMENT — ACTIVITIES OF DAILY LIVING (ADL)
HEAVY_WORK: EXTREME DIFFICULTY
GETTING_INTO_AND_OUT_OF_A_BATHTUB: UNABLE TO DO
PUTTING_ON_SOCKS_AND_SHOES: NO DIFFICULTY AT ALL
WALKING_UP_STEEP_HILLS: EXTREME DIFFICULTY
GETTING_INTO_AND_OUT_OF_AN_AVERAGE_CAR: SLIGHT DIFFICULTY
TWISTING/PIVOTING_ON_INVOLVED_LEG: SLIGHT DIFFICULTY
WALKING_APPROXIMATELY_10_MINUTES: MODERATE DIFFICULTY
HOW_WOULD_YOU_RATE_YOUR_CURRENT_LEVEL_OF_FUNCTION_DURING_YOUR_USUAL_ACTIVITIES_OF_DAILY_LIVING_FROM_0_TO_100_WITH_100_BEING_YOUR_LEVEL_OF_FUNCTION_PRIOR_TO_YOUR_HIP_PROBLEM_AND_0_BEING_THE_INABILITY_TO_PERFORM_ANY_OF_YOUR_USUAL_DAILY_ACTIVITIES?: 60
GOING_DOWN_1_FLIGHT_OF_STAIRS: SLIGHT DIFFICULTY
DEEP_SQUATTING: UNABLE TO DO
STEPPING_UP_AND_DOWN_CURBS: MODERATE DIFFICULTY
WALKING_15_MINUTES_OR_GREATER: MODERATE DIFFICULTY
ROLLING_OVER_IN_BED: MODERATE DIFFICULTY
WALKING_DOWN_STEEP_HILLS: MODERATE DIFFICULTY
GOING_UP_1_FLIGHT_OF_STAIRS: EXTREME DIFFICULTY
STANDING_FOR_15_MINUTES: EXTREME DIFFICULTY
WALKING_INITIALLY: MODERATE DIFFICULTY
HOS_ADL_COUNT: 16
LIGHT_TO_MODERATE_WORK: SLIGHT DIFFICULTY
SITTING_FOR_15_MINUTES: NO DIFFICULTY AT ALL
HOS_ADL_HIGHEST_POTENTIAL_SCORE: 64
HOS_ADL_ITEM_SCORE_TOTAL: 28
HOS_ADL_SCORE(%): 43.75

## 2019-05-20 NOTE — PROGRESS NOTES
[facility] Initial Evaluation  Subjective:  Mauricio Barrera is a 68 year old male.    Patient s chief complaints: R lateral hip pain.    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  Date of Onset: around 12/1/18  Location of symptoms is R lateral hip and proximal ITB .  Symptoms other than pain include: limping  Quality of pain is aching and frequency is intermittent.    Pain dependence on time of day is: not dependent on time of day.   Pain rating is: 3-5/10.    Symptoms are exacerbated by: laying on R side, standing, walking, stairs, turning in bed, squatting.    Symptoms are relieved by:  aceteminophen.    Progression of symptoms is that symptoms are:  Improved post injection.  Imaging/Special tests include: x-ray negative   Previous treatments include: injection with some improvement (but didn't go away).   Patient reports that general health is: fair.   Pertinent medical history includes:  Neuropathy with to surgery (removal due to infections), calf pain, changes in bowel, HBP, overweight. .    Medical allergies includes: none.   Surgical history includes: toe surgery in past.  Current medications include: HBP medication  Current occupation is: retired  Work status is: retired  Primary job tasks include: none  Barriers include: none  Red flags include: none    Patient's expectations for therapy include: walking 2km, stairs.    HPI                    Objective:   HIP:    PROM:   L  R   Flexion 105 105   Extension     Abduction     IR 30 30   ER 45 45     Strength:   L R   HIP     Flex 5 5   Ext     Abd 5 5   KNEE     Flex 5 5   Ext 5 5     Special tests:   L R   Jimmie's     Chapo     MACK negatives negative   FADIR negative Trace pain lateral hip       Palpation: positive tender over greater trochanter on R, tender to palpation    System    Physical Exam    General     ROS    Assessment/Plan:    Patient  is a 68 year old male with right side knee complaints.    Patient has the following significant findings with corresponding treatment plan.                Diagnosis 1:  R hip, greater trochanteric bursitis    Pain -  hot/cold therapy, US, manual therapy and directional preference exercise  Decreased ROM/flexibility - manual therapy and therapeutic exercise  Decreased strength - therapeutic exercise and therapeutic activities  Decreased proprioception - neuro re-education and therapeutic activities  Impaired gait - gait training  Decreased function - therapeutic activities  Impaired posture - neuro re-education    Therapy Evaluation Codes:   1) History comprised of:   Personal factors that impact the plan of care:      None.    Comorbidity factors that impact the plan of care are:      None.     Medications impacting care: None.  2) Examination of Body Systems comprised of:   Body structures and functions that impact the plan of care:      Hip.   Activity limitations that impact the plan of care are:      Squatting/kneeling, Stairs, Standing, Walking and Laying down.  3) Clinical presentation characteristics are:   Stable/Uncomplicated.  4) Decision-Making    Low complexity using standardized patient assessment instrument and/or measureable assessment of functional outcome.  Cumulative Therapy Evaluation is: Low complexity.    Previous and current functional limitations:  (See Goal Flow Sheet for this information)    Short term and Long term goals: (See Goal Flow Sheet for this information)     Communication ability:  Patient appears to be able to clearly communicate and understand verbal and written communication and follow directions correctly.  Treatment Explanation - The following has been discussed with the patient:   RX ordered/plan of care  Anticipated outcomes  Possible risks and side effects  This patient would benefit from PT intervention to resume normal activities.   Rehab potential is good.    Frequency:   1 X week, once daily  Duration:  for 8 weeks  Discharge Plan:  Achieve all LTG.  Independent in home treatment program.  Reach maximal therapeutic benefit.    Please refer to the daily flowsheet for treatment today, total treatment time and time spent performing 1:1 timed codes.

## 2019-05-23 ENCOUNTER — TRANSFERRED RECORDS (OUTPATIENT)
Dept: HEALTH INFORMATION MANAGEMENT | Facility: CLINIC | Age: 69
End: 2019-05-23

## 2019-05-28 ENCOUNTER — THERAPY VISIT (OUTPATIENT)
Dept: PHYSICAL THERAPY | Facility: CLINIC | Age: 69
End: 2019-05-28
Payer: COMMERCIAL

## 2019-05-28 DIAGNOSIS — M25.551 HIP PAIN, RIGHT: ICD-10-CM

## 2019-05-28 PROCEDURE — 97110 THERAPEUTIC EXERCISES: CPT | Mod: GP | Performed by: PHYSICAL THERAPIST

## 2019-05-28 PROCEDURE — 97140 MANUAL THERAPY 1/> REGIONS: CPT | Mod: GP | Performed by: PHYSICAL THERAPIST

## 2019-06-05 ENCOUNTER — THERAPY VISIT (OUTPATIENT)
Dept: PHYSICAL THERAPY | Facility: CLINIC | Age: 69
End: 2019-06-05
Payer: COMMERCIAL

## 2019-06-05 DIAGNOSIS — M25.551 HIP PAIN, RIGHT: ICD-10-CM

## 2019-06-05 PROCEDURE — 97110 THERAPEUTIC EXERCISES: CPT | Mod: GP | Performed by: PHYSICAL THERAPIST

## 2019-06-05 PROCEDURE — 97140 MANUAL THERAPY 1/> REGIONS: CPT | Mod: GP | Performed by: PHYSICAL THERAPIST

## 2019-06-24 ENCOUNTER — TRANSFERRED RECORDS (OUTPATIENT)
Dept: HEALTH INFORMATION MANAGEMENT | Facility: CLINIC | Age: 69
End: 2019-06-24

## 2019-11-05 ENCOUNTER — HEALTH MAINTENANCE LETTER (OUTPATIENT)
Age: 69
End: 2019-11-05

## 2019-11-19 NOTE — NURSING NOTE
"Chief Complaint   Patient presents with     Eye Problem       Initial /66  Pulse 78  Temp 98.2  F (36.8  C) (Oral)  Wt (!) 306 lb (138.8 kg)  SpO2 95%  BMI 35.82 kg/m2 Estimated body mass index is 35.82 kg/(m^2) as calculated from the following:    Height as of 6/28/17: 6' 5.5\" (1.969 m).    Weight as of this encounter: 306 lb (138.8 kg).  Medication Reconciliation: complete   Bethany Ballard MA      " Pt will remain on eliquis, please see encounter for 11/14/19 for conversation

## 2020-01-01 ENCOUNTER — HEALTH MAINTENANCE LETTER (OUTPATIENT)
Age: 70
End: 2020-01-01

## 2020-01-01 ENCOUNTER — OFFICE VISIT (OUTPATIENT)
Dept: OPHTHALMOLOGY | Facility: CLINIC | Age: 70
End: 2020-01-01
Payer: COMMERCIAL

## 2020-01-01 ENCOUNTER — PRE VISIT (OUTPATIENT)
Dept: SURGERY | Facility: CLINIC | Age: 70
End: 2020-01-01

## 2020-01-01 ENCOUNTER — OFFICE VISIT (OUTPATIENT)
Dept: FAMILY MEDICINE | Facility: CLINIC | Age: 70
End: 2020-01-01
Payer: COMMERCIAL

## 2020-01-01 ENCOUNTER — OFFICE VISIT (OUTPATIENT)
Dept: OPHTHALMOLOGY | Facility: CLINIC | Age: 70
End: 2020-01-01
Attending: OPHTHALMOLOGY
Payer: COMMERCIAL

## 2020-01-01 ENCOUNTER — OFFICE VISIT (OUTPATIENT)
Dept: PODIATRY | Facility: CLINIC | Age: 70
End: 2020-01-01
Payer: COMMERCIAL

## 2020-01-01 ENCOUNTER — ANESTHESIA (OUTPATIENT)
Dept: SURGERY | Facility: CLINIC | Age: 70
DRG: 617 | End: 2020-01-01
Payer: COMMERCIAL

## 2020-01-01 ENCOUNTER — ANESTHESIA (OUTPATIENT)
Dept: SURGERY | Facility: AMBULATORY SURGERY CENTER | Age: 70
End: 2020-01-01

## 2020-01-01 ENCOUNTER — TELEPHONE (OUTPATIENT)
Dept: OPHTHALMOLOGY | Facility: CLINIC | Age: 70
End: 2020-01-01

## 2020-01-01 ENCOUNTER — TELEPHONE (OUTPATIENT)
Dept: FAMILY MEDICINE | Facility: CLINIC | Age: 70
End: 2020-01-01

## 2020-01-01 ENCOUNTER — APPOINTMENT (OUTPATIENT)
Dept: GENERAL RADIOLOGY | Facility: CLINIC | Age: 70
DRG: 617 | End: 2020-01-01
Attending: PODIATRIST
Payer: COMMERCIAL

## 2020-01-01 ENCOUNTER — APPOINTMENT (OUTPATIENT)
Dept: MRI IMAGING | Facility: CLINIC | Age: 70
DRG: 617 | End: 2020-01-01
Attending: PHYSICIAN ASSISTANT
Payer: COMMERCIAL

## 2020-01-01 ENCOUNTER — HOSPITAL ENCOUNTER (OUTPATIENT)
Facility: AMBULATORY SURGERY CENTER | Age: 70
Discharge: HOME | End: 2020-11-06
Attending: OPHTHALMOLOGY | Admitting: OPHTHALMOLOGY
Payer: COMMERCIAL

## 2020-01-01 ENCOUNTER — APPOINTMENT (OUTPATIENT)
Dept: PHYSICAL THERAPY | Facility: CLINIC | Age: 70
DRG: 617 | End: 2020-01-01
Attending: PODIATRIST
Payer: COMMERCIAL

## 2020-01-01 ENCOUNTER — TELEPHONE (OUTPATIENT)
Dept: PODIATRY | Facility: CLINIC | Age: 70
End: 2020-01-01

## 2020-01-01 ENCOUNTER — HOSPITAL ENCOUNTER (OUTPATIENT)
Facility: AMBULATORY SURGERY CENTER | Age: 70
Discharge: HOME | End: 2020-10-23
Attending: OPHTHALMOLOGY | Admitting: OPHTHALMOLOGY
Payer: COMMERCIAL

## 2020-01-01 ENCOUNTER — ANCILLARY PROCEDURE (OUTPATIENT)
Dept: GENERAL RADIOLOGY | Facility: CLINIC | Age: 70
End: 2020-01-01
Attending: PODIATRIST
Payer: COMMERCIAL

## 2020-01-01 ENCOUNTER — ANESTHESIA EVENT (OUTPATIENT)
Dept: SURGERY | Facility: CLINIC | Age: 70
DRG: 617 | End: 2020-01-01
Payer: COMMERCIAL

## 2020-01-01 ENCOUNTER — HOSPITAL ENCOUNTER (INPATIENT)
Facility: CLINIC | Age: 70
LOS: 3 days | Discharge: HOME | DRG: 617 | End: 2020-11-13
Attending: HOSPITALIST | Admitting: INTERNAL MEDICINE
Payer: COMMERCIAL

## 2020-01-01 ENCOUNTER — ANESTHESIA EVENT (OUTPATIENT)
Dept: SURGERY | Facility: AMBULATORY SURGERY CENTER | Age: 70
End: 2020-01-01

## 2020-01-01 VITALS
WEIGHT: 298.8 LBS | TEMPERATURE: 96.6 F | HEART RATE: 61 BPM | SYSTOLIC BLOOD PRESSURE: 154 MMHG | OXYGEN SATURATION: 94 % | RESPIRATION RATE: 16 BRPM | DIASTOLIC BLOOD PRESSURE: 95 MMHG | BODY MASS INDEX: 38.35 KG/M2 | HEIGHT: 74 IN

## 2020-01-01 VITALS
WEIGHT: 290 LBS | DIASTOLIC BLOOD PRESSURE: 81 MMHG | SYSTOLIC BLOOD PRESSURE: 120 MMHG | BODY MASS INDEX: 36.06 KG/M2 | HEIGHT: 75 IN

## 2020-01-01 VITALS
OXYGEN SATURATION: 97 % | HEIGHT: 75 IN | HEART RATE: 68 BPM | DIASTOLIC BLOOD PRESSURE: 68 MMHG | WEIGHT: 293 LBS | RESPIRATION RATE: 16 BRPM | SYSTOLIC BLOOD PRESSURE: 112 MMHG | TEMPERATURE: 98.7 F | BODY MASS INDEX: 36.43 KG/M2

## 2020-01-01 VITALS
RESPIRATION RATE: 16 BRPM | HEART RATE: 58 BPM | SYSTOLIC BLOOD PRESSURE: 138 MMHG | TEMPERATURE: 98.7 F | OXYGEN SATURATION: 96 % | WEIGHT: 293 LBS | HEIGHT: 75 IN | DIASTOLIC BLOOD PRESSURE: 87 MMHG | BODY MASS INDEX: 36.43 KG/M2

## 2020-01-01 VITALS
SYSTOLIC BLOOD PRESSURE: 115 MMHG | DIASTOLIC BLOOD PRESSURE: 70 MMHG | HEART RATE: 76 BPM | RESPIRATION RATE: 16 BRPM | WEIGHT: 293 LBS | OXYGEN SATURATION: 92 % | TEMPERATURE: 97.9 F | BODY MASS INDEX: 36.43 KG/M2 | HEIGHT: 75 IN

## 2020-01-01 VITALS
HEIGHT: 75 IN | BODY MASS INDEX: 37.05 KG/M2 | SYSTOLIC BLOOD PRESSURE: 126 MMHG | WEIGHT: 298 LBS | DIASTOLIC BLOOD PRESSURE: 64 MMHG

## 2020-01-01 VITALS
WEIGHT: 293 LBS | BODY MASS INDEX: 36.43 KG/M2 | DIASTOLIC BLOOD PRESSURE: 74 MMHG | SYSTOLIC BLOOD PRESSURE: 134 MMHG | HEIGHT: 75 IN

## 2020-01-01 DIAGNOSIS — H25.813 COMBINED FORM OF AGE-RELATED CATARACT, BOTH EYES: ICD-10-CM

## 2020-01-01 DIAGNOSIS — H52.203 MYOPIC ASTIGMATISM OF BOTH EYES: ICD-10-CM

## 2020-01-01 DIAGNOSIS — E78.5 HYPERLIPIDEMIA WITH TARGET LDL LESS THAN 130: ICD-10-CM

## 2020-01-01 DIAGNOSIS — G62.9 PERIPHERAL POLYNEUROPATHY: ICD-10-CM

## 2020-01-01 DIAGNOSIS — L97.512 ULCER OF BOTH FEET WITH FAT LAYER EXPOSED (H): ICD-10-CM

## 2020-01-01 DIAGNOSIS — H25.811 COMBINED FORM OF AGE-RELATED CATARACT, RIGHT EYE: ICD-10-CM

## 2020-01-01 DIAGNOSIS — H25.812 COMBINED FORM OF AGE-RELATED CATARACT, LEFT EYE: Primary | ICD-10-CM

## 2020-01-01 DIAGNOSIS — I10 HYPERTENSION GOAL BP (BLOOD PRESSURE) < 140/90: ICD-10-CM

## 2020-01-01 DIAGNOSIS — L03.119 CELLULITIS OF FOOT: ICD-10-CM

## 2020-01-01 DIAGNOSIS — M86.472 CHRONIC OSTEOMYELITIS OF LEFT FOOT WITH DRAINING SINUS (H): ICD-10-CM

## 2020-01-01 DIAGNOSIS — Z01.818 PREOP GENERAL PHYSICAL EXAM: Primary | ICD-10-CM

## 2020-01-01 DIAGNOSIS — Z98.890 POSTSURGICAL STATE, EYE: Primary | ICD-10-CM

## 2020-01-01 DIAGNOSIS — H43.813 POSTERIOR VITREOUS DETACHMENT, BILATERAL: ICD-10-CM

## 2020-01-01 DIAGNOSIS — E66.01 MORBID OBESITY (H): ICD-10-CM

## 2020-01-01 DIAGNOSIS — L97.522 ULCER OF BOTH FEET WITH FAT LAYER EXPOSED (H): ICD-10-CM

## 2020-01-01 DIAGNOSIS — Z89.422 S/P AMPUTATION OF LESSER TOE, LEFT (H): Primary | ICD-10-CM

## 2020-01-01 DIAGNOSIS — H25.813 COMBINED FORM OF AGE-RELATED CATARACT, BOTH EYES: Primary | ICD-10-CM

## 2020-01-01 DIAGNOSIS — I10 HYPERTENSION GOAL BP (BLOOD PRESSURE) < 140/90: Primary | ICD-10-CM

## 2020-01-01 DIAGNOSIS — H25.12 AGE-RELATED NUCLEAR CATARACT, LEFT: Primary | ICD-10-CM

## 2020-01-01 DIAGNOSIS — Z11.59 ENCOUNTER FOR SCREENING FOR OTHER VIRAL DISEASES: Primary | ICD-10-CM

## 2020-01-01 DIAGNOSIS — H25.11 NUCLEAR SENILE CATARACT OF RIGHT EYE: ICD-10-CM

## 2020-01-01 DIAGNOSIS — H52.13 MYOPIC ASTIGMATISM OF BOTH EYES: ICD-10-CM

## 2020-01-01 DIAGNOSIS — H25.811 COMBINED FORM OF AGE-RELATED CATARACT, RIGHT EYE: Primary | ICD-10-CM

## 2020-01-01 DIAGNOSIS — Z96.1 PSEUDOPHAKIA, BOTH EYES: Primary | ICD-10-CM

## 2020-01-01 DIAGNOSIS — H25.9 AGE-RELATED CATARACT OF BOTH EYES, UNSPECIFIED AGE-RELATED CATARACT TYPE: ICD-10-CM

## 2020-01-01 DIAGNOSIS — H52.4 PRESBYOPIA: ICD-10-CM

## 2020-01-01 DIAGNOSIS — Z11.59 ENCOUNTER FOR SCREENING FOR OTHER VIRAL DISEASES: ICD-10-CM

## 2020-01-01 DIAGNOSIS — M86.9 OSTEOMYELITIS OF TOE (H): ICD-10-CM

## 2020-01-01 DIAGNOSIS — R73.01 IMPAIRED FASTING GLUCOSE: ICD-10-CM

## 2020-01-01 LAB
ALBUMIN SERPL-MCNC: 3.4 G/DL (ref 3.4–5)
ALBUMIN SERPL-MCNC: 3.6 G/DL (ref 3.4–5)
ALP SERPL-CCNC: 51 U/L (ref 40–150)
ALP SERPL-CCNC: 59 U/L (ref 40–150)
ALT SERPL W P-5'-P-CCNC: 28 U/L (ref 0–70)
ALT SERPL W P-5'-P-CCNC: 32 U/L (ref 0–70)
ANION GAP SERPL CALCULATED.3IONS-SCNC: 5 MMOL/L (ref 3–14)
ANION GAP SERPL CALCULATED.3IONS-SCNC: 7 MMOL/L (ref 3–14)
ANION GAP SERPL CALCULATED.3IONS-SCNC: 7 MMOL/L (ref 3–14)
AST SERPL W P-5'-P-CCNC: 25 U/L (ref 0–45)
AST SERPL W P-5'-P-CCNC: 28 U/L (ref 0–45)
BACTERIA SPEC CULT: ABNORMAL
BACTERIA SPEC CULT: NO GROWTH
BACTERIA SPEC CULT: NO GROWTH
BILIRUB SERPL-MCNC: 0.4 MG/DL (ref 0.2–1.3)
BILIRUB SERPL-MCNC: 0.4 MG/DL (ref 0.2–1.3)
BUN SERPL-MCNC: 22 MG/DL (ref 7–30)
BUN SERPL-MCNC: 23 MG/DL (ref 7–30)
BUN SERPL-MCNC: 32 MG/DL (ref 7–30)
CALCIUM SERPL-MCNC: 8.4 MG/DL (ref 8.5–10.1)
CALCIUM SERPL-MCNC: 9.2 MG/DL (ref 8.5–10.1)
CALCIUM SERPL-MCNC: 9.5 MG/DL (ref 8.5–10.1)
CHLORIDE SERPL-SCNC: 104 MMOL/L (ref 94–109)
CHLORIDE SERPL-SCNC: 107 MMOL/L (ref 94–109)
CHLORIDE SERPL-SCNC: 107 MMOL/L (ref 94–109)
CHOLEST SERPL-MCNC: 162 MG/DL
CO2 SERPL-SCNC: 25 MMOL/L (ref 20–32)
CO2 SERPL-SCNC: 25 MMOL/L (ref 20–32)
CO2 SERPL-SCNC: 26 MMOL/L (ref 20–32)
COPATH REPORT: NORMAL
CREAT SERPL-MCNC: 0.82 MG/DL (ref 0.66–1.25)
CREAT SERPL-MCNC: 0.85 MG/DL (ref 0.66–1.25)
CREAT SERPL-MCNC: 0.86 MG/DL (ref 0.66–1.25)
CREAT UR-MCNC: 172 MG/DL
CRP SERPL-MCNC: 3.5 MG/L (ref 0–8)
ERYTHROCYTE [DISTWIDTH] IN BLOOD BY AUTOMATED COUNT: 12.8 % (ref 10–15)
ERYTHROCYTE [DISTWIDTH] IN BLOOD BY AUTOMATED COUNT: 12.9 % (ref 10–15)
ERYTHROCYTE [SEDIMENTATION RATE] IN BLOOD BY WESTERGREN METHOD: 12 MM/H (ref 0–20)
GFR SERPL CREATININE-BSD FRML MDRD: 87 ML/MIN/{1.73_M2}
GFR SERPL CREATININE-BSD FRML MDRD: 87 ML/MIN/{1.73_M2}
GFR SERPL CREATININE-BSD FRML MDRD: 88 ML/MIN/{1.73_M2}
GLUCOSE BLDC GLUCOMTR-MCNC: 101 MG/DL (ref 70–99)
GLUCOSE BLDC GLUCOMTR-MCNC: 102 MG/DL (ref 70–99)
GLUCOSE BLDC GLUCOMTR-MCNC: 104 MG/DL (ref 70–99)
GLUCOSE BLDC GLUCOMTR-MCNC: 104 MG/DL (ref 70–99)
GLUCOSE BLDC GLUCOMTR-MCNC: 109 MG/DL (ref 70–99)
GLUCOSE BLDC GLUCOMTR-MCNC: 109 MG/DL (ref 70–99)
GLUCOSE BLDC GLUCOMTR-MCNC: 110 MG/DL (ref 70–99)
GLUCOSE BLDC GLUCOMTR-MCNC: 113 MG/DL (ref 70–99)
GLUCOSE BLDC GLUCOMTR-MCNC: 119 MG/DL (ref 70–99)
GLUCOSE BLDC GLUCOMTR-MCNC: 138 MG/DL (ref 70–99)
GLUCOSE BLDC GLUCOMTR-MCNC: 151 MG/DL (ref 70–99)
GLUCOSE BLDC GLUCOMTR-MCNC: 154 MG/DL (ref 70–99)
GLUCOSE BLDC GLUCOMTR-MCNC: 98 MG/DL (ref 70–99)
GLUCOSE SERPL-MCNC: 104 MG/DL (ref 70–99)
GLUCOSE SERPL-MCNC: 108 MG/DL (ref 70–99)
GLUCOSE SERPL-MCNC: 116 MG/DL (ref 70–99)
GLUCOSE SERPL-MCNC: 119 MG/DL (ref 70–99)
GRAM STN SPEC: NORMAL
GRAM STN SPEC: NORMAL
HBA1C MFR BLD: 5.6 % (ref 0–5.6)
HCT VFR BLD AUTO: 40.7 % (ref 40–53)
HCT VFR BLD AUTO: 41.2 % (ref 40–53)
HDLC SERPL-MCNC: 38 MG/DL
HGB BLD-MCNC: 13.1 G/DL (ref 13.3–17.7)
HGB BLD-MCNC: 13.7 G/DL (ref 13.3–17.7)
INTERPRETATION ECG - MUSE: NORMAL
LABORATORY COMMENT REPORT: NORMAL
LDLC SERPL CALC-MCNC: 63 MG/DL
Lab: ABNORMAL
MCH RBC QN AUTO: 29.4 PG (ref 26.5–33)
MCH RBC QN AUTO: 30.4 PG (ref 26.5–33)
MCHC RBC AUTO-ENTMCNC: 32.2 G/DL (ref 31.5–36.5)
MCHC RBC AUTO-ENTMCNC: 33.3 G/DL (ref 31.5–36.5)
MCV RBC AUTO: 92 FL (ref 78–100)
MCV RBC AUTO: 92 FL (ref 78–100)
MICROALBUMIN UR-MCNC: 10 MG/L
MICROALBUMIN/CREAT UR: 6.05 MG/G CR (ref 0–17)
NONHDLC SERPL-MCNC: 124 MG/DL
PLATELET # BLD AUTO: 318 10E9/L (ref 150–450)
PLATELET # BLD AUTO: 330 10E9/L (ref 150–450)
POTASSIUM SERPL-SCNC: 3.5 MMOL/L (ref 3.4–5.3)
POTASSIUM SERPL-SCNC: 3.8 MMOL/L (ref 3.4–5.3)
POTASSIUM SERPL-SCNC: 3.8 MMOL/L (ref 3.4–5.3)
POTASSIUM SERPL-SCNC: 3.9 MMOL/L (ref 3.4–5.3)
PROT SERPL-MCNC: 7.6 G/DL (ref 6.8–8.8)
PROT SERPL-MCNC: 8.2 G/DL (ref 6.8–8.8)
RBC # BLD AUTO: 4.45 10E12/L (ref 4.4–5.9)
RBC # BLD AUTO: 4.5 10E12/L (ref 4.4–5.9)
SARS-COV-2 RNA SPEC QL NAA+PROBE: NEGATIVE
SARS-COV-2 RNA SPEC QL NAA+PROBE: NORMAL
SARS-COV-2 RNA SPEC QL NAA+PROBE: NOT DETECTED
SARS-COV-2 RNA SPEC QL NAA+PROBE: NOT DETECTED
SODIUM SERPL-SCNC: 136 MMOL/L (ref 133–144)
SODIUM SERPL-SCNC: 138 MMOL/L (ref 133–144)
SODIUM SERPL-SCNC: 139 MMOL/L (ref 133–144)
SPECIMEN SOURCE: ABNORMAL
SPECIMEN SOURCE: ABNORMAL
SPECIMEN SOURCE: NORMAL
TRIGL SERPL-MCNC: 306 MG/DL
WBC # BLD AUTO: 10.8 10E9/L (ref 4–11)
WBC # BLD AUTO: 8.8 10E9/L (ref 4–11)

## 2020-01-01 PROCEDURE — 80053 COMPREHEN METABOLIC PANEL: CPT | Performed by: PHYSICIAN ASSISTANT

## 2020-01-01 PROCEDURE — 82947 ASSAY GLUCOSE BLOOD QUANT: CPT | Performed by: ANESTHESIOLOGY

## 2020-01-01 PROCEDURE — 88300 SURGICAL PATH GROSS: CPT | Mod: TC | Performed by: PODIATRIST

## 2020-01-01 PROCEDURE — 99207 PR MOONLIGHTING INDICATOR: CPT | Performed by: INTERNAL MEDICINE

## 2020-01-01 PROCEDURE — 92014 COMPRE OPH EXAM EST PT 1/>: CPT | Performed by: OPHTHALMOLOGY

## 2020-01-01 PROCEDURE — 250N000011 HC RX IP 250 OP 636: Performed by: HOSPITALIST

## 2020-01-01 PROCEDURE — 999N001017 HC STATISTIC GLUCOSE BY METER IP

## 2020-01-01 PROCEDURE — 87040 BLOOD CULTURE FOR BACTERIA: CPT | Performed by: PHYSICIAN ASSISTANT

## 2020-01-01 PROCEDURE — 80053 COMPREHEN METABOLIC PANEL: CPT | Performed by: FAMILY MEDICINE

## 2020-01-01 PROCEDURE — 0Y6S0Z0 DETACHMENT AT LEFT 2ND TOE, COMPLETE, OPEN APPROACH: ICD-10-PCS | Performed by: PODIATRIST

## 2020-01-01 PROCEDURE — 250N000013 HC RX MED GY IP 250 OP 250 PS 637: Performed by: PHYSICIAN ASSISTANT

## 2020-01-01 PROCEDURE — 87186 SC STD MICRODIL/AGAR DIL: CPT | Performed by: PODIATRIST

## 2020-01-01 PROCEDURE — 87077 CULTURE AEROBIC IDENTIFY: CPT | Performed by: PODIATRIST

## 2020-01-01 PROCEDURE — A9585 GADOBUTROL INJECTION: HCPCS | Performed by: HOSPITALIST

## 2020-01-01 PROCEDURE — 120N000001 HC R&B MED SURG/OB

## 2020-01-01 PROCEDURE — 250N000011 HC RX IP 250 OP 636: Performed by: PODIATRIST

## 2020-01-01 PROCEDURE — 36415 COLL VENOUS BLD VENIPUNCTURE: CPT | Performed by: PHYSICIAN ASSISTANT

## 2020-01-01 PROCEDURE — 999N000063 XR TOE LT G/E 2 VW: Mod: LT

## 2020-01-01 PROCEDURE — 80061 LIPID PANEL: CPT | Performed by: FAMILY MEDICINE

## 2020-01-01 PROCEDURE — G0463 HOSPITAL OUTPT CLINIC VISIT: HCPCS

## 2020-01-01 PROCEDURE — 36415 COLL VENOUS BLD VENIPUNCTURE: CPT | Performed by: FAMILY MEDICINE

## 2020-01-01 PROCEDURE — 271N000001 HC OR GENERAL SUPPLY NON-STERILE: Performed by: PODIATRIST

## 2020-01-01 PROCEDURE — 93005 ELECTROCARDIOGRAM TRACING: CPT

## 2020-01-01 PROCEDURE — 97161 PT EVAL LOW COMPLEX 20 MIN: CPT | Mod: GP

## 2020-01-01 PROCEDURE — 73720 MRI LWR EXTREMITY W/O&W/DYE: CPT | Mod: 50

## 2020-01-01 PROCEDURE — 82043 UR ALBUMIN QUANTITATIVE: CPT | Performed by: FAMILY MEDICINE

## 2020-01-01 PROCEDURE — 97116 GAIT TRAINING THERAPY: CPT | Mod: GP

## 2020-01-01 PROCEDURE — 250N000011 HC RX IP 250 OP 636: Performed by: PHYSICIAN ASSISTANT

## 2020-01-01 PROCEDURE — 92015 DETERMINE REFRACTIVE STATE: CPT

## 2020-01-01 PROCEDURE — 66984 XCAPSL CTRC RMVL W/O ECP: CPT | Mod: RT

## 2020-01-01 PROCEDURE — 86140 C-REACTIVE PROTEIN: CPT | Performed by: PHYSICIAN ASSISTANT

## 2020-01-01 PROCEDURE — 99233 SBSQ HOSP IP/OBS HIGH 50: CPT | Performed by: INTERNAL MEDICINE

## 2020-01-01 PROCEDURE — 99214 OFFICE O/P EST MOD 30 MIN: CPT | Performed by: FAMILY MEDICINE

## 2020-01-01 PROCEDURE — 11042 DBRDMT SUBQ TIS 1ST 20SQCM/<: CPT | Performed by: PODIATRIST

## 2020-01-01 PROCEDURE — 87205 SMEAR GRAM STAIN: CPT | Performed by: PODIATRIST

## 2020-01-01 PROCEDURE — 99239 HOSP IP/OBS DSCHRG MGMT >30: CPT | Performed by: INTERNAL MEDICINE

## 2020-01-01 PROCEDURE — 66984 XCAPSL CTRC RMVL W/O ECP: CPT | Mod: LT

## 2020-01-01 PROCEDURE — 76519 ECHO EXAM OF EYE: CPT | Performed by: OPHTHALMOLOGY

## 2020-01-01 PROCEDURE — 370N000001 HC ANESTHESIA TECHNICAL FEE, 1ST 30 MIN: Performed by: PODIATRIST

## 2020-01-01 PROCEDURE — 272N000001 HC OR GENERAL SUPPLY STERILE: Performed by: PODIATRIST

## 2020-01-01 PROCEDURE — U0003 INFECTIOUS AGENT DETECTION BY NUCLEIC ACID (DNA OR RNA); SEVERE ACUTE RESPIRATORY SYNDROME CORONAVIRUS 2 (SARS-COV-2) (CORONAVIRUS DISEASE [COVID-19]), AMPLIFIED PROBE TECHNIQUE, MAKING USE OF HIGH THROUGHPUT TECHNOLOGIES AS DESCRIBED BY CMS-2020-01-R: HCPCS | Performed by: PHYSICIAN ASSISTANT

## 2020-01-01 PROCEDURE — 99214 OFFICE O/P EST MOD 30 MIN: CPT | Performed by: OPHTHALMOLOGY

## 2020-01-01 PROCEDURE — 73630 X-RAY EXAM OF FOOT: CPT | Mod: LT | Performed by: RADIOLOGY

## 2020-01-01 PROCEDURE — 99223 1ST HOSP IP/OBS HIGH 75: CPT | Mod: 57 | Performed by: PODIATRIST

## 2020-01-01 PROCEDURE — 360N000018 HC SURGERY LEVEL 2 W FLUORO 1ST 30 MIN: Performed by: PODIATRIST

## 2020-01-01 PROCEDURE — 370N000002 HC ANESTHESIA TECHNICAL FEE, EACH ADDTL 15 MIN: Performed by: PODIATRIST

## 2020-01-01 PROCEDURE — 250N000009 HC RX 250: Performed by: PODIATRIST

## 2020-01-01 PROCEDURE — 99205 OFFICE O/P NEW HI 60 MIN: CPT | Mod: 25 | Performed by: PODIATRIST

## 2020-01-01 PROCEDURE — 250N000011 HC RX IP 250 OP 636: Performed by: NURSE ANESTHETIST, CERTIFIED REGISTERED

## 2020-01-01 PROCEDURE — 761N000001 HC RECOVERY PHASE 1 LEVEL 1 FIRST HR: Performed by: PODIATRIST

## 2020-01-01 PROCEDURE — 87076 CULTURE ANAEROBE IDENT EACH: CPT | Performed by: PODIATRIST

## 2020-01-01 PROCEDURE — 85027 COMPLETE CBC AUTOMATED: CPT | Performed by: PHYSICIAN ASSISTANT

## 2020-01-01 PROCEDURE — 99024 POSTOP FOLLOW-UP VISIT: CPT | Performed by: OPHTHALMOLOGY

## 2020-01-01 PROCEDURE — 258N000003 HC RX IP 258 OP 636: Performed by: ANESTHESIOLOGY

## 2020-01-01 PROCEDURE — 258N000003 HC RX IP 258 OP 636: Performed by: HOSPITALIST

## 2020-01-01 PROCEDURE — 99024 POSTOP FOLLOW-UP VISIT: CPT | Mod: GC | Performed by: OPHTHALMOLOGY

## 2020-01-01 PROCEDURE — 999N000138 HC STATISTIC PRE-PROCEDURE ASSESSMENT I: Performed by: PODIATRIST

## 2020-01-01 PROCEDURE — 93010 ELECTROCARDIOGRAM REPORT: CPT | Performed by: INTERNAL MEDICINE

## 2020-01-01 PROCEDURE — 99231 SBSQ HOSP IP/OBS SF/LOW 25: CPT | Performed by: INTERNAL MEDICINE

## 2020-01-01 PROCEDURE — 85652 RBC SED RATE AUTOMATED: CPT | Performed by: PHYSICIAN ASSISTANT

## 2020-01-01 PROCEDURE — 360N000015 HC SURGERY LEVEL 2 EA 15 ADDTL MIN: Performed by: PODIATRIST

## 2020-01-01 PROCEDURE — 36415 COLL VENOUS BLD VENIPUNCTURE: CPT | Performed by: ANESTHESIOLOGY

## 2020-01-01 PROCEDURE — 28825 PARTIAL AMPUTATION OF TOE: CPT | Mod: T6 | Performed by: PODIATRIST

## 2020-01-01 PROCEDURE — 87070 CULTURE OTHR SPECIMN AEROBIC: CPT | Performed by: PODIATRIST

## 2020-01-01 PROCEDURE — U0003 INFECTIOUS AGENT DETECTION BY NUCLEIC ACID (DNA OR RNA); SEVERE ACUTE RESPIRATORY SYNDROME CORONAVIRUS 2 (SARS-COV-2) (CORONAVIRUS DISEASE [COVID-19]), AMPLIFIED PROBE TECHNIQUE, MAKING USE OF HIGH THROUGHPUT TECHNOLOGIES AS DESCRIBED BY CMS-2020-01-R: HCPCS | Performed by: OPHTHALMOLOGY

## 2020-01-01 PROCEDURE — 258N000003 HC RX IP 258 OP 636: Performed by: PHYSICIAN ASSISTANT

## 2020-01-01 PROCEDURE — 87075 CULTR BACTERIA EXCEPT BLOOD: CPT | Performed by: PODIATRIST

## 2020-01-01 PROCEDURE — 88300 SURGICAL PATH GROSS: CPT | Mod: 26 | Performed by: PATHOLOGY

## 2020-01-01 PROCEDURE — 99223 1ST HOSP IP/OBS HIGH 75: CPT | Mod: AI | Performed by: PHYSICIAN ASSISTANT

## 2020-01-01 PROCEDURE — 250N000013 HC RX MED GY IP 250 OP 250 PS 637: Performed by: PODIATRIST

## 2020-01-01 PROCEDURE — 99024 POSTOP FOLLOW-UP VISIT: CPT | Performed by: PODIATRIST

## 2020-01-01 PROCEDURE — 87176 TISSUE HOMOGENIZATION CULTR: CPT | Performed by: PODIATRIST

## 2020-01-01 PROCEDURE — 250N000009 HC RX 250: Performed by: NURSE ANESTHETIST, CERTIFIED REGISTERED

## 2020-01-01 PROCEDURE — 83036 HEMOGLOBIN GLYCOSYLATED A1C: CPT | Performed by: FAMILY MEDICINE

## 2020-01-01 PROCEDURE — 255N000002 HC RX 255 OP 636: Performed by: HOSPITALIST

## 2020-01-01 PROCEDURE — 84132 ASSAY OF SERUM POTASSIUM: CPT | Performed by: ANESTHESIOLOGY

## 2020-01-01 PROCEDURE — 80048 BASIC METABOLIC PNL TOTAL CA: CPT | Performed by: PHYSICIAN ASSISTANT

## 2020-01-01 DEVICE — EYE IMP IOL AMO PCL TECNIS ZCB00 18.5: Type: IMPLANTABLE DEVICE | Site: EYE | Status: FUNCTIONAL

## 2020-01-01 DEVICE — EYE IMP IOL AMO PCL TECNIS ZCB00 19.0: Type: IMPLANTABLE DEVICE | Site: EYE | Status: FUNCTIONAL

## 2020-01-01 RX ORDER — PRAZOSIN HYDROCHLORIDE 2 MG/1
2 CAPSULE ORAL 2 TIMES DAILY
COMMUNITY
Start: 2020-02-15 | End: 2020-01-01

## 2020-01-01 RX ORDER — FENTANYL CITRATE 50 UG/ML
INJECTION, SOLUTION INTRAMUSCULAR; INTRAVENOUS PRN
Status: DISCONTINUED | OUTPATIENT
Start: 2020-01-01 | End: 2020-01-01

## 2020-01-01 RX ORDER — OLMESARTAN MEDOXOMIL 20 MG/1
20 TABLET ORAL 2 TIMES DAILY
COMMUNITY
Start: 2020-02-15 | End: 2020-01-01

## 2020-01-01 RX ORDER — BUPIVACAINE HYDROCHLORIDE 5 MG/ML
INJECTION, SOLUTION EPIDURAL; INTRACAUDAL PRN
Status: DISCONTINUED | OUTPATIENT
Start: 2020-01-01 | End: 2020-01-01 | Stop reason: HOSPADM

## 2020-01-01 RX ORDER — HYDROCHLOROTHIAZIDE 12.5 MG/1
12.5 TABLET ORAL
Qty: 180 TABLET | Refills: 0 | Status: SHIPPED | OUTPATIENT
Start: 2020-01-01 | End: 2021-01-01

## 2020-01-01 RX ORDER — FENTANYL CITRATE 50 UG/ML
25-50 INJECTION, SOLUTION INTRAMUSCULAR; INTRAVENOUS
Status: DISCONTINUED | OUTPATIENT
Start: 2020-01-01 | End: 2020-01-01 | Stop reason: HOSPADM

## 2020-01-01 RX ORDER — OXYCODONE HYDROCHLORIDE 5 MG/1
5 TABLET ORAL EVERY 4 HOURS PRN
Status: DISCONTINUED | OUTPATIENT
Start: 2020-01-01 | End: 2020-01-01 | Stop reason: HOSPADM

## 2020-01-01 RX ORDER — TETRACAINE HYDROCHLORIDE 5 MG/ML
SOLUTION OPHTHALMIC PRN
Status: DISCONTINUED | OUTPATIENT
Start: 2020-01-01 | End: 2020-01-01 | Stop reason: HOSPADM

## 2020-01-01 RX ORDER — FENTANYL CITRATE 50 UG/ML
25-50 INJECTION, SOLUTION INTRAMUSCULAR; INTRAVENOUS EVERY 5 MIN PRN
Status: DISCONTINUED | OUTPATIENT
Start: 2020-01-01 | End: 2020-01-01 | Stop reason: HOSPADM

## 2020-01-01 RX ORDER — PREDNISOLONE ACETATE 10 MG/ML
1 SUSPENSION/ DROPS OPHTHALMIC DAILY
Status: DISCONTINUED | OUTPATIENT
Start: 2020-01-01 | End: 2020-01-01 | Stop reason: HOSPADM

## 2020-01-01 RX ORDER — LIDOCAINE HYDROCHLORIDE 10 MG/ML
INJECTION, SOLUTION EPIDURAL; INFILTRATION; INTRACAUDAL; PERINEURAL PRN
Status: DISCONTINUED | OUTPATIENT
Start: 2020-01-01 | End: 2020-01-01 | Stop reason: HOSPADM

## 2020-01-01 RX ORDER — PREDNISOLONE ACETATE 10 MG/ML
1 SUSPENSION/ DROPS OPHTHALMIC 3 TIMES DAILY
Status: DISCONTINUED | OUTPATIENT
Start: 2020-01-01 | End: 2020-01-01 | Stop reason: HOSPADM

## 2020-01-01 RX ORDER — SODIUM CHLORIDE, SODIUM LACTATE, POTASSIUM CHLORIDE, CALCIUM CHLORIDE 600; 310; 30; 20 MG/100ML; MG/100ML; MG/100ML; MG/100ML
INJECTION, SOLUTION INTRAVENOUS CONTINUOUS
Status: DISCONTINUED | OUTPATIENT
Start: 2020-01-01 | End: 2020-01-01 | Stop reason: HOSPADM

## 2020-01-01 RX ORDER — PRAZOSIN HYDROCHLORIDE 2 MG/1
2 CAPSULE ORAL 2 TIMES DAILY
Status: DISCONTINUED | OUTPATIENT
Start: 2020-01-01 | End: 2020-01-01 | Stop reason: HOSPADM

## 2020-01-01 RX ORDER — SIMVASTATIN 20 MG
20 TABLET ORAL AT BEDTIME
Status: DISCONTINUED | OUTPATIENT
Start: 2020-01-01 | End: 2020-01-01 | Stop reason: HOSPADM

## 2020-01-01 RX ORDER — DIMENHYDRINATE 50 MG/ML
12.5 INJECTION, SOLUTION INTRAMUSCULAR; INTRAVENOUS
Status: DISCONTINUED | OUTPATIENT
Start: 2020-01-01 | End: 2020-01-01 | Stop reason: HOSPADM

## 2020-01-01 RX ORDER — POLYETHYLENE GLYCOL 3350 17 G/17G
17 POWDER, FOR SOLUTION ORAL DAILY PRN
Status: DISCONTINUED | OUTPATIENT
Start: 2020-01-01 | End: 2020-01-01 | Stop reason: HOSPADM

## 2020-01-01 RX ORDER — DICLOFENAC SODIUM 1 MG/ML
1 SOLUTION/ DROPS OPHTHALMIC
Status: COMPLETED | OUTPATIENT
Start: 2020-01-01 | End: 2020-01-01

## 2020-01-01 RX ORDER — OFLOXACIN 3 MG/ML
SOLUTION/ DROPS OPHTHALMIC
Qty: 1 BOTTLE | Refills: 0 | Status: ON HOLD | OUTPATIENT
Start: 2020-01-01 | End: 2020-01-01

## 2020-01-01 RX ORDER — NICOTINE POLACRILEX 4 MG
15-30 LOZENGE BUCCAL
Status: DISCONTINUED | OUTPATIENT
Start: 2020-01-01 | End: 2020-01-01 | Stop reason: HOSPADM

## 2020-01-01 RX ORDER — ALBUTEROL SULFATE 0.83 MG/ML
2.5 SOLUTION RESPIRATORY (INHALATION)
Status: DISCONTINUED | OUTPATIENT
Start: 2020-01-01 | End: 2020-01-01 | Stop reason: HOSPADM

## 2020-01-01 RX ORDER — PREDNISOLONE ACETATE 10 MG/ML
SUSPENSION/ DROPS OPHTHALMIC
Qty: 1 BOTTLE | Refills: 1 | Status: SHIPPED | OUTPATIENT
Start: 2020-01-01

## 2020-01-01 RX ORDER — DEXTROSE MONOHYDRATE 25 G/50ML
25-50 INJECTION, SOLUTION INTRAVENOUS
Status: DISCONTINUED | OUTPATIENT
Start: 2020-01-01 | End: 2020-01-01 | Stop reason: HOSPADM

## 2020-01-01 RX ORDER — SODIUM CHLORIDE, SODIUM LACTATE, POTASSIUM CHLORIDE, CALCIUM CHLORIDE 600; 310; 30; 20 MG/100ML; MG/100ML; MG/100ML; MG/100ML
500 INJECTION, SOLUTION INTRAVENOUS CONTINUOUS
Status: DISCONTINUED | OUTPATIENT
Start: 2020-01-01 | End: 2020-01-01 | Stop reason: HOSPADM

## 2020-01-01 RX ORDER — LEVOFLOXACIN 750 MG/1
750 TABLET, FILM COATED ORAL DAILY
Qty: 10 TABLET | Refills: 0 | Status: ON HOLD | OUTPATIENT
Start: 2020-01-01 | End: 2020-01-01

## 2020-01-01 RX ORDER — CYCLOPENTOLAT/TROPIC/PHENYLEPH 1%-1%-2.5%
1 DROPS (EA) OPHTHALMIC (EYE)
Status: COMPLETED | OUTPATIENT
Start: 2020-01-01 | End: 2020-01-01

## 2020-01-01 RX ORDER — PROPOFOL 10 MG/ML
INJECTION, EMULSION INTRAVENOUS PRN
Status: DISCONTINUED | OUTPATIENT
Start: 2020-01-01 | End: 2020-01-01

## 2020-01-01 RX ORDER — LIDOCAINE 40 MG/G
CREAM TOPICAL
Status: DISCONTINUED | OUTPATIENT
Start: 2020-01-01 | End: 2020-01-01 | Stop reason: HOSPADM

## 2020-01-01 RX ORDER — OFLOXACIN 3 MG/ML
1 SOLUTION/ DROPS OPHTHALMIC 4 TIMES DAILY
Status: DISCONTINUED | OUTPATIENT
Start: 2020-01-01 | End: 2020-01-01 | Stop reason: HOSPADM

## 2020-01-01 RX ORDER — MEPERIDINE HYDROCHLORIDE 25 MG/ML
12.5 INJECTION INTRAMUSCULAR; INTRAVENOUS; SUBCUTANEOUS
Status: DISCONTINUED | OUTPATIENT
Start: 2020-01-01 | End: 2020-01-01 | Stop reason: HOSPADM

## 2020-01-01 RX ORDER — ONDANSETRON 4 MG/1
4 TABLET, ORALLY DISINTEGRATING ORAL EVERY 6 HOURS PRN
Status: DISCONTINUED | OUTPATIENT
Start: 2020-01-01 | End: 2020-01-01 | Stop reason: HOSPADM

## 2020-01-01 RX ORDER — NALOXONE HYDROCHLORIDE 0.4 MG/ML
.1-.4 INJECTION, SOLUTION INTRAMUSCULAR; INTRAVENOUS; SUBCUTANEOUS
Status: DISCONTINUED | OUTPATIENT
Start: 2020-01-01 | End: 2020-01-01 | Stop reason: HOSPADM

## 2020-01-01 RX ORDER — PRAZOSIN HYDROCHLORIDE 5 MG/1
5 CAPSULE ORAL 2 TIMES DAILY
Qty: 90 CAPSULE | Refills: 0 | Status: SHIPPED | OUTPATIENT
Start: 2021-01-01 | End: 2021-01-01

## 2020-01-01 RX ORDER — DEXAMETHASONE SODIUM PHOSPHATE 4 MG/ML
4 INJECTION, SOLUTION INTRA-ARTICULAR; INTRALESIONAL; INTRAMUSCULAR; INTRAVENOUS; SOFT TISSUE EVERY 10 MIN PRN
Status: DISCONTINUED | OUTPATIENT
Start: 2020-01-01 | End: 2020-01-01 | Stop reason: HOSPADM

## 2020-01-01 RX ORDER — AMOXICILLIN 250 MG
2 CAPSULE ORAL 2 TIMES DAILY PRN
Status: DISCONTINUED | OUTPATIENT
Start: 2020-01-01 | End: 2020-01-01 | Stop reason: HOSPADM

## 2020-01-01 RX ORDER — ONDANSETRON 2 MG/ML
4 INJECTION INTRAMUSCULAR; INTRAVENOUS EVERY 6 HOURS PRN
Status: DISCONTINUED | OUTPATIENT
Start: 2020-01-01 | End: 2020-01-01 | Stop reason: HOSPADM

## 2020-01-01 RX ORDER — PROPARACAINE HYDROCHLORIDE 5 MG/ML
1 SOLUTION/ DROPS OPHTHALMIC ONCE
Status: COMPLETED | OUTPATIENT
Start: 2020-01-01 | End: 2020-01-01

## 2020-01-01 RX ORDER — LIDOCAINE HYDROCHLORIDE 20 MG/ML
INJECTION, SOLUTION INFILTRATION; PERINEURAL PRN
Status: DISCONTINUED | OUTPATIENT
Start: 2020-01-01 | End: 2020-01-01

## 2020-01-01 RX ORDER — ONDANSETRON 2 MG/ML
INJECTION INTRAMUSCULAR; INTRAVENOUS PRN
Status: DISCONTINUED | OUTPATIENT
Start: 2020-01-01 | End: 2020-01-01

## 2020-01-01 RX ORDER — AMLODIPINE BESYLATE 5 MG/1
5 TABLET ORAL 2 TIMES DAILY
Qty: 180 TABLET | Refills: 0 | Status: SHIPPED | OUTPATIENT
Start: 2021-01-01 | End: 2021-01-01

## 2020-01-01 RX ORDER — ONDANSETRON 4 MG/1
4 TABLET, ORALLY DISINTEGRATING ORAL EVERY 30 MIN PRN
Status: DISCONTINUED | OUTPATIENT
Start: 2020-01-01 | End: 2020-01-01 | Stop reason: HOSPADM

## 2020-01-01 RX ORDER — HYDROMORPHONE HYDROCHLORIDE 1 MG/ML
.3-.5 INJECTION, SOLUTION INTRAMUSCULAR; INTRAVENOUS; SUBCUTANEOUS EVERY 10 MIN PRN
Status: DISCONTINUED | OUTPATIENT
Start: 2020-01-01 | End: 2020-01-01 | Stop reason: HOSPADM

## 2020-01-01 RX ORDER — PROCHLORPERAZINE MALEATE 5 MG
5 TABLET ORAL EVERY 6 HOURS PRN
Status: DISCONTINUED | OUTPATIENT
Start: 2020-01-01 | End: 2020-01-01 | Stop reason: HOSPADM

## 2020-01-01 RX ORDER — ACETAMINOPHEN 325 MG/1
650 TABLET ORAL EVERY 4 HOURS PRN
Status: DISCONTINUED | OUTPATIENT
Start: 2020-01-01 | End: 2020-01-01

## 2020-01-01 RX ORDER — MOXIFLOXACIN IN NACL,ISO-OS/PF 0.3MG/0.3
SYRINGE (ML) INTRAOCULAR PRN
Status: DISCONTINUED | OUTPATIENT
Start: 2020-01-01 | End: 2020-01-01 | Stop reason: HOSPADM

## 2020-01-01 RX ORDER — ONDANSETRON 2 MG/ML
4 INJECTION INTRAMUSCULAR; INTRAVENOUS EVERY 30 MIN PRN
Status: DISCONTINUED | OUTPATIENT
Start: 2020-01-01 | End: 2020-01-01 | Stop reason: HOSPADM

## 2020-01-01 RX ORDER — GADOBUTROL 604.72 MG/ML
13 INJECTION INTRAVENOUS ONCE
Status: COMPLETED | OUTPATIENT
Start: 2020-01-01 | End: 2020-01-01

## 2020-01-01 RX ORDER — OFLOXACIN 3 MG/ML
1 SOLUTION/ DROPS OPHTHALMIC
Status: COMPLETED | OUTPATIENT
Start: 2020-01-01 | End: 2020-01-01

## 2020-01-01 RX ORDER — ACETAMINOPHEN 650 MG/1
650 SUPPOSITORY RECTAL EVERY 4 HOURS PRN
Status: DISCONTINUED | OUTPATIENT
Start: 2020-01-01 | End: 2020-01-01 | Stop reason: HOSPADM

## 2020-01-01 RX ORDER — PREDNISOLONE ACETATE 10 MG/ML
1 SUSPENSION/ DROPS OPHTHALMIC 4 TIMES DAILY
Status: DISCONTINUED | OUTPATIENT
Start: 2020-01-01 | End: 2020-01-01 | Stop reason: HOSPADM

## 2020-01-01 RX ORDER — HYDROMORPHONE HYDROCHLORIDE 1 MG/ML
0.2 INJECTION, SOLUTION INTRAMUSCULAR; INTRAVENOUS; SUBCUTANEOUS
Status: DISCONTINUED | OUTPATIENT
Start: 2020-01-01 | End: 2020-01-01 | Stop reason: HOSPADM

## 2020-01-01 RX ORDER — PIPERACILLIN SODIUM, TAZOBACTAM SODIUM 3; .375 G/15ML; G/15ML
3.38 INJECTION, POWDER, LYOPHILIZED, FOR SOLUTION INTRAVENOUS EVERY 6 HOURS
Status: DISCONTINUED | OUTPATIENT
Start: 2020-01-01 | End: 2020-01-01 | Stop reason: HOSPADM

## 2020-01-01 RX ORDER — CALCIUM CARBONATE 500 MG/1
1000 TABLET, CHEWABLE ORAL 4 TIMES DAILY PRN
Status: DISCONTINUED | OUTPATIENT
Start: 2020-01-01 | End: 2020-01-01 | Stop reason: HOSPADM

## 2020-01-01 RX ORDER — PREDNISOLONE ACETATE 10 MG/ML
1 SUSPENSION/ DROPS OPHTHALMIC 2 TIMES DAILY
Status: DISCONTINUED | OUTPATIENT
Start: 2020-01-01 | End: 2020-01-01 | Stop reason: HOSPADM

## 2020-01-01 RX ORDER — AMLODIPINE BESYLATE 5 MG/1
5 TABLET ORAL DAILY
COMMUNITY
Start: 2020-01-01 | End: 2020-01-01

## 2020-01-01 RX ORDER — HYDROCHLOROTHIAZIDE 12.5 MG/1
12.5 TABLET ORAL DAILY
COMMUNITY
Start: 2020-01-01 | End: 2020-01-01

## 2020-01-01 RX ORDER — OLMESARTAN MEDOXOMIL 20 MG/1
20 TABLET ORAL 2 TIMES DAILY
Qty: 180 TABLET | Refills: 0 | Status: SHIPPED | OUTPATIENT
Start: 2021-01-01 | End: 2021-01-01

## 2020-01-01 RX ORDER — PREDNISOLONE ACETATE 10 MG/ML
1 SUSPENSION/ DROPS OPHTHALMIC 4 TIMES DAILY
Status: DISCONTINUED | OUTPATIENT
Start: 2020-01-01 | End: 2020-01-01

## 2020-01-01 RX ORDER — OXYCODONE HYDROCHLORIDE 5 MG/1
5-10 TABLET ORAL EVERY 4 HOURS PRN
Status: DISCONTINUED | OUTPATIENT
Start: 2020-01-01 | End: 2020-01-01 | Stop reason: HOSPADM

## 2020-01-01 RX ORDER — SODIUM CHLORIDE 9 MG/ML
INJECTION, SOLUTION INTRAVENOUS CONTINUOUS
Status: DISCONTINUED | OUTPATIENT
Start: 2020-01-01 | End: 2020-01-01

## 2020-01-01 RX ORDER — PRAZOSIN HYDROCHLORIDE 5 MG/1
5 CAPSULE ORAL AT BEDTIME
COMMUNITY
Start: 2020-01-01 | End: 2021-01-01

## 2020-01-01 RX ORDER — BALANCED SALT SOLUTION 6.4; .75; .48; .3; 3.9; 1.7 MG/ML; MG/ML; MG/ML; MG/ML; MG/ML; MG/ML
SOLUTION OPHTHALMIC PRN
Status: DISCONTINUED | OUTPATIENT
Start: 2020-01-01 | End: 2020-01-01 | Stop reason: HOSPADM

## 2020-01-01 RX ORDER — PROPOFOL 10 MG/ML
INJECTION, EMULSION INTRAVENOUS CONTINUOUS PRN
Status: DISCONTINUED | OUTPATIENT
Start: 2020-01-01 | End: 2020-01-01

## 2020-01-01 RX ORDER — OLMESARTAN MEDOXOMIL 20 MG/1
TABLET ORAL DAILY
COMMUNITY
Start: 2020-01-01 | End: 2021-01-01

## 2020-01-01 RX ORDER — AMOXICILLIN 250 MG
1 CAPSULE ORAL 2 TIMES DAILY PRN
Status: DISCONTINUED | OUTPATIENT
Start: 2020-01-01 | End: 2020-01-01 | Stop reason: HOSPADM

## 2020-01-01 RX ORDER — OXYCODONE HYDROCHLORIDE 5 MG/1
5-10 TABLET ORAL EVERY 4 HOURS PRN
Status: DISCONTINUED | OUTPATIENT
Start: 2020-01-01 | End: 2020-01-01

## 2020-01-01 RX ORDER — OFLOXACIN 3 MG/ML
SOLUTION/ DROPS OPHTHALMIC
Qty: 1 BOTTLE | Refills: 0 | Status: SHIPPED | OUTPATIENT
Start: 2020-01-01 | End: 2020-01-01

## 2020-01-01 RX ORDER — EPHEDRINE SULFATE 50 MG/ML
INJECTION, SOLUTION INTRAMUSCULAR; INTRAVENOUS; SUBCUTANEOUS PRN
Status: DISCONTINUED | OUTPATIENT
Start: 2020-01-01 | End: 2020-01-01

## 2020-01-01 RX ORDER — ACETAMINOPHEN 325 MG/1
975 TABLET ORAL EVERY 8 HOURS
Status: DISCONTINUED | OUTPATIENT
Start: 2020-01-01 | End: 2020-01-01 | Stop reason: HOSPADM

## 2020-01-01 RX ORDER — HYDROCODONE BITARTRATE AND ACETAMINOPHEN 5; 325 MG/1; MG/1
TABLET ORAL
Qty: 10 TABLET | Refills: 0 | Status: SHIPPED | OUTPATIENT
Start: 2020-01-01

## 2020-01-01 RX ORDER — LIDOCAINE 40 MG/G
CREAM TOPICAL
Status: DISCONTINUED | OUTPATIENT
Start: 2020-01-01 | End: 2020-01-01

## 2020-01-01 RX ORDER — ACETAMINOPHEN 325 MG/1
650 TABLET ORAL EVERY 4 HOURS PRN
Status: DISCONTINUED | OUTPATIENT
Start: 2020-01-01 | End: 2020-01-01 | Stop reason: HOSPADM

## 2020-01-01 RX ORDER — PROCHLORPERAZINE 25 MG
12.5 SUPPOSITORY, RECTAL RECTAL EVERY 12 HOURS PRN
Status: DISCONTINUED | OUTPATIENT
Start: 2020-01-01 | End: 2020-01-01 | Stop reason: HOSPADM

## 2020-01-01 RX ORDER — NALOXONE HYDROCHLORIDE 0.4 MG/ML
.1-.4 INJECTION, SOLUTION INTRAMUSCULAR; INTRAVENOUS; SUBCUTANEOUS
Status: DISCONTINUED | OUTPATIENT
Start: 2020-01-01 | End: 2020-01-01

## 2020-01-01 RX ADMIN — PREDNISOLONE ACETATE 1 DROP: 10 SUSPENSION/ DROPS OPHTHALMIC at 08:35

## 2020-01-01 RX ADMIN — ONDANSETRON 4 MG: 2 INJECTION INTRAMUSCULAR; INTRAVENOUS at 09:14

## 2020-01-01 RX ADMIN — PIPERACILLIN SODIUM AND TAZOBACTAM SODIUM 3.38 G: 3; .375 INJECTION, POWDER, LYOPHILIZED, FOR SOLUTION INTRAVENOUS at 09:55

## 2020-01-01 RX ADMIN — OFLOXACIN 1 DROP: 3 SOLUTION/ DROPS OPHTHALMIC at 21:28

## 2020-01-01 RX ADMIN — OFLOXACIN 1 DROP: 3 SOLUTION/ DROPS OPHTHALMIC at 18:53

## 2020-01-01 RX ADMIN — DICLOFENAC SODIUM 1 DROP: 1 SOLUTION/ DROPS OPHTHALMIC at 08:11

## 2020-01-01 RX ADMIN — PREDNISOLONE ACETATE 1 DROP: 10 SUSPENSION/ DROPS OPHTHALMIC at 19:09

## 2020-01-01 RX ADMIN — PRAZOSIN HYDROCHLORIDE 2 MG: 2 CAPSULE ORAL at 21:37

## 2020-01-01 RX ADMIN — LOSARTAN POTASSIUM 12.5 MG: 25 TABLET, FILM COATED ORAL at 09:52

## 2020-01-01 RX ADMIN — FENTANYL CITRATE 50 MCG: 50 INJECTION, SOLUTION INTRAMUSCULAR; INTRAVENOUS at 09:14

## 2020-01-01 RX ADMIN — SIMVASTATIN 20 MG: 20 TABLET, FILM COATED ORAL at 21:28

## 2020-01-01 RX ADMIN — PRAZOSIN HYDROCHLORIDE 2 MG: 2 CAPSULE ORAL at 21:27

## 2020-01-01 RX ADMIN — PRAZOSIN HYDROCHLORIDE 2 MG: 2 CAPSULE ORAL at 08:19

## 2020-01-01 RX ADMIN — PREDNISOLONE ACETATE 1 DROP: 10 SUSPENSION/ DROPS OPHTHALMIC at 21:28

## 2020-01-01 RX ADMIN — DICLOFENAC SODIUM 1 DROP: 1 SOLUTION/ DROPS OPHTHALMIC at 09:39

## 2020-01-01 RX ADMIN — Medication 1 DROP: at 08:11

## 2020-01-01 RX ADMIN — PROPOFOL 30 MG: 10 INJECTION, EMULSION INTRAVENOUS at 07:33

## 2020-01-01 RX ADMIN — PREDNISOLONE ACETATE 1 DROP: 10 SUSPENSION/ DROPS OPHTHALMIC at 14:06

## 2020-01-01 RX ADMIN — PRAZOSIN HYDROCHLORIDE 2 MG: 2 CAPSULE ORAL at 09:52

## 2020-01-01 RX ADMIN — PRAZOSIN HYDROCHLORIDE 2 MG: 2 CAPSULE ORAL at 12:37

## 2020-01-01 RX ADMIN — PREDNISOLONE ACETATE 1 DROP: 10 SUSPENSION/ DROPS OPHTHALMIC at 12:46

## 2020-01-01 RX ADMIN — Medication 1 DROP: at 09:46

## 2020-01-01 RX ADMIN — PIPERACILLIN SODIUM AND TAZOBACTAM SODIUM 3.38 G: 3; .375 INJECTION, POWDER, LYOPHILIZED, FOR SOLUTION INTRAVENOUS at 21:28

## 2020-01-01 RX ADMIN — PROPOFOL 20 MG: 10 INJECTION, EMULSION INTRAVENOUS at 07:46

## 2020-01-01 RX ADMIN — PROPOFOL 20 MG: 10 INJECTION, EMULSION INTRAVENOUS at 07:52

## 2020-01-01 RX ADMIN — PREDNISOLONE ACETATE 1 DROP: 10 SUSPENSION/ DROPS OPHTHALMIC at 18:53

## 2020-01-01 RX ADMIN — PROPARACAINE HYDROCHLORIDE 1 DROP: 5 SOLUTION/ DROPS OPHTHALMIC at 09:38

## 2020-01-01 RX ADMIN — Medication 1 DROP: at 08:20

## 2020-01-01 RX ADMIN — OFLOXACIN 1 DROP: 3 SOLUTION/ DROPS OPHTHALMIC at 08:19

## 2020-01-01 RX ADMIN — PIPERACILLIN SODIUM AND TAZOBACTAM SODIUM 3.38 G: 3; .375 INJECTION, POWDER, LYOPHILIZED, FOR SOLUTION INTRAVENOUS at 21:56

## 2020-01-01 RX ADMIN — DICLOFENAC SODIUM 1 DROP: 1 SOLUTION/ DROPS OPHTHALMIC at 08:31

## 2020-01-01 RX ADMIN — PIPERACILLIN SODIUM AND TAZOBACTAM SODIUM 3.38 G: 3; .375 INJECTION, POWDER, LYOPHILIZED, FOR SOLUTION INTRAVENOUS at 17:04

## 2020-01-01 RX ADMIN — PIPERACILLIN SODIUM AND TAZOBACTAM SODIUM 3.38 G: 3; .375 INJECTION, POWDER, LYOPHILIZED, FOR SOLUTION INTRAVENOUS at 10:51

## 2020-01-01 RX ADMIN — PREDNISOLONE ACETATE 1 DROP: 10 SUSPENSION/ DROPS OPHTHALMIC at 15:43

## 2020-01-01 RX ADMIN — PROPARACAINE HYDROCHLORIDE 1 DROP: 5 SOLUTION/ DROPS OPHTHALMIC at 08:11

## 2020-01-01 RX ADMIN — LOSARTAN POTASSIUM 12.5 MG: 25 TABLET, FILM COATED ORAL at 08:20

## 2020-01-01 RX ADMIN — OFLOXACIN 1 DROP: 3 SOLUTION/ DROPS OPHTHALMIC at 08:11

## 2020-01-01 RX ADMIN — PIPERACILLIN SODIUM AND TAZOBACTAM SODIUM 3.38 G: 3; .375 INJECTION, POWDER, LYOPHILIZED, FOR SOLUTION INTRAVENOUS at 12:36

## 2020-01-01 RX ADMIN — OFLOXACIN 1 DROP: 3 SOLUTION/ DROPS OPHTHALMIC at 09:46

## 2020-01-01 RX ADMIN — OFLOXACIN 1 DROP: 3 SOLUTION/ DROPS OPHTHALMIC at 19:09

## 2020-01-01 RX ADMIN — SIMVASTATIN 20 MG: 20 TABLET, FILM COATED ORAL at 21:37

## 2020-01-01 RX ADMIN — VANCOMYCIN HYDROCHLORIDE 2000 MG: 5 INJECTION, POWDER, LYOPHILIZED, FOR SOLUTION INTRAVENOUS at 18:00

## 2020-01-01 RX ADMIN — Medication 1 DROP: at 09:55

## 2020-01-01 RX ADMIN — PIPERACILLIN SODIUM AND TAZOBACTAM SODIUM 3.38 G: 3; .375 INJECTION, POWDER, LYOPHILIZED, FOR SOLUTION INTRAVENOUS at 10:14

## 2020-01-01 RX ADMIN — MIDAZOLAM 2 MG: 1 INJECTION INTRAMUSCULAR; INTRAVENOUS at 07:31

## 2020-01-01 RX ADMIN — SODIUM CHLORIDE: 9 INJECTION, SOLUTION INTRAVENOUS at 12:36

## 2020-01-01 RX ADMIN — PREDNISOLONE ACETATE 1 DROP: 10 SUSPENSION/ DROPS OPHTHALMIC at 21:59

## 2020-01-01 RX ADMIN — SODIUM CHLORIDE, POTASSIUM CHLORIDE, SODIUM LACTATE AND CALCIUM CHLORIDE: 600; 310; 30; 20 INJECTION, SOLUTION INTRAVENOUS at 06:57

## 2020-01-01 RX ADMIN — OFLOXACIN 1 DROP: 3 SOLUTION/ DROPS OPHTHALMIC at 14:06

## 2020-01-01 RX ADMIN — VANCOMYCIN HYDROCHLORIDE 2000 MG: 5 INJECTION, POWDER, LYOPHILIZED, FOR SOLUTION INTRAVENOUS at 06:08

## 2020-01-01 RX ADMIN — Medication 5 MG: at 07:53

## 2020-01-01 RX ADMIN — PREDNISOLONE ACETATE 1 DROP: 10 SUSPENSION/ DROPS OPHTHALMIC at 19:17

## 2020-01-01 RX ADMIN — Medication 1 DROP: at 09:39

## 2020-01-01 RX ADMIN — OFLOXACIN 1 DROP: 3 SOLUTION/ DROPS OPHTHALMIC at 13:00

## 2020-01-01 RX ADMIN — DICLOFENAC SODIUM 1 DROP: 1 SOLUTION/ DROPS OPHTHALMIC at 09:46

## 2020-01-01 RX ADMIN — PIPERACILLIN SODIUM AND TAZOBACTAM SODIUM 3.38 G: 3; .375 INJECTION, POWDER, LYOPHILIZED, FOR SOLUTION INTRAVENOUS at 21:36

## 2020-01-01 RX ADMIN — PREDNISOLONE ACETATE 1 DROP: 10 SUSPENSION/ DROPS OPHTHALMIC at 09:59

## 2020-01-01 RX ADMIN — PREDNISOLONE ACETATE 1 DROP: 10 SUSPENSION/ DROPS OPHTHALMIC at 08:21

## 2020-01-01 RX ADMIN — SODIUM CHLORIDE: 9 INJECTION, SOLUTION INTRAVENOUS at 21:27

## 2020-01-01 RX ADMIN — SIMVASTATIN 20 MG: 20 TABLET, FILM COATED ORAL at 21:56

## 2020-01-01 RX ADMIN — ACETAMINOPHEN 975 MG: 325 TABLET, FILM COATED ORAL at 08:32

## 2020-01-01 RX ADMIN — OFLOXACIN 1 DROP: 3 SOLUTION/ DROPS OPHTHALMIC at 09:55

## 2020-01-01 RX ADMIN — Medication 1 DROP: at 08:31

## 2020-01-01 RX ADMIN — PRAZOSIN HYDROCHLORIDE 2 MG: 2 CAPSULE ORAL at 08:32

## 2020-01-01 RX ADMIN — OFLOXACIN 1 DROP: 3 SOLUTION/ DROPS OPHTHALMIC at 12:45

## 2020-01-01 RX ADMIN — GADOBUTROL 13 ML: 604.72 INJECTION INTRAVENOUS at 14:36

## 2020-01-01 RX ADMIN — LOSARTAN POTASSIUM 12.5 MG: 25 TABLET, FILM COATED ORAL at 08:32

## 2020-01-01 RX ADMIN — LIDOCAINE HYDROCHLORIDE 40 MG: 20 INJECTION, SOLUTION INFILTRATION; PERINEURAL at 07:33

## 2020-01-01 RX ADMIN — OFLOXACIN 1 DROP: 3 SOLUTION/ DROPS OPHTHALMIC at 08:31

## 2020-01-01 RX ADMIN — PIPERACILLIN SODIUM AND TAZOBACTAM SODIUM 3.38 G: 3; .375 INJECTION, POWDER, LYOPHILIZED, FOR SOLUTION INTRAVENOUS at 04:30

## 2020-01-01 RX ADMIN — SODIUM CHLORIDE, SODIUM LACTATE, POTASSIUM CHLORIDE, CALCIUM CHLORIDE 500 ML: 600; 310; 30; 20 INJECTION, SOLUTION INTRAVENOUS at 08:33

## 2020-01-01 RX ADMIN — DICLOFENAC SODIUM 1 DROP: 1 SOLUTION/ DROPS OPHTHALMIC at 08:20

## 2020-01-01 RX ADMIN — PIPERACILLIN SODIUM AND TAZOBACTAM SODIUM 3.38 G: 3; .375 INJECTION, POWDER, LYOPHILIZED, FOR SOLUTION INTRAVENOUS at 16:22

## 2020-01-01 RX ADMIN — ONDANSETRON 4 MG: 2 INJECTION INTRAMUSCULAR; INTRAVENOUS at 07:34

## 2020-01-01 RX ADMIN — OFLOXACIN 1 DROP: 3 SOLUTION/ DROPS OPHTHALMIC at 08:35

## 2020-01-01 RX ADMIN — PIPERACILLIN SODIUM AND TAZOBACTAM SODIUM 3.38 G: 3; .375 INJECTION, POWDER, LYOPHILIZED, FOR SOLUTION INTRAVENOUS at 04:53

## 2020-01-01 RX ADMIN — OFLOXACIN 1 DROP: 3 SOLUTION/ DROPS OPHTHALMIC at 08:20

## 2020-01-01 RX ADMIN — PROPOFOL 75 MCG/KG/MIN: 10 INJECTION, EMULSION INTRAVENOUS at 07:33

## 2020-01-01 RX ADMIN — LOSARTAN POTASSIUM 12.5 MG: 25 TABLET, FILM COATED ORAL at 12:37

## 2020-01-01 RX ADMIN — PRAZOSIN HYDROCHLORIDE 2 MG: 2 CAPSULE ORAL at 20:15

## 2020-01-01 RX ADMIN — SODIUM CHLORIDE: 9 INJECTION, SOLUTION INTRAVENOUS at 04:53

## 2020-01-01 RX ADMIN — OFLOXACIN 1 DROP: 3 SOLUTION/ DROPS OPHTHALMIC at 09:39

## 2020-01-01 RX ADMIN — OFLOXACIN 1 DROP: 3 SOLUTION/ DROPS OPHTHALMIC at 19:17

## 2020-01-01 RX ADMIN — PREDNISOLONE ACETATE 1 DROP: 10 SUSPENSION/ DROPS OPHTHALMIC at 09:55

## 2020-01-01 RX ADMIN — PIPERACILLIN SODIUM AND TAZOBACTAM SODIUM 3.38 G: 3; .375 INJECTION, POWDER, LYOPHILIZED, FOR SOLUTION INTRAVENOUS at 05:03

## 2020-01-01 RX ADMIN — DICLOFENAC SODIUM 1 DROP: 1 SOLUTION/ DROPS OPHTHALMIC at 09:55

## 2020-01-01 ASSESSMENT — MIFFLIN-ST. JEOR
SCORE: 2173.63
SCORE: 2180.1
SCORE: 2164.67
SCORE: 2140.18
SCORE: 2156.06
SCORE: 2187.35
SCORE: 2169.67
SCORE: 2173.63
SCORE: 2181.91

## 2020-01-01 ASSESSMENT — REFRACTION_WEARINGRX
OD_SPHERE: +0.75
SPECS_TYPE: BIFOCAL
OS_CYLINDER: SPHERE
OD_CYLINDER: +0.50
OD_AXIS: 050
OS_SPHERE: -1.00
OS_ADD: +2.50
OD_ADD: +2.75

## 2020-01-01 ASSESSMENT — ENCOUNTER SYMPTOMS
PARESTHESIAS: 0
JOINT SWELLING: 0
NERVOUS/ANXIOUS: 0
EYE PAIN: 0
DIZZINESS: 1
CONSTIPATION: 0
COUGH: 0
HEMATOCHEZIA: 0
PALPITATIONS: 0
FREQUENCY: 1
CHILLS: 0
PALPITATIONS: 0
COUGH: 0
HEARTBURN: 0
CHILLS: 0
WEAKNESS: 0
HEMATURIA: 0
ABDOMINAL PAIN: 0
MYALGIAS: 0
HEADACHES: 0
ARTHRALGIAS: 1
DIARRHEA: 0
FREQUENCY: 1
DYSURIA: 0
DIZZINESS: 1
DYSURIA: 0
NERVOUS/ANXIOUS: 0
NAUSEA: 0
HEMATURIA: 0
DIARRHEA: 0
CONSTIPATION: 0
FEVER: 0
SORE THROAT: 0
PARESTHESIAS: 0
ABDOMINAL PAIN: 0
SHORTNESS OF BREATH: 0
NAUSEA: 0
HEMATOCHEZIA: 0
SHORTNESS OF BREATH: 0
HEARTBURN: 0
WEAKNESS: 0
MYALGIAS: 0
HEADACHES: 0
ARTHRALGIAS: 1
SORE THROAT: 0
FEVER: 0
JOINT SWELLING: 0
EYE PAIN: 0

## 2020-01-01 ASSESSMENT — ACTIVITIES OF DAILY LIVING (ADL)
ADLS_ACUITY_SCORE: 13
ADLS_ACUITY_SCORE: 10
DOING_ERRANDS_INDEPENDENTLY_DIFFICULTY: NO
ADLS_ACUITY_SCORE: 13
CURRENT_FUNCTION: NO ASSISTANCE NEEDED
ADLS_ACUITY_SCORE: 10
ADLS_ACUITY_SCORE: 13
WEAR_GLASSES_OR_BLIND: NO
ADLS_ACUITY_SCORE: 10
ADLS_ACUITY_SCORE: 13
ADLS_ACUITY_SCORE: 10
ADLS_ACUITY_SCORE: 10
DIFFICULTY_EATING/SWALLOWING: NO
ADLS_ACUITY_SCORE: 13
ADLS_ACUITY_SCORE: 13
ADLS_ACUITY_SCORE: 10
CURRENT_FUNCTION: NO ASSISTANCE NEEDED
ADLS_ACUITY_SCORE: 10
ADLS_ACUITY_SCORE: 13
CONCENTRATING,_REMEMBERING_OR_MAKING_DECISIONS_DIFFICULTY: NO
DRESSING/BATHING_DIFFICULTY: NO
DIFFICULTY_COMMUNICATING: NO
HEARING_DIFFICULTY_OR_DEAF: NO
WALKING_OR_CLIMBING_STAIRS_DIFFICULTY: NO
FALL_HISTORY_WITHIN_LAST_SIX_MONTHS: NO
TOILETING_ISSUES: NO

## 2020-01-01 ASSESSMENT — REFRACTION_MANIFEST
OS_ADD: +2.50
OD_ADD: +2.50
OD_CYLINDER: SPHERE
OD_CYLINDER: +0.75
OS_AXIS: 015
OD_SPHERE: -2.25
OS_SPHERE: +0.25
OS_CYLINDER: +0.25
OD_SPHERE: PLANO
OD_AXIS: 040
OS_SPHERE: -4.50

## 2020-01-01 ASSESSMENT — TONOMETRY
OS_IOP_MMHG: 14
OS_IOP_MMHG: 10
OD_IOP_MMHG: 12
IOP_METHOD: TONOPEN
OD_IOP_MMHG: 15
IOP_METHOD: TONOPEN
OS_IOP_MMHG: 14
IOP_METHOD: TONOPEN
OD_IOP_MMHG: 09
IOP_METHOD: TONOPEN
OD_IOP_MMHG: 14
OS_IOP_MMHG: 08
IOP_METHOD: TONOPEN

## 2020-01-01 ASSESSMENT — VISUAL ACUITY
OD_SC: 20/40-2
OS_CC+: -2
OD_SC: 20/125
METHOD: SNELLEN - LINEAR
OS_PH_SC+: -2
OD_PH_SC: 20/50
METHOD: SNELLEN - LINEAR
OS_SC: 20/150
OD_SC+: -2+1
OD_SC: 20/20
OS_SC: 20/20
METHOD: SNELLEN - LINEAR
OS_PH_SC: 20/60
METHOD: SNELLEN - LINEAR
METHOD: SNELLEN - LINEAR
OS_PH_SC: 20/70
OS_CC: 20/70
OS_SC: 20/200
OD_SC: 20/30

## 2020-01-01 ASSESSMENT — CONF VISUAL FIELD
OD_NORMAL: 1
OS_NORMAL: 1
OS_NORMAL: 1
OD_NORMAL: 1

## 2020-01-01 ASSESSMENT — EXTERNAL EXAM - LEFT EYE
OS_EXAM: NORMAL
OS_EXAM: NORMAL

## 2020-01-01 ASSESSMENT — SLIT LAMP EXAM - LIDS
COMMENTS: NORMAL

## 2020-01-01 ASSESSMENT — CUP TO DISC RATIO
OS_RATIO: 0.2
OS_RATIO: 0.2
OD_RATIO: 0.2
OD_RATIO: 0.2
OS_RATIO: 0.2
OD_RATIO: 0.2

## 2020-01-01 ASSESSMENT — EXTERNAL EXAM - RIGHT EYE
OD_EXAM: NORMAL

## 2020-01-01 ASSESSMENT — LIFESTYLE VARIABLES: TOBACCO_USE: 1

## 2020-02-24 ENCOUNTER — TELEPHONE (OUTPATIENT)
Dept: FAMILY MEDICINE | Facility: CLINIC | Age: 70
End: 2020-02-24

## 2020-02-24 NOTE — TELEPHONE ENCOUNTER
Panel Management Review      Patient has the following on his problem list:     Hypertension   Last three blood pressure readings:  BP Readings from Last 3 Encounters:   04/20/19 161/89   04/15/19 (!) 154/93   04/09/19 (!) 166/104     Blood pressure: FAILED    HTN Guidelines:  Less than 140/90      Composite cancer screening  Chart review shows that this patient is due/due soon for the following None  Summary:    Patient is due/failing the following:   BP CHECK    Action needed:   Patient needs office visit for bp with ma only.    Type of outreach:    Phone, left message for patient to call back.     Questions for provider review:    None                                                                                                                                    Anita Hunt CMA       Chart routed to Care Team .

## 2020-04-21 PROBLEM — M25.551 HIP PAIN, RIGHT: Status: RESOLVED | Noted: 2019-05-20 | Resolved: 2020-04-21

## 2020-04-21 NOTE — PROGRESS NOTES
Discharge Note    Progress reporting period is from initial evaluation date (please see noted date below) to Jun 5, 2019.  Linked Episodes   Type: Episode: Status: Noted: Resolved: Last update: Updated by:   PHYSICAL THERAPY R hip 5/20/19 Active 5/20/2019 6/5/2019 11:44 AM Dylan Wong PT      Comments:       Mauricio failed to follow up and current status is unknown.  Please see information below for last relevant information on current status.  Patient seen for 3 visits.    SUBJECTIVE  Subjective changes noted by patient:  Patient reports he is mildly better.  Notes that he can lay on his right side while sleeping for greater than 20 minutes before pain comes on.  .  Current pain level is 2/10.     Previous pain level was  5/10.   Changes in function:  Yes (See Goal flowsheet attached for changes in current functional level)  Adverse reaction to treatment or activity: None    OBJECTIVE  Changes noted in objective findings: R hip  over greater trochanter with palpation, some hip adductor.      ASSESSMENT/PLAN  Diagnosis: R hip greater trochanteric bursitis   Updated problem list and treatment plan:   Pain - HEP  STG/LTGs have been met or progress has been made towards goals:  Yes, please see goal flowsheet for most current information  Assessment of Progress: current status is unknown.    Last current status: Pt is progressing as expected   Self Management Plans:  HEP  I have re-evaluated this patient and find that the nature, scope, duration and intensity of the therapy is appropriate for the medical condition of the patient.  Mauricio continues to require the following intervention to meet STG and LTG's:  HEP.    Recommendations:  Discharge with current home program.  Patient to follow up with MD as needed.    Please refer to the daily flowsheet for treatment today, total treatment time and time spent performing 1:1 timed codes.

## 2020-09-28 PROBLEM — H25.812 COMBINED FORM OF AGE-RELATED CATARACT, LEFT EYE: Status: ACTIVE | Noted: 2020-01-01

## 2020-09-28 NOTE — TELEPHONE ENCOUNTER
Spoke with patient to schedule left eye surgery with Dr. Pat Knig.    Surgery was scheduled on 10/23/20 at [facility]  Patient will have H&P at 10/7/20 with Dr. King.     Patient is aware a COVID-19 test is needed before their procedure. The test should be with-in 4 days of their procedure.   Test Details: Date10/19/20  Location [facility].    Post-Op visit was scheduled on 10/23 and 11/4.    Patient was advised a / is needed day of surgery. As well as, for 24 hours after their surgery procedure.    Surgery packet was mailed 9/28, patient has my direct contact information for any further questions [phone].

## 2020-10-05 NOTE — NURSING NOTE
Follow up of PVD left eye and cataracts in both eyes - still seeing floaters once in a while but much less. Declines MR until after he has cataract surgery with Dr. King at the end of October. No pain or discomfort in either eye.     Oumou Kline  2:22 PM

## 2020-10-05 NOTE — PROGRESS NOTES
I have confirmed the patient's and reviewed Past Medical History, Past Surgical History, Social History, Family History, Problem List, Medication List and agree with Tech note.    CC: floaters right eye     HPI: Floaters in the right eye have resolved.  Episodic and stable left eye floaters.  He is scheduled for cataract surgery with Dr. King left eye 10/23.  Right eye not yet scheduled.  He is interested in having that evaluated today.    Assessment/plan:   1.  Posterior vitreous detachment (PVD) right eye    - Dx 5/2019   - Retinal detachment/retinal tear precautions discussed with patient  Contact clinic immediately for new floaters/flashers or shadows in vision     2.  Nuclear sclerotic cataract and Posterior subcapsular cataract (PSC)  Both eyes    - Visually significant   - Scheduled for left eye 10/23/2020 with Dr. King    - Would benefit from right eye as well; cataract not far behind    RTC 1 year with dilated fundus exam June 2020    Larry Pedraza, PGY3  Ophthalmology Resident    ATTESTATION:    I have seen and examined the patient with Dr. Pedraza and agree with the findings in this note,as well as the interpretations of the diagnostic tests.    Teddy Gifford MD PhD.  Professor & Chair

## 2020-10-07 NOTE — PROGRESS NOTES
HPI:  Mauricio Barrera is a 69 year old male here for cataract evaluation. He has noticed a progressive worsening of vision in both eyes, left eye more so than right eye, over the past few years. He lives in [facility] much of the year, and he was scheduled to have his cataract removed in [facility], but his blood pressure was too high, and the surgery was canceled. Then with COVID, he was unable to be rescheduled. He has now returned to the [place] with [place].     POH: Cataracts, refractive error, PVD both eyes   PMH: HL, HTN, peripheral neuropathy    Assessment & Plan     (H25.813) Combined form of age-related cataract, both eyes  (primary encounter diagnosis)  Comment: Visually significant both eyes, left eye > right eye, with BCVA of 20/25 right eye and 20/40 left eye with myopic shift of ~ 3D in both eyes. Very dense NS and PSC on exam in both eyes.    Dilates to: 7 mm  Alpha blockers/Flomax: Prazosin  Trauma/Pseudoxfoliation: None  Fuchs dystrophy/guttae: None    Diabetes: No  Anticoagulation: None    Cyl: 0.29 @ 108 right eye, 0.69 @ 061 left eye     We discussed the risks and benefits of cataract surgery, and informed consent was obtained.  Proceed with CE/IOL left eye (already scheduled) followed by right eye (to be scheduled).    Surgical plan:  Topical  Aim emmetropia both eyes    (H43.813) Posterior vitreous detachment, bilateral  Comment: Followed by Dr. Germain  Plan: Discussed signs/sx of RT/RD and the patient knows to call immediately if they develop these symptoms.     (H52.203,  H52.13) Myopic astigmatism of both eyes\(H52.4) Presbyopia  Comment: Vision limited by cataract, large myopic shift  Plan: Hold off on updating new glasses until after CE/IOL      -----------------------------------------------------------------------------------    Patient disposition:   Return for scheduled procedure, or sooner as needed.    Teaching statement:  Complete documentation of historical and exam elements from  today's encounter can be found in the full encounter summary report (not reduplicated in this progress note). I personally obtained the chief complaint(s) and history of present illness.  I confirmed and edited as necessary the review of systems, past medical/surgical history, family history, social history, and examination findings as documented by others; and I examined the patient myself. I personally reviewed the relevant tests, images, and reports as documented above.     I formulated and edited as necessary the assessment and plan and discussed the findings and management plan with the patient and family.      Pat King MD  Comprehensive Ophthalmology & Ocular Pathology  Department of Ophthalmology and Visual Neurosciences  [email or web address]  Pager [phone]

## 2020-10-07 NOTE — NURSING NOTE
Chief Complaints and History of Present Illnesses   Patient presents with     Cataract     Chief Complaint(s) and History of Present Illness(es)     Cataract     Associated symptoms: blurred vision and glare    Context: distance vision              Comments     Cataract evaluation.  The patient has no eye pain.  Trudi Lehman, COA, COA 2:02 PM 10/07/2020

## 2020-10-15 NOTE — TELEPHONE ENCOUNTER
Called patient to schedule procedure with Dr. King, there was no answer.  Left detailed message with dates of Dr. King's availability for surgery message with my direct line [phone].

## 2020-10-19 PROBLEM — H25.21 AGE-RELATED CATARACT, MORGAGNIAN TYPE, RIGHT EYE: Status: ACTIVE | Noted: 2020-01-01

## 2020-10-19 PROBLEM — H25.813 COMBINED FORM OF AGE-RELATED CATARACT, BOTH EYES: Status: ACTIVE | Noted: 2020-01-01

## 2020-10-19 NOTE — TELEPHONE ENCOUNTER
Spoke with patient to schedule right eye surgery with Dr. King    Surgery was scheduled on 11/6 at [facility]  Patient will have H&P at [facility] on 10/20     Patient is aware a COVID-19 test is needed before their procedure. The test should be with-in 4 days of their procedure.   Test Details: Date 11/2 Location [facility]    Post-Op visit was scheduled on 11/6 and 11/11  Patient was advised a / is needed day of surgery. As well as, for 24 hours after their surgery procedure.  Surgery packet was mailed 10/19, patient has my direct contact information for any further questions [phone].

## 2020-10-20 PROBLEM — E66.01 MORBID OBESITY (H): Status: ACTIVE | Noted: 2020-01-01

## 2020-10-20 NOTE — PROGRESS NOTES
"SUBJECTIVE:   Mauricio Barrera is a 69 year old male who presents for Preventive Visit.    {Split Bill scripting  The purpose of this visit is to discuss your medical history and prevent health problems before you are sick. You may be responsible for a co-pay, coinsurance, or deductible if your visit today includes services such as checking on a sore throat, having an x-ray or lab test, or treating and evaluating a new or existing condition :257165}  Patient has been advised of split billing requirements and indicates understanding: {Yes and No:321106}   Are you in the first 12 months of your Medicare coverage?  { :049470::\"No\"}    Healthy Habits:     In general, how would you rate your overall health?  Good    Frequency of exercise:  1 day/week    Duration of exercise:  15-30 minutes    Do you usually eat at least 4 servings of fruit and vegetables a day, include whole grains    & fiber and avoid regularly eating high fat or \"junk\" foods?  Yes    Taking medications regularly:  Yes    Medication side effects:  None    Ability to successfully perform activities of daily living:  No assistance needed    Home Safety:  No safety concerns identified    Hearing Impairment:  Difficulty following a conversation in a noisy restaurant or crowded room and difficulty understanding soft or whispered speech    In the past 6 months, have you been bothered by leaking of urine?  No    In general, how would you rate your overall mental or emotional health?  Good      PHQ-2 Total Score: 0    Additional concerns today:  Yes    Do you feel safe in your environment? { :802565}    Have you ever done Advance Care Planning? (For example, a Health Directive, POLST, or a discussion with a medical provider or your loved ones about your wishes): { :944304}    {Hearing Test Done (Optional):672770}  Fall risk  { :238695}  {If any of the above assessments are answered yes, consider ordering appropriate referrals (Optional):635509::\"click delete " "button to remove this line now\"}  Cognitive Screening { :251307}    {Do you have sleep apnea, excessive snoring or daytime drowsiness? (Optional):405541}    Reviewed and updated as needed this visit by clinical staff                 Reviewed and updated as needed this visit by Provider                Social History     Tobacco Use     Smoking status: Former Smoker     Types: Cigars     Quit date: 2004     Years since quittin.8     Smokeless tobacco: Never Used   Substance Use Topics     Alcohol use: Yes     Comment: rarely      {Rooming Staff- Complete this question if Prescreen response is not shown below for today's visit. If you drink alcohol do you typically have >3 drinks per day or >7 drinks per week? (Optional):974305}    Alcohol Use 10/17/2020   Prescreen: >3 drinks/day or >7 drinks/week? Not Applicable   Prescreen: >3 drinks/day or >7 drinks/week? -   {add AUDIT responses (Optional) (A score of 7 for adult men is an indication of hazardous drinking; a score of 8 or more is an indication of an alcohol use disorder.  A score of 7 or more for adult women is an indication of hazardous drinking or an alchohol use disorder):984772}    {Outside tests to abstract? :880833}    {additional problems to add (Optional):837017}    Current providers sharing in care for this patient include: {Rooming staff:  Please update Care Team in Rooming Activity, refresh this note and then delete this statement}  Patient Care Team:  Leon Hinojosa MD as PCP - General (Family Practice)  Leon Hinojosa MD as Assigned PCP  Magdalena Chatterjee RN as Clinic Care Coordinator (Nurse)  Leon Hinojosa MD as Referring Physician (Family Practice)  Vaughn Garland MD as MD (Urology)  Dorothy Boo RN as Registered Nurse (Urology)  Teddy Germain MD as MD (Ophthalmology)    The following health maintenance items are reviewed in Epic and correct as of today:  Health Maintenance   Topic " "Date Due     A1C  10/09/2019     LIPID  10/09/2019     ADVANCE CARE PLANNING  01/19/2020     MEDICARE ANNUAL WELLNESS VISIT  04/09/2020     FALL RISK ASSESSMENT  04/09/2020     INFLUENZA VACCINE (1) 09/01/2020     DTAP/TDAP/TD IMMUNIZATION (4 - Td) 11/16/2026     COLORECTAL CANCER SCREENING  05/23/2029     HEPATITIS C SCREENING  Completed     PHQ-2  Completed     Pneumococcal Vaccine: 65+ Years  Completed     ZOSTER IMMUNIZATION  Completed     AORTIC ANEURYSM SCREENING (SYSTEM ASSIGNED)  Completed     Pneumococcal Vaccine: Pediatrics (0 to 5 Years) and At-Risk Patients (6 to 64 Years)  Aged Out     IPV IMMUNIZATION  Aged Out     MENINGITIS IMMUNIZATION  Aged Out     HEPATITIS B IMMUNIZATION  Aged Out     {Chronicprobdata (optional):750244}  {Decision Support (Optional):610508}    Review of Systems   Constitutional: Negative for chills and fever.   HENT: Negative for congestion, ear pain, hearing loss and sore throat.    Eyes: Positive for visual disturbance. Negative for pain.   Respiratory: Negative for cough and shortness of breath.    Cardiovascular: Positive for peripheral edema. Negative for chest pain and palpitations.   Gastrointestinal: Negative for abdominal pain, constipation, diarrhea, heartburn, hematochezia and nausea.   Genitourinary: Positive for frequency. Negative for discharge, dysuria, genital sores, hematuria, impotence and urgency.   Musculoskeletal: Positive for arthralgias. Negative for joint swelling and myalgias.   Skin: Negative for rash.   Neurological: Positive for dizziness. Negative for weakness, headaches and paresthesias.   Psychiatric/Behavioral: Negative for mood changes. The patient is not nervous/anxious.      {ROS COMP (Optional):869806}    OBJECTIVE:   There were no vitals taken for this visit. Estimated body mass index is 35.34 kg/m  as calculated from the following:    Height as of 4/20/19: 1.956 m (6' 5\").    Weight as of 4/20/19: 135.2 kg (298 lb).  Physical Exam  {Exam " "(Optional) :159929}    {Diagnostic Test Results (Optional):633650::\"Diagnostic Test Results:\",\"Labs reviewed in Epic\"}    ASSESSMENT / PLAN:   {Dia Picklist:784580}    Patient has been advised of split billing requirements and indicates understanding: {YES / NO:930550::\"Yes\"}  COUNSELING:  {Medicare Counselin}    Estimated body mass index is 35.34 kg/m  as calculated from the following:    Height as of 19: 1.956 m (6' 5\").    Weight as of 19: 135.2 kg (298 lb).    {Weight Management Plan (ACO) Complete if BMI is abnormal-  Ages 18-64  BMI >24.9.  Age 65+ with BMI <23 or >30 (Optional):966642}    He reports that he quit smoking about 16 years ago. His smoking use included cigars. He has never used smokeless tobacco.      Appropriate preventive services were discussed with this patient, including applicable screening as appropriate for cardiovascular disease, diabetes, osteopenia/osteoporosis, and glaucoma.  As appropriate for age/gender, discussed screening for colorectal cancer, prostate cancer, breast cancer, and cervical cancer. Checklist reviewing preventive services available has been given to the patient.    Reviewed patients plan of care and provided an AVS. The {CarePlan:922267} for Mauricio meets the Care Plan requirement. This Care Plan has been established and reviewed with the {PATIENT, FAMILY MEMBER, CAREGIVER:762618}.    Counseling Resources:  ATP IV Guidelines  Pooled Cohorts Equation Calculator  Breast Cancer Risk Calculator  Breast Cancer: Medication to Reduce Risk  FRAX Risk Assessment  ICSI Preventive Guidelines  Dietary Guidelines for Americans, 2010  [employer]'s MyPlate  ASA Prophylaxis  Lung CA Screening    Leon Hinojosa MD, MD  [facility]    Identified Health Risks:  "

## 2020-10-20 NOTE — PATIENT INSTRUCTIONS
"  Patient Education   Personalized Prevention Plan  You are due for the preventive services outlined below.  Your care team is available to assist you in scheduling these services.  If you have already completed any of these items, please share that information with your care team to update in your medical record.    Health Maintenance Due   Topic Date Due     A1C Lab  10/09/2019     Cholesterol Lab  10/09/2019     Discuss Advance Care Planning  01/19/2020     Annual Wellness Visit  04/09/2020     FALL RISK ASSESSMENT  04/09/2020     Flu Vaccine (1) 09/01/2020        Preparing for Your Surgery  Getting started  A surgery nurse will call you to review your health history and instructions. They will give you an arrival time based on your scheduled surgery time.  Please be ready to share the following:    Your doctor's clinic name and phone number    Your medical, surgical and anesthesia history    A list of allergies and sensitivities    A list of medicines, including herbal treatments and over-the-counter drugs    Whether the patient has a legal guardian (ask how to send us the papers in advance)  If your child is having surgery, please ask for a copy of Preparing for Your Child's Surgery.    Preparing for surgery    Within 30 days of surgery: Have an exam at your family clinic (primary care clinic), or go to a pre-operative clinic. This exam is called a \"History and Physical,\" or H&P.    At your H&P exam, talk to your care team about all medicines you take. If you need to stop any medicines before surgery, ask when to start taking them again.  ? We do this for your safety. Many medicines can make you bleed too much during surgery. Some change how well surgery (anesthesia) drugs work.    Call your insurance company to see what it will and won't pay for. Ask if they need to pre-approve the surgery. (If no insurance, call [phone].)    Call your surgeon's clinic if there's any change in your health. This includes " signs of a cold or flu (sore throat, runny nose, cough, rash, fever). It also includes a scrape or scratch near the surgery site.    If you have questions on the day of surgery, call your surgery center.  Eating and drinking guidelines  For your safety: Unless your surgeon tells you otherwise, follow the guidelines below.    Eat and drink as usual until 8 hours before surgery. After that, no food or milk.    Drink clear liquids until 2 hours before surgery. These are liquids you can see through, like water, Gatorade and Propel Water. You may also have black coffee and tea (no cream or milk).    Nothing by mouth within 2 hours of surgery. This includes gum, candy and breath mints.    Stop alcohol the midnight before surgery.    If your family clinic tells you to take medicine on the morning of surgery, it's okay to take it with a sip of water.  Preventing infection    Shower or bathe the night before and morning of your surgery. Follow the instructions your clinic gave you. (If no instructions, use regular soap.)    Don't shave or clip hair near your surgery site. This can lead to skin infection.    Don't smoke the morning of surgery. Smoking increases the risk of infection. You may chew nicotine gum up to 2 hours before surgery. A nicotine patch is okay.  ? Note: Some surgeries require you to completely quit smoking and nicotine. Check with your surgeon.    Your care team will make every effort to keep you safe from infection. We will:  ? Clean our hands often with soap and water (or an alcohol-based hand rub).  ? Clean the skin at your surgery site with a special soap that kills germs. We'll also remove hair from the site as needed.  ? Wear special hair covers, masks, gowns and gloves during surgery.  ? Give antibiotic medicine, if prescribed. Not all surgeries need antibiotics.  What to bring on the day of surgery    Photo ID and insurance card    Copy of your health care directive, if you have one    Glasses and  hearing aides (bring cases)  ? You can't wear contacts during surgery    Inhaler and eye drops, if you use them (tell us about these when you arrive)    CPAP machine or breathing device, if you use them    A few personal items, if spending the night    If you have . . .  ? A pacemaker or ICD (cardiac defibrillator): Bring the ID card.  ? An implanted stimulator: Bring the remote control.  ? A legal guardian: Bring a copy of the certified (court-stamped) guardianship papers.  Please remove any jewelry, including body piercings. Leave jewelry and other valuables at home.  If you're going home the day of surgery  Important: If you don't follow the rules below, we must cancel your surgery.     Arrange for someone to drive you home after surgery. You may not drive, take a taxi or take public transportation by yourself (unless you'll have local anesthesia only).    Arrange for a responsible adult to stay with you overnight. If you don't, we may keep you in the hospital overnight, and you may need to pay the costs yourself.  Questions?   If you have any questions for your care team, list them here: _________________________________________________________________________________________________________________________________________________________________________________________________________________________________________________________________________________________________________________________  For informational purposes only. Not to replace the advice of your health care provider. Copyright   4242-0551 [facility]. All rights reserved. Clinically reviewed by Erika Estrada MD. [employer] 199713 - REV 07/19.    - do not use metformin on the day of surgery.

## 2020-10-20 NOTE — PROGRESS NOTES
[facility] [address] [place] [address]  [phone]  Dept: [phone]    PRE-OP EVALUATION:  Today's date: 10/20/2020    Mauricio Barrera (: 1950) presents for pre-operative evaluation assessment as requested by Pat Quinn MD.  He requires evaluation and anesthesia risk assessment prior to undergoing surgery/procedure for treatment of RIGHT and left EYE CATARACT REMOVAL WITH INTRAOCULAR LENS IMPLANT .    Proposed Surgery/ Procedure: RIGHT and left EYE CATARACT REMOVAL WITH INTRAOCULAR LENS IMPLANT .  Date of Surgery/ Procedure: 2020 and 10/23/2020  Time of Surgery/ Procedure: 9:15 AM and 12:05 PM  Hospital/Surgical Facility: [facility]   Surgery Fax Number: Note does not need to be faxed, will be available electronically in Epic.  Primary Physician: Leon Hinojosa  Type of Anesthesia Anticipated: Combined MAC with Topical    Preoperative Questionnaire:   No - Have you ever had a heart attack or stroke?  No - Have you ever had surgery on your heart or blood vessels, such as a stent, coronary (heart) bypass, or surgery on an artery in the head, neck, heart, or legs?  No - Do you have chest pain when you are physically active?  No - Do you have a history of heart failure?  No - Do you currently have a cold, bronchitis, or symptoms of other respiratory (head and chest) infections?  No - Do you have a cough, shortness of breath, or wheezing?  No - Do you or anyone in your family have a history of blood clots?  No - Do you or anyone in your family have a serious bleeding problem, such as long-lasting bleeding after surgeries or cuts?  No - Have you ever had anemia or been told to take iron pills?  No - Have you had any abnormal blood loss such as black, tarry or bloody stools, or abnormal vaginal bleeding?  No - Have you ever had a blood transfusion?  Yes - Are you willing to have a blood transfusion if it is medically needed  before, during, or after your surgery?  No - Have you or anyone in your family ever had problems with anesthesia (sedation for surgery)?  No - Do you have sleep apnea, excessive snoring, or daytime drowsiness?   No - Do you have any artifical heart valves or other implanted medical devices, such as a pacemaker, defibrillator, or continuous glucose monitor?  No - Do you have any artifical joints?  No - Are you allergic to latex?  No - Is there any chance that you may be pregnant?    Patient has a Health Care Directive or Living Will:  YES    HPI:     HPI related to upcoming procedure: both eyes cataract surgery.    Mostly settled in [place].  Visit has been  and that her to come to [facility].  They were hoping to her back to [place] but due to Covid they are stuck here.  Wishes not to stay in winter but now accepting they will be staying here and has condo and good resources.     Blood pressure and cholesterol are in a good shape.  Missing Metformin because he is prediabetic and he had ulcer on his foot.  He is scheduled to see his podiatrist after his eye surgeries.    MEDICAL HISTORY:     Patient Active Problem List    Diagnosis Date Noted     Age-related cataract, morgagnian type, right eye 10/19/2020     Priority: Medium     Combined form of age-related cataract, both eyes 10/19/2020     Priority: Medium     Added automatically from request for surgery [id number]       Combined form of age-related cataract, left eye 2020     Priority: Medium     Added automatically from request for surgery [id number]       Health Care Home 2016     Priority: Medium     Foot osteomyelitis, right (H) 2016     Priority: Medium     Advanced directives, counseling/discussion 2015     Priority: Medium     Advance Care Planning:   Receipt of ACP document:  Received: Health Care Directive which was witnessed or notarized on 2014.  Document not previously scanned.  Validation form completed and sent with document to  be scanned.  Confirmed/documented designated decision maker(s). See permanent comments section of demographics in clinical tab. View document(s) and details by clicking on code status.   Added by Cande Figueroa on 1/19/2015.             Obesity 03/12/2014     Priority: Medium     Problem list name updated by automated process. Provider to review       Toe ulcer (H) 03/11/2014     Priority: Medium     Impaired fasting glucose 03/11/2014     Priority: Medium     Peripheral neuropathy 03/06/2014     Priority: Medium     Torsion, testicular 11/22/2013     Priority: Medium     Previous history, had surgery - no removal of testicle.        Hypertension goal BP (blood pressure) < 140/90 03/15/2011     Priority: Medium     Hyperlipidemia with target LDL less than 130      Priority: Medium     Diagnosis updated by automated process. Provider to review and confirm.        Past Medical History:   Diagnosis Date     Calculus of gallbladder without mention of cholecystitis or obstruction 2006    screened,  neg for AAA,,Crtd stnss,PAD     Combined forms of age-related cataract of left eye      Foot osteomyelitis, right (H)      Hyperlipidemia LDL goal < 130      Hypertension      Incidental lung nodule, > 3mm and < 8mm 02/2004    3.5 mm nodule right lung base - stable by CT x 2 years     Lumbago      Rectal bleeding      Past Surgical History:   Procedure Laterality Date     AMPUTATE FOOT Right 9/22/2016    Procedure: AMPUTATE FOOT;  Surgeon: Melchor Auguste DPM;  Location: [facility]     AMPUTATE FOOT Right 9/24/2016    Procedure: AMPUTATE FOOT;  Surgeon: Venkat Ramos DPM;  Location: [facility]     AMPUTATE TOE(S)  5/13/2014    Procedure: AMPUTATE TOE(S);  Surgeon: Venkat Ramos DPM;  [facility]      COLONOSCOPY THRU STOMA, DIAGNOSTIC  3/03    diverticulae,some inflamed,hemorrhoids     HEMORRHOIDECTOMY  4/07    single,anterior     IRRIGATION AND DEBRIDEMENT FOOT, COMBINED Right 9/22/2016    Procedure: COMBINED  "IRRIGATION AND DEBRIDEMENT FOOT;  Surgeon: Melchor Auguste DPM;  Location:  OR     REPAIR HAMMER TOE  2014    Procedure: REPAIR HAMMER TOE;  Surgeon: Venkat Ramos DPM;  Location:  OR     [facility] NONSPECIFIC PROCEDURE      tonsillectomy     [facility] NONSPECIFIC PROCEDURE      hernia surgery     [facility] NONSPECIFIC PROCEDURE      reduction torsion left testis     Current Outpatient Medications   Medication Sig Dispense Refill     metFORMIN (GLUCOPHAGE) 1000 MG tablet Take 1,000 mg by mouth daily (with breakfast)       olmesartan (BENICAR) 20 MG tablet daily       prazosin (MINIPRESS) 2 MG capsule daily       simvastatin (ZOCOR) 20 MG tablet Take 1 tablet (20 mg) by mouth At Bedtime 90 tablet 3     telmisartan (MICARDIS) 40 MG tablet Take 1 tablet (40 mg) by mouth daily       OTC products: None, except as noted above    Allergies   Allergen Reactions     No Known Drug Allergies       Latex Allergy: NO    Social History     Tobacco Use     Smoking status: Former Smoker     Types: Cigars     Quit date: 2004     Years since quittin.8     Smokeless tobacco: Never Used   Substance Use Topics     Alcohol use: Yes     Comment: rarely      History   Drug Use No     REVIEW OF SYSTEMS:   Constitutional, neuro, ENT, endocrine, pulmonary, cardiac, gastrointestinal, genitourinary, musculoskeletal, integument and psychiatric systems are negative, except as otherwise noted.    EXAM:   /68 (BP Location: Left arm, Patient Position: Sitting, Cuff Size: Adult Large)   Pulse 68   Temp 98.7  F (37.1  C) (Oral)   Resp 16   Ht 1.911 m (6' 3.25\")   Wt 132.9 kg (293 lb)   SpO2 97%   BMI 36.38 kg/m      GENERAL APPEARANCE: healthy, alert and no distress     EYES: EOMI,  PERRL     HENT: ear canals and TM's normal       NECK: no adenopathy, no asymmetry, masses, or scars and thyroid normal to palpation     RESP: lungs clear to auscultation - no rales, rhonchi or wheezes     CV: regular rates and rhythm, normal S1 " S2, no S3 or S4 and no murmur, click or rub     MS: some unsteady gait - overall feeling fine.      SKIN: no suspicious lesions or rashes on visible parts     NEURO: Normal strength and tone, sensory exam grossly normal, mentation intact and speech normal     PSYCH: mentation appears normal. and affect normal/bright     LYMPHATICS: No cervical adenopathy    DIAGNOSTICS:   No labs or EKG required for low risk surgery (cataract, skin procedure, breast biopsy, etc) - needing yearly labs.     Recent Labs   Lab Test 04/20/19  0011 04/09/19  0957 04/16/18  1214   HGB 15.2  --  15.6     --  285   INR 1.14  --   --     139 140   POTASSIUM 3.7 3.7 3.7   CR 1.22 0.68 0.69   A1C  --  6.3* 6.0*        IMPRESSION:   Reason for surgery/procedure: Cataract  Diagnosis/reason for consult: Preop    The proposed surgical procedure is considered LOW risk.    REVISED CARDIAC RISK INDEX  The patient has the following serious cardiovascular risks for perioperative complications such as (MI, PE, VFib and 3  AV Block):  No serious cardiac risks  INTERPRETATION: 0 risks: Class I (very low risk - 0.4% complication rate)    The patient has the following additional risks for perioperative complications:  See below      ICD-10-CM    1. Preop general physical exam  Z01.818    2. Hyperlipidemia with target LDL less than 130  E78.5 Lipid panel reflex to direct LDL Fasting     Comprehensive metabolic panel   3. Hypertension goal BP (blood pressure) < 140/90  I10 Comprehensive metabolic panel     Albumin Random Urine Quantitative with Creat Ratio   4. Impaired fasting glucose  R73.01 Hemoglobin A1c     Comprehensive metabolic panel   5. Morbid obesity (H)  E66.01    6. Encounter for screening for other viral diseases  Z11.59 Asymptomatic COVID-19 Virus (Coronavirus) by PCR     RECOMMENDATIONS:     --Consult hospital rounder / IM to assist post-op medical management if any complication arises    --Going to skip Metformin on the day of the  surgery.  Okay to continue rest of the medications as prescribed.  Not using any NSAIDs.    --He can call for refills of his medications when he needs.         APPROVAL GIVEN to proceed with proposed procedure, without further diagnostic evaluation       Signed Electronically by: Leon Hinojosa MD, MD    Copy of this evaluation report is provided to requesting physician.    [facility] Preop Guidelines    Revised Cardiac Risk Index

## 2020-10-21 NOTE — TELEPHONE ENCOUNTER
Disregard-Pt realized it was an old message he was returning a call for which has been resolved. Monica Griffith RN

## 2020-10-21 NOTE — TELEPHONE ENCOUNTER
Reason for Call:  Other call back    Detailed comments: pt states spoke to a nurse Monica about a message from Dr Hinojosa , would like to discuss further    Phone Number Patient can be reached at: Home number on file [phone] (home)    Best Time: asap    Can we leave a detailed message on this number? YES    Call taken on 10/21/2020 at 12:33 PM by Mary Tate

## 2020-10-22 NOTE — ANESTHESIA PREPROCEDURE EVALUATION
"Anesthesia Pre-Procedure Evaluation    Patient: Mauricio Barrera   MRN:     [id number] Gender:   male   Age:    69 year old :      1950        Preoperative Diagnosis: Combined form of age-related cataract, left eye [H25.812]   Procedure(s):  LEFT EYE CATARACT REMOVAL WITH INTRAOCULAR LENS IMPLANT     LABS:  CBC:   Lab Results   Component Value Date    WBC 12.0 (H) 2019    WBC 9.4 2018    HGB 15.2 2019    HGB 15.6 2018    HCT 43.5 2019    HCT 45.5 2018     2019     2018     BMP:   Lab Results   Component Value Date     10/20/2020     2019    POTASSIUM 3.9 10/20/2020    POTASSIUM 3.7 2019    CHLORIDE 104 10/20/2020    CHLORIDE 104 2019    CO2 25 10/20/2020    CO2 27 2019    BUN 22 10/20/2020    BUN 26 2019    CR 0.82 10/20/2020    CR 1.22 2019     (H) 10/20/2020     (H) 2019     COAGS:   Lab Results   Component Value Date    INR 1.14 2019     POC:   Lab Results   Component Value Date     (H) 2016     OTHER:   Lab Results   Component Value Date    A1C 5.6 10/20/2020    SHEN 9.5 10/20/2020    MAG 2.2 2016    ALBUMIN 3.6 10/20/2020    PROTTOTAL 8.2 10/20/2020    ALT 32 10/20/2020    AST 28 10/20/2020    ALKPHOS 51 10/20/2020    BILITOTAL 0.4 10/20/2020    TSH 5.19 (H) 2003    T4 0.94 2003    CRP 8.8 (H) 10/05/2016    SED 22 (H) 10/05/2016        Preop Vitals    BP Readings from Last 3 Encounters:   10/20/20 112/68   19 161/89   04/15/19 (!) 154/93    Pulse Readings from Last 3 Encounters:   10/20/20 68   19 65   19 64      Resp Readings from Last 3 Encounters:   10/20/20 16   19 16   19 12    SpO2 Readings from Last 3 Encounters:   10/20/20 97%   19 94%   19 96%      Temp Readings from Last 1 Encounters:   10/20/20 37.1  C (98.7  F) (Oral)    Ht Readings from Last 1 Encounters:   10/20/20 1.911 m (6' 3.25\") " "     Wt Readings from Last 1 Encounters:   10/20/20 132.9 kg (293 lb)    Estimated body mass index is 36.38 kg/m  as calculated from the following:    Height as of 10/20/20: 1.911 m (6' 3.25\").    Weight as of 10/20/20: 132.9 kg (293 lb).     LDA:        Past Medical History:   Diagnosis Date     Calculus of gallbladder without mention of cholecystitis or obstruction 2006    screened,  neg for AAA,,Crtd stnss,PAD     Combined forms of age-related cataract of left eye      Foot osteomyelitis, right (H)      Hyperlipidemia LDL goal < 130      Hypertension      Incidental lung nodule, > 3mm and < 8mm 02/2004    3.5 mm nodule right lung base - stable by CT x 2 years     Lumbago      Rectal bleeding       Past Surgical History:   Procedure Laterality Date     AMPUTATE FOOT Right 9/22/2016    Procedure: AMPUTATE FOOT;  Surgeon: Melchor Auguste DPM;  [facility]: [facility]     AMPUTATE FOOT Right 9/24/2016    Procedure: AMPUTATE FOOT;  Surgeon: Venkat Ramos DPM;  Location:  OR     AMPUTATE TOE(S)  5/13/2014    Procedure: AMPUTATE TOE(S);  Surgeon: Venkat Ramos DPM;  [facility]      COLONOSCOPY THRU STOMA, DIAGNOSTIC  3/03    diverticulae,some inflamed,hemorrhoids     HEMORRHOIDECTOMY  4/07    single,anterior     IRRIGATION AND DEBRIDEMENT FOOT, COMBINED Right 9/22/2016    Procedure: COMBINED IRRIGATION AND DEBRIDEMENT FOOT;  Surgeon: Melchor Auguste DPM;  Location:  OR     REPAIR HAMMER TOE  5/13/2014    Procedure: REPAIR HAMMER TOE;  Surgeon: Venkat Ramos DPM;  [facility]     [facility] NONSPECIFIC PROCEDURE      tonsillectomy     [facility] NONSPECIFIC PROCEDURE      hernia surgery     [facility] NONSPECIFIC PROCEDURE  5/04    reduction torsion left testis      Allergies   Allergen Reactions     No Known Drug Allergies                  PHYSICAL EXAM:   Mental Status/Neuro: A/A/O   Airway: Facies: Feasible  Mallampati: I  Mouth/Opening: Full  TM distance: > 6 cm  Neck ROM: Full   Respiratory:   Resp. " Rate: Normal     Resp. Effort: Normal      CV:   Rate: Age appropriate  Edema: None   Comments:      Dental: Normal Dentition                Assessment:   ASA SCORE: 3    H&P: History and physical reviewed and following examination; no interval change.    NPO Status: NPO Appropriate     Plan:   Anes. Type:  MAC   Pre-Medication: None   Induction:  N/a   Airway: Native Airway   Access/Monitoring: PIV   Maintenance: N/a     Postop Plan:   Postop Pain: None  Postop Sedation/Airway: Not planned  Disposition: Outpatient     PONV Management:    Prevention: Ondansetron     CONSENT: Direct conversation   Plan and risks discussed with: Patient                      Larry Conley MD     ANESTHESIA PREOP EVALUATION    PROCEDURE: Procedure(s):  LEFT EYE CATARACT REMOVAL WITH INTRAOCULAR LENS IMPLANT    HPI: Mauricio Barrera is a 69 year old male who presents for above procedure.    PAST MEDICAL HISTORY:    Past Medical History:   Diagnosis Date     Calculus of gallbladder without mention of cholecystitis or obstruction 2006    screened,  neg for AAA,,Crtd stnss,PAD     Combined forms of age-related cataract of left eye      Foot osteomyelitis, right (H)      Hyperlipidemia LDL goal < 130      Hypertension      Incidental lung nodule, > 3mm and < 8mm 02/2004    3.5 mm nodule right lung base - stable by CT x 2 years     Lumbago      Rectal bleeding        PAST SURGICAL HISTORY:    Past Surgical History:   Procedure Laterality Date     AMPUTATE FOOT Right 9/22/2016    Procedure: AMPUTATE FOOT;  Surgeon: Melchor Auguste DPM;  Location:  OR     AMPUTATE FOOT Right 9/24/2016    Procedure: AMPUTATE FOOT;  Surgeon: Venkat Ramos DPM;  Location:  OR     AMPUTATE TOE(S)  5/13/2014    Procedure: AMPUTATE TOE(S);  Surgeon: Venkat Ramos DPM;  [facility]      COLONOSCOPY THRU STOMA, DIAGNOSTIC  3/03    diverticulae,some inflamed,hemorrhoids     HEMORRHOIDECTOMY  4/07    single,anterior     IRRIGATION AND  DEBRIDEMENT FOOT, COMBINED Right 2016    Procedure: COMBINED IRRIGATION AND DEBRIDEMENT FOOT;  Surgeon: Melchor Auguste DPM;  [facility]: [facility]     REPAIR HAMMER TOE  2014    Procedure: REPAIR HAMMER TOE;  Surgeon: Venkat Ramos DPM;  [facility]     [facility] NONSPECIFIC PROCEDURE      tonsillectomy     [facility] NONSPECIFIC PROCEDURE      hernia surgery     [facility] NONSPECIFIC PROCEDURE      reduction torsion left testis       SOCIAL HISTORY:       Social History     Tobacco Use     Smoking status: Former Smoker     Types: Cigars     Quit date: 2004     Years since quittin.8     Smokeless tobacco: Never Used   Substance Use Topics     Alcohol use: Yes     Comment: rarely        ALLERGIES:     Allergies   Allergen Reactions     No Known Drug Allergies        MEDICATIONS:     (Not in a hospital admission)      Current Outpatient Medications   Medication Sig Dispense Refill     metFORMIN (GLUCOPHAGE) 1000 MG tablet Take 1,000 mg by mouth daily (with breakfast)       ofloxacin (OCUFLOX) 0.3 % ophthalmic solution 1 drop 4x daily in the surgical eye for 1 week after surgery, then stop 1 Bottle 0     olmesartan (BENICAR) 20 MG tablet daily       prazosin (MINIPRESS) 2 MG capsule daily       prednisoLONE acetate (PRED FORTE) 1 % ophthalmic suspension 1 drop in surgical eye as directed, 4x daily after surgery for 1 week, 3x daily for 1 week, 2x daily for 1 week, daily for 1 week, then stop 1 Bottle 1     simvastatin (ZOCOR) 20 MG tablet Take 1 tablet (20 mg) by mouth At Bedtime 90 tablet 3     telmisartan (MICARDIS) 40 MG tablet Take 1 tablet (40 mg) by mouth daily         Current Outpatient Medications Ordered in Epic   Medication Sig Dispense Refill     metFORMIN (GLUCOPHAGE) 1000 MG tablet Take 1,000 mg by mouth daily (with breakfast)       ofloxacin (OCUFLOX) 0.3 % ophthalmic solution 1 drop 4x daily in the surgical eye for 1 week after surgery, then stop 1 Bottle 0     olmesartan (BENICAR) 20 MG  tablet daily       prazosin (MINIPRESS) 2 MG capsule daily       prednisoLONE acetate (PRED FORTE) 1 % ophthalmic suspension 1 drop in surgical eye as directed, 4x daily after surgery for 1 week, 3x daily for 1 week, 2x daily for 1 week, daily for 1 week, then stop 1 Bottle 1     simvastatin (ZOCOR) 20 MG tablet Take 1 tablet (20 mg) by mouth At Bedtime 90 tablet 3     telmisartan (MICARDIS) 40 MG tablet Take 1 tablet (40 mg) by mouth daily       Current Facility-Administered Medications Ordered in Epic   Medication Dose Route Frequency Provider Last Rate Last Dose     lidocaine 1 % injection 4 mL  4 mL   Beltran Snider MD   4 mL at 04/15/19 1450     lidocaine 1 % injection 5 mL  5 mL   Beltran Snider MD   5 mL at 04/15/19 1450     triamcinolone (KENALOG-40) injection 40 mg  40 mg   Beltran Snider MD   40 mg at 04/15/19 1450       PHYSICAL EXAM:    Vitals: T Data Unavailable, P Data Unavailable, BP Data Unavailable, R Data Unavailable, SpO2  , Weight   Wt Readings from Last 2 Encounters:   10/20/20 132.9 kg (293 lb)   04/20/19 135.2 kg (298 lb)       See doc flowsheet    NPO STATUS: see doc flowsheet    LABS:    BMP:  Recent Labs   Lab Test 10/20/20  1041      POTASSIUM 3.9   CHLORIDE 104   CO2 25   BUN 22   CR 0.82   *   SHEN 9.5       LFTs:   Recent Labs   Lab Test 10/20/20  1041   PROTTOTAL 8.2   ALBUMIN 3.6   BILITOTAL 0.4   ALKPHOS 51   AST 28   ALT 32       CBC:   Recent Labs   Lab Test 04/20/19  0011   WBC 12.0*   RBC 4.79   HGB 15.2   HCT 43.5   MCV 91   MCH 31.7   MCHC 34.9   RDW 13.0          Coags:  Recent Labs   Lab Test 04/20/19  0011   INR 1.14       Imaging:  No orders to display       Larry Conley MD  Anesthesiology Staff  Pager [phone]    10/22/2020  6:12 PM

## 2020-10-23 NOTE — PROGRESS NOTES
POD#0, status post cataract surgery, left eye    No complaints.  Denies eye pain.    VAsc 20/70, ph 20/70, IOP 10    Impression/Plan:  Pseudophakia, OS: POD0, good post-operative appearance. IOP reasonable.      Eye protection at all times and eye shield at night for 1 week.    Limited activities with no exercise or heavy lifting for 1 week.    Instructed patient to contact us for decreasing vision, eye pain, new floaters or flashes of light or other concerning symptoms.    Written instructions given    Return to surgery right eye 11/6/2020.    Parag Chandler MD  Ophthalmology PGY-3\    Teaching statement:  Complete documentation of historical and exam elements from today's encounter can be found in the full encounter summary report (not reduplicated in this progress note). I personally obtained the chief complaint(s) and history of present illness.  I confirmed and edited as necessary the review of systems, past medical/surgical history, family history, social history, and examination findings as documented by others; and I examined the patient myself. I personally reviewed the relevant tests, images, and reports as documented above.     I formulated and edited as necessary the assessment and plan and discussed the findings and management plan with the patient and family.    Pat King MD  Comprehensive Ophthalmology & Ocular Pathology  Department of Ophthalmology and Visual Neurosciences  [email or web address]  Pager [phone]

## 2020-10-23 NOTE — ANESTHESIA POSTPROCEDURE EVALUATION
Anesthesia POST Procedure Evaluation    Patient: Mauricio Barrera   MRN:     [id number] Gender:   male   Age:    69 year old :      1950        Preoperative Diagnosis: Combined form of age-related cataract, left eye [H25.812]   Procedure(s):  LEFT EYE CATARACT REMOVAL WITH INTRAOCULAR LENS IMPLANT   Postop Comments: No value filed.     Anesthesia Type: MAC       Disposition: Outpatient   Postop Pain Control: Uneventful            Sign Out: Well controlled pain   PONV: No   Neuro/Psych: Uneventful            Sign Out: Acceptable/Baseline neuro status   Airway/Respiratory: Uneventful            Sign Out: Acceptable/Baseline resp. status   CV/Hemodynamics: Uneventful            Sign Out: Acceptable CV status   Other NRE: NONE   DID A NON-ROUTINE EVENT OCCUR? No         Last Anesthesia Record Vitals:  CRNA VITALS  10/23/2020 0907 - 10/23/2020 1007      10/23/2020             Pulse:  63    Ht Rate:  62    SpO2:  (!) 85 %    Resp Rate (set):  10          Last PACU Vitals:  No vitals data found for the desired time range.        Electronically Signed By: Larry Conley MD, 2020, 10:33 AM

## 2020-10-23 NOTE — DISCHARGE INSTRUCTIONS
"[facility]  Home Care Following Anesthesia  For 24 hours after surgery:  1. Get plenty of rest.  A responsible adult must stay with you for at least 24 hours after you leave the surgery center.  2. Do not drive or use heavy equipment.  If you have weakness or tingling, don't drive or use heavy equipment until this feeling goes away.   3. Do not drink alcohol.   4. Avoid strenuous or risky activities.  Ask for help when climbing stairs.  5. You may feel lightheaded.  IF so, sit for a few minutes before standing.  Have someone help you get up.   6. If you have nausea (feel sick to your stomach): Drink only clear liquids such as apple juice, ginger ale, broth or 7-Up.  Rest may also help.  Be sure to drink enough fluids.  Move to a regular diet as you feel able.   7. You may have a slight fever.  Call the doctor if your fever is over 100 F (37.7 C) (taken under the tongue) or lasts longer than 24 hours.  8. You may have a dry mouth, a sore throat, muscle aches or trouble sleeping. These should go away after 24 hours.  9. Do not make important or legal decisions.        Today you received a Marcaine or bupivacaine block to numb the nerves near your surgery site.  This is a block using local anesthetic or \"numbing\" medication injected around the nerves to anesthetize or \"numb\" the area supplied by those nerves.  This block is injected into the muscle layer near your surgical site.  The medication may numb the location where you had surgery for 6-18 hours, but may last up to 24 hours.  If your surgical site is an arm or leg you should be careful with your affected limb, since it is possible to injure your limb without being aware of it due to the numbing.  Until full feeling returns, you should guard against bumping or hitting your limb, and avoid extreme hot or cold temperatures on the skin.  As the block wears off, the feeling will return as a tingling or prickly sensation near your " surgical site.  You will experience more discomfort from your incision as the feeling returns.  You may want to take a pain pill (a narcotic or Tylenol if this was prescribed by your surgeon) when you start to experience mild pain before the pain beccomes more severe.  If your pain medications do not control your pain you should notifiy your surgeon.    Tips for taking pain medications  To get the best pain relief possible, remember these points:    Take pain medications as directed, before pain becomes severe.    Pain medication can upset your stomach: taking it with food may help.    Constipation is a common side effect of pain medication. Drink plenty of  fluids.    Eat foods high in fiber. Take a stool softener if recommended by your doctor or pharmacist.    Do not drink alcohol, drive or operate machinery while taking pain medications.    Ask about other ways to control pain, such as with heat, ice or relaxation.    Tylenol/Acetaminophen Consumption  To help encourage the safe use of acetaminophen, the makers of TYLENOL  have lowered the maximum daily dose for single-ingredient Extra Strength TYLENOL  (acetaminophen) products sold in the [place] from 8 pills per day (4,000 mg) to 6 pills per day (3,000 mg). The dosing interval has also changed from 2 pills every 4-6 hours to 2 pills every 6 hours.    If you feel your pain relief is insufficient, you may take Tylenol/Acetaminophen in addition to your narcotic pain medication.     Be careful not to exceed 3,000 mg of Tylenol/Acetaminophen in a 24 hour period from all sources.    If you are taking extra strength Tylenol/acetaminophen (500 mg), the maximum dose is 6 tablets in 24 hours.    If you are taking regular strength acetaminophen (325 mg), the maximum dose is 9 tablets in 24 hours.    Call a doctor for any of the followin. Signs of infection (fever, growing tenderness at the surgery site, a large amount of drainage or bleeding, severe pain, foul-smelling  drainage, redness, swelling).  2. It has been over 8 to 10 hours since surgery and you are still not able to urinate (pass water).  3. Headache for over 24 hours.  4. Numbness, tingling or weakness the day after surgery (if you had spinal anesthesia).  5. Signs of Covid-19 infection (temperature over 100 degrees, shortness of breath, cough, loss of taste/smell, generalized body aches, persistent headache, chills, sore throat, nausea/vomiting/diarrhea)  Your doctor is:       Dr. Pat King, Ophthalmology: [phone]               Or dial [phone] and ask for the resident on call for:  Ophthalmology  For emergency care, call the:  [place] [facility]:  [phone] (TTY for hearing impaired: [phone])

## 2020-10-23 NOTE — PROCEDURES
Ophthalmology Post-op Procedure Note    Surgical Service:    Ophthalmology & Visual Sciences  Date Performed:      October 23, 2020  Location: [facility]      Pre-operative Diagnosis: Visually significant cataract, left eye  Post-operative Diagnosis:  Pseudophakia, left eye  Operative Procedure:  Phacoemulsification with intraocular lens implantation, left eye    Surgeon(s):  Fellow/Staff Surgeon:       Pat King MD  Resident Surgeon:            Verónica Chandler MD    Anesthesia:   Topical/MAC  Findings:  No unusual findings   Blood Loss:    Minimal  Implants:  ZCB00 18.5 intraocular lens  Specimens:  None     Complications:  The patient did not experience any complications.   Condition: Stable    Operative Report Completion:    Description of Operation/Procedure:  After appropriate informed consent was obtained, the patient was brought to the operating room. The appropriate cardiac and blood pressure monitors were placed. A final pause occurred just before the start of the procedure during which the entire procedure team actively confirmed the correct patient, procedure, site, special equipment and special requirements. The patient was prepped and draped in the usual sterile fashion using 5% povidone/iodine.     An eyelid speculum was placed to open the eyelids. A paracentesis port was placed approximately sixty degrees to the left of the planned temporal incision location using the sideport blade. Approximately 0.8 cc of 1% nonpreserved lidocaine was placed into the anterior chamber. The anterior chamber was filled with dispersive viscoelastic. A clear corneal temporal incision was made with a metal 2.6 mm keratome. A round continuous tear capsulorhexis was initiated with a cystotome and completed with the Utrata forceps. Balanced salt solution on a cannula was used to perform hydrodissection. The nucleus was removed using phacoemulsification with a chop technique. The remaining cortical material was removed  using the irrigation/aspiration handpiece. The capsular bag was filled with dispersive viscoelastic. A lens of the  model and power listed above was placed into the capsular bag using the cartridge injection system. The remaining viscoelastic was removed using the irrigation aspiration handpiece. The paracentesis and temporal wounds were hydrated with balanced salt solution. Intracameral moxifloxacin was administered. At the conclusion of the case, the wounds were felt to be watertight, the pupil was round, the lens was centered, and the anterior chamber was deep. A few drops of antibiotic and prednisolone were given to the operative eye. The eyelid speculum was removed. A shield was placed over the operative eye.    Attending Attestation:  I was present for and performed key portions of the procedure.  Pat King MD

## 2020-10-23 NOTE — ANESTHESIA CARE TRANSFER NOTE
Patient: Mauricio Barrera    Procedure(s):  LEFT EYE CATARACT REMOVAL WITH INTRAOCULAR LENS IMPLANT    Diagnosis: Combined form of age-related cataract, left eye [H25.812]  Diagnosis Additional Information: No value filed.    Anesthesia Type:   MAC     Note:  Airway :Room Air  Patient transferred to:Phase II  Comments: VSS and WNL, comfortable, no PONV, report to Ronny TURPINHandoff Report: Identifed the Patient, Identified the Reponsible Provider, Reviewed the pertinent medical history, Discussed the surgical course, Reviewed Intra-OP anesthesia mangement and issues during anesthesia, Set expectations for post-procedure period and Allowed opportunity for questions and acknowledgement of understanding      Vitals: (Last set prior to Anesthesia Care Transfer)    CRNA VITALS  10/23/2020 0907 - 10/23/2020 0941      10/23/2020             Pulse:  63    Ht Rate:  62    SpO2:  (!) 85 %    Resp Rate (set):  10                Electronically Signed By: JEREMY Campbell CRNA  October 23, 2020  9:41 AM

## 2020-11-06 NOTE — NURSING NOTE
Chief Complaints and History of Present Illnesses   Patient presents with     Post Op (Ophthalmology) Right Eye     POD#0 s/p CE/IOL RE     Chief Complaint(s) and History of Present Illness(es)     Post Op (Ophthalmology) Right Eye     Laterality: right eye    Quality: blurred vision    Associated symptoms: photophobia.  Negative for eye pain, floaters, flashes and double vision    Pain scale: 0/10    Comments: POD#0 s/p CE/IOL RE              Comments     Patient states he is feeling well uriah Philippe COT November 6, 2020 11:43 AM

## 2020-11-06 NOTE — PROCEDURES
Ophthalmology Post-op Procedure Note    Surgical Service:    Ophthalmology & Visual Sciences  Date Performed:      November 6, 2020  Location: [facility]      Pre-operative Diagnosis: Visually significant cataract, right eye  Post-operative Diagnosis:  Pseudophakia, right eye  Operative Procedure:  Phacoemulsification with intraocular lens implantation, right eye      Surgeon(s):  Fellow/Staff Surgeon:       Pat King MD  Resident Surgeon:            Larry Pedraza MD    Anesthesia:   Topical/Local  Findings:  No unusual findings   Blood Loss:    Minimal  Implants:  ZCB00 19.0 intraocular lens  Specimens:  None     Complications:  The patient did not experience any complications.   Condition: Stable    Operative Report Completion:    Description of Operation/Procedure:    After appropriate informed consent was obtained, the patient was brought to the operating room. The appropriate cardiac and blood pressure monitors were placed. A final pause occurred just before the start of the procedure during which the entire procedure team actively confirmed the correct patient, procedure, site, special equipment and special requirements. The patient was prepped and draped in the usual sterile fashion using 5% povidone/iodine.     An eyelid speculum was placed to open the eyelids. A paracentesis port was placed approximately sixty degrees to the left of the planned temporal incision location using the sideport blade. Approximately 0.8 cc of 1% nonpreserved lidocaine was placed into the anterior chamber. The anterior chamber was filled with dispersive viscoelastic. A clear corneal temporal incision was made with a metal 2.6 mm keratome. A round continuous tear capsulorhexis was initiated with a cystotome and completed with the Utrata forceps. Balanced salt solution on a cannula was used to perform hydrodissection. The nucleus was removed using phacoemulsification with a chop technique. The remaining cortical material  was removed using the irrigation/aspiration handpiece. The capsular bag was filled with dispersive viscoelastic. A lens of the  model and power listed above was placed into the capsular bag using the cartridge injection system. The remaining viscoelastic was removed using the irrigation aspiration handpiece. The paracentesis and temporal wounds were hydrated with balanced salt solution. Intracameral moxifloxacin was administered. At the conclusion of the case, the wounds were felt to be watertight, the pupil was round, the lens was centered, and the anterior chamber was deep. A few drops of antibiotic and prednisolone were given to the operative eye. The eyelid speculum was removed. A shield was placed over the operative eye.    Attending Attestation:  I was present for and performed the entire procedure.  Pat King MD

## 2020-11-06 NOTE — PROGRESS NOTES
POD#0, status post cataract surgery, right eye    No complaints.  Denies eye pain.    Impression/Plan:  Pseudophakia, OD: POD0, good post-operative appearance. IOP reasonable.    - Fluoroquinolone antibiotic 4x daily in the surgical eye for 1 week  - Prednisolone 4x daily in the surgical eye for 1 week, then weekly taper      Eye protection at all times and eye shield at night for 1 week.    Limited activities with no exercise or heavy lifting for 1 week.    Instructed patient to contact us for decreasing vision, eye pain, new floaters or flashes of light or other concerning symptoms.    Written instructions given    Return to clinic 2-3 weeks with refraction and dilation.    Teaching statement:  Complete documentation of historical and exam elements from today's encounter can be found in the full encounter summary report (not reduplicated in this progress note). I personally obtained the chief complaint(s) and history of present illness.  I confirmed and edited as necessary the review of systems, past medical/surgical history, family history, social history, and examination findings as documented by others; and I examined the patient myself. I personally reviewed the relevant tests, images, and reports as documented above.     I formulated and edited as necessary the assessment and plan and discussed the findings and management plan with the patient and family.    Pat King MD  Comprehensive Ophthalmology & Ocular Pathology  Department of Ophthalmology and Visual Neurosciences  [email or web address]  Pager [phone]

## 2020-11-09 NOTE — Clinical Note
Jase,  I'm working on direct admission for this pt to [facility] (both are full right now).  L 2nd toe osteo, also wound on plantar R 2nd MPJ area with cellulitis.  Doesn't need to be seen today.  Thanks,  -Yousif

## 2020-11-09 NOTE — PROGRESS NOTES
PATIENT HISTORY:  Mauricio Barrera is a 69 year old male who presents to clinic for b/l foot wounds.  Pt reports R foot wound for 5 months, left 2nd toe wound for 3 months.  Reports local redness, swelling, uncertain duration.  Hx of neuropathy.  Nondiabetic.  No recent treatments reported.  No pain.    Review of Systems:  Patient denies fever, chills, rash, wound, stiffness, limping, numbness, weakness, heart burn, blood in stool, chest pain with activity, calf pain when walking, shortness of breath with activity, chronic cough, easy bleeding/bruising, swelling of ankles, excessive thirst, fatigue, depression, anxiety.       PAST MEDICAL HISTORY:   Past Medical History:   Diagnosis Date     Calculus of gallbladder without mention of cholecystitis or obstruction 2006    screened,  neg for AAA,,Crtd stnss,PAD     Combined forms of age-related cataract of left eye      Foot osteomyelitis, right (H)      Hyperlipidemia LDL goal < 130      Hypertension      Incidental lung nodule, > 3mm and < 8mm 02/2004    3.5 mm nodule right lung base - stable by CT x 2 years     Lumbago      Rectal bleeding         PAST SURGICAL HISTORY:   Past Surgical History:   Procedure Laterality Date     AMPUTATE FOOT Right 9/22/2016    Procedure: AMPUTATE FOOT;  Surgeon: Melchor Auguste DPM;  Location: [facility] OR     AMPUTATE FOOT Right 9/24/2016    Procedure: AMPUTATE FOOT;  Surgeon: Venkat Ramos DPM;  Location:  OR     AMPUTATE TOE(S)  5/13/2014    Procedure: AMPUTATE TOE(S);  Surgeon: Venkat Ramos DPM;  Location: [facility]      COLONOSCOPY THRU STOMA, DIAGNOSTIC  3/03    diverticulae,some inflamed,hemorrhoids     HEMORRHOIDECTOMY  4/07    single,anterior     IRRIGATION AND DEBRIDEMENT FOOT, COMBINED Right 9/22/2016    Procedure: COMBINED IRRIGATION AND DEBRIDEMENT FOOT;  Surgeon: Melchor Auguste DPM;  Location:  OR     PHACOEMULSIFICATION CLEAR CORNEA WITH STANDARD INTRAOCULAR LENS IMPLANT Left 10/23/2020    Procedure:  LEFT EYE CATARACT REMOVAL WITH INTRAOCULAR LENS IMPLANT;  Surgeon: Pat King MD;  Location: [facility] OR     PHACOEMULSIFICATION CLEAR CORNEA WITH STANDARD INTRAOCULAR LENS IMPLANT Right 11/6/2020    Procedure: RIGHT EYE CATARACT REMOVAL WITH INTRAOCULAR LENS IMPLANT;  Surgeon: Pat King MD;  Location: [facility] OR     REPAIR HAMMER TOE  5/13/2014    Procedure: REPAIR HAMMER TOE;  Surgeon: Venkat Ramos DPM;  Location:  OR     [facility] NONSPECIFIC PROCEDURE      tonsillectomy     [facility] NONSPECIFIC PROCEDURE      hernia surgery     [facility] NONSPECIFIC PROCEDURE  5/04    reduction torsion left testis        MEDICATIONS:   Current Outpatient Medications:      levofloxacin (LEVAQUIN) 750 MG tablet, Take 1 tablet (750 mg) by mouth daily for 10 days, Disp: 10 tablet, Rfl: 0     metFORMIN (GLUCOPHAGE) 1000 MG tablet, Take 1,000 mg by mouth daily (with breakfast), Disp: , Rfl:      ofloxacin (OCUFLOX) 0.3 % ophthalmic solution, 1 drop 4x daily in the surgical eye for 1 week after surgery, then stop, Disp: 1 Bottle, Rfl: 0     ofloxacin (OCUFLOX) 0.3 % ophthalmic solution, 1 drop 4x daily in the surgical eye for 1 week after surgery, then stop, Disp: 1 Bottle, Rfl: 0     olmesartan (BENICAR) 20 MG tablet, daily, Disp: , Rfl:      prazosin (MINIPRESS) 2 MG capsule, daily, Disp: , Rfl:      prednisoLONE acetate (PRED FORTE) 1 % ophthalmic suspension, 1 drop in surgical eye as directed, 4x daily after surgery for 1 week, 3x daily for 1 week, 2x daily for 1 week, daily for 1 week, then stop, Disp: 1 Bottle, Rfl: 1     prednisoLONE acetate (PRED FORTE) 1 % ophthalmic suspension, 1 drop in surgical eye as directed, 4x daily after surgery for 1 week, 3x daily for 1 week, 2x daily for 1 week, daily for 1 week, then stop, Disp: 1 Bottle, Rfl: 1     simvastatin (ZOCOR) 20 MG tablet, Take 1 tablet (20 mg) by mouth At Bedtime, Disp: 90 tablet, Rfl: 3     ALLERGIES:  No Known Allergies     SOCIAL HISTORY:   Social History      Socioeconomic History     Marital status:      Spouse name: Cathleen     Number of children: 1     Years of education: Not on file     Highest education level: Not on file   Occupational History     Not on file   Social Needs     Financial resource strain: Not on file     Food insecurity     Worry: Not on file     Inability: Not on file     Transportation needs     Medical: Not on file     Non-medical: Not on file   Tobacco Use     Smoking status: Former Smoker     Types: Cigars     Quit date: 2004     Years since quittin.8     Smokeless tobacco: Never Used   Substance and Sexual Activity     Alcohol use: Yes     Comment: rarely      Drug use: No     Sexual activity: Yes     Partners: Female   Lifestyle     Physical activity     Days per week: Not on file     Minutes per session: Not on file     Stress: Not on file   Relationships     Social connections     Talks on phone: Not on file     Gets together: Not on file     Attends [employer] service: Not on file     Active member of club or organization: Not on file     Attends meetings of clubs or organizations: Not on file     Relationship status: Not on file     Intimate partner violence     Fear of current or ex partner: Not on file     Emotionally abused: Not on file     Physically abused: Not on file     Forced sexual activity: Not on file   Other Topics Concern      Service No     Blood Transfusions No     Caffeine Concern No     Occupational Exposure No     Hobby Hazards No     Sleep Concern No     Comment:  irregular sleep      Stress Concern No     Weight Concern Yes     Special Diet Not Asked     Back Care Not Asked     Exercise Yes     Comment: 10 times monthly     Bike Helmet Not Asked     Seat Belt Yes     Self-Exams No     Parent/sibling w/ CABG, MI or angioplasty before 65F 55M? No   Social History Narrative    Balanced Diet - Yes    Osteoporosis Preventative measures-  Dairy servings per day: 2    Regular Exercise -  yes Describe  "walking 4 times per week    Dental Exam up - YES - Date: 3/07    Eye Exam -5/07    Self Testicular Exam -  No    Do you have any concerns about STD's -  No    Abuse: Current or Past (Physical, Sexual or Emotional)- No    Do you feel safe in your environment - Yes    Guns stored in the home - No    Sunscreen used - yes    Seatbelts used - Yes    Lipids - 04/07    Glucose -  4/07    Colon Cancer Screening - Colonoscopy 03-(date completed)    Hemoccults - 02-192003    PSA - 2005    Digital Rectal Exam - 02-    Immunizations reviewed and up to date - yes,utd    Carmenza Chavez MA 2007        FAMILY HISTORY:   Family History   Problem Relation Age of Onset     Cancer Mother         Would not permit any exams or investigation,so dx inferred by family     C.A.D. Father         50's     Respiratory Brother         COPD, smoker     Family History Negative Brother      Glaucoma No family hx of      Macular Degeneration No family hx of         EXAM:Vitals: /81   Ht 1.905 m (6' 3\")   Wt 131.5 kg (290 lb)   BMI 36.25 kg/m    BMI= Body mass index is 36.25 kg/m .    General appearance: Patient is alert and fully cooperative with history & exam.  No sign of distress is noted during the visit.     Psychiatric: Affect is pleasant & appropriate.  Patient appears motivated to improve health.     Respiratory: Breathing is regular & unlabored while sitting.     HEENT: Hearing is intact to spoken word.  Speech is clear.  No gross evidence of visual impairment that would impact ambulation.     Dermatologic: Approx 1cm diameter wound to plantar distal R 2nd MPJ area with exposed fat layer, callused margins, local erythema and edema.  No deep probing.  Approx 1cm diameter wound to tip of L 2nd toe with callused margins, local erythema and edema of distal toe.  This wound probes to hard end point.       Vascular: DP & PT pulses are intact & regular bilaterally.  No significant edema or varicosities noted.  CFT and skin " temperature are normal to both lower extremities.     Neurologic: Lower extremity sensation is absent to light touch b/l.     Musculoskeletal: Prior R 2nd toe partial amputation and R 3rd ray partial amputation.  Patient is ambulatory without assistive device or brace.  No gross ankle deformity noted.  No foot or ankle joint effusion is noted.    XRs of feet reviewed with pt.  R foot w/o evidence of bone erosion.  L 2nd toe distal phalanx with significant erosion suggestive of osteomyelitis.     ASSESSMENT:   L 2nd toe osteomyelitis  B/l foot wounds, cellulitis  Peripheral polyneuropathy     PLAN:  Reviewed patient's chart in epic.  Discussed condition and treatment options including pros and cons.    Advised inpatient admission for osteomyelitis of L 2nd toe.  Pt in need of IV abx, MRIs for further workup.  Discussed he will likely need toe amputation.  Discussed risk of deep infection/surgery/amputation on the R foot as well.      I called for direct admission, [facility] and [facility] are currently full.  They will call back this afternoon with a status update.  Pt informed.  He is agreeable to admission.    Will start Levaquin in the interim for now.  Wound care discussed and written instructions given.    Seek immediate care for worsening redness, drainage, swelling, pain, fever, chills, nausea, vomiting.     Procedure:   After consent, excisional debridement was performed on b/l foot ulcers.  Tissue nipper was used to debride ulcers down to and including subcutaneous tissue. No anesthesia was used due to neuropathy. Dressings applied to feet  Patient tolerated procedure well.    Melchor Auguste DPM, FACFAS

## 2020-11-09 NOTE — TELEPHONE ENCOUNTER
"See my clinic note from today for context.    I received a call from direct admissions and they stated there are no available beds at [facility], or anywhere else in the system.    I asked for this pt to be placed on the waiting list (ideally for [facility]).  Admission later today or tomorrow possible.    I called pt and informed him of the above.  Going to the ED today would be an option, but I was told they too are basically at capacity.  The other option is to wait until later today/tomorrow to see if something opens up.  This is reasonable given pt is stable.  He states his toe has looked like it does for a \"long time.\"  Advised ED for worsening redness, drainage, swelling, pain, fever, chills, nausea, vomiting.    Pt opted to wait for now.  He is on Levquin.  We will keep him posted.    "

## 2020-11-09 NOTE — LETTER
11/9/2020         RE: Mauricio Barrera  [address]  [place] [address]        Dear Colleague,    Thank you for referring your patient, Mauricio Barrera, to the [facility]. Please see a copy of my visit note below.    PATIENT HISTORY:  Mauricio Barrera is a 69 year old male who presents to clinic for b/l foot wounds.  Pt reports R foot wound for 5 months, left 2nd toe wound for 3 months.  Reports local redness, swelling, uncertain duration.  Hx of neuropathy.  Nondiabetic.  No recent treatments reported.  No pain.    Review of Systems:  Patient denies fever, chills, rash, wound, stiffness, limping, numbness, weakness, heart burn, blood in stool, chest pain with activity, calf pain when walking, shortness of breath with activity, chronic cough, easy bleeding/bruising, swelling of ankles, excessive thirst, fatigue, depression, anxiety.       PAST MEDICAL HISTORY:   Past Medical History:   Diagnosis Date     Calculus of gallbladder without mention of cholecystitis or obstruction 2006    screened,  neg for AAA,,Crtd stnss,PAD     Combined forms of age-related cataract of left eye      Foot osteomyelitis, right (H)      Hyperlipidemia LDL goal < 130      Hypertension      Incidental lung nodule, > 3mm and < 8mm 02/2004    3.5 mm nodule right lung base - stable by CT x 2 years     Lumbago      Rectal bleeding         PAST SURGICAL HISTORY:   Past Surgical History:   Procedure Laterality Date     AMPUTATE FOOT Right 9/22/2016    Procedure: AMPUTATE FOOT;  Surgeon: Melchor Auguste DPM;  Location: [facility]     AMPUTATE FOOT Right 9/24/2016    Procedure: AMPUTATE FOOT;  Surgeon: Venkat Ramos DPM;  Location:  OR     AMPUTATE TOE(S)  5/13/2014    Procedure: AMPUTATE TOE(S);  Surgeon: Venkat Ramos DPM;  [facility]      COLONOSCOPY THRU STOMA, DIAGNOSTIC  3/03    diverticulae,some inflamed,hemorrhoids     HEMORRHOIDECTOMY  4/07    single,anterior     IRRIGATION  AND DEBRIDEMENT FOOT, COMBINED Right 9/22/2016    Procedure: COMBINED IRRIGATION AND DEBRIDEMENT FOOT;  Surgeon: Melchor Auguste DPM;  Location: [facility] OR     PHACOEMULSIFICATION CLEAR CORNEA WITH STANDARD INTRAOCULAR LENS IMPLANT Left 10/23/2020    Procedure: LEFT EYE CATARACT REMOVAL WITH INTRAOCULAR LENS IMPLANT;  Surgeon: Pat King MD;  Location: [facility] OR     PHACOEMULSIFICATION CLEAR CORNEA WITH STANDARD INTRAOCULAR LENS IMPLANT Right 11/6/2020    Procedure: RIGHT EYE CATARACT REMOVAL WITH INTRAOCULAR LENS IMPLANT;  Surgeon: Pat King MD;  Location: [facility] OR     REPAIR HAMMER TOE  5/13/2014    Procedure: REPAIR HAMMER TOE;  Surgeon: Venkat Ramos DPM;  Location:  OR     [facility] NONSPECIFIC PROCEDURE      tonsillectomy     [facility] NONSPECIFIC PROCEDURE      hernia surgery     [facility] NONSPECIFIC PROCEDURE  5/04    reduction torsion left testis        MEDICATIONS:   Current Outpatient Medications:      levofloxacin (LEVAQUIN) 750 MG tablet, Take 1 tablet (750 mg) by mouth daily for 10 days, Disp: 10 tablet, Rfl: 0     metFORMIN (GLUCOPHAGE) 1000 MG tablet, Take 1,000 mg by mouth daily (with breakfast), Disp: , Rfl:      ofloxacin (OCUFLOX) 0.3 % ophthalmic solution, 1 drop 4x daily in the surgical eye for 1 week after surgery, then stop, Disp: 1 Bottle, Rfl: 0     ofloxacin (OCUFLOX) 0.3 % ophthalmic solution, 1 drop 4x daily in the surgical eye for 1 week after surgery, then stop, Disp: 1 Bottle, Rfl: 0     olmesartan (BENICAR) 20 MG tablet, daily, Disp: , Rfl:      prazosin (MINIPRESS) 2 MG capsule, daily, Disp: , Rfl:      prednisoLONE acetate (PRED FORTE) 1 % ophthalmic suspension, 1 drop in surgical eye as directed, 4x daily after surgery for 1 week, 3x daily for 1 week, 2x daily for 1 week, daily for 1 week, then stop, Disp: 1 Bottle, Rfl: 1     prednisoLONE acetate (PRED FORTE) 1 % ophthalmic suspension, 1 drop in surgical eye as directed, 4x daily after surgery for 1 week, 3x daily for 1 week,  2x daily for 1 week, daily for 1 week, then stop, Disp: 1 Bottle, Rfl: 1     simvastatin (ZOCOR) 20 MG tablet, Take 1 tablet (20 mg) by mouth At Bedtime, Disp: 90 tablet, Rfl: 3     ALLERGIES:  No Known Allergies     SOCIAL HISTORY:   Social History     Socioeconomic History     Marital status:      Spouse name: Cathleen     Number of children: 1     Years of education: Not on file     Highest education level: Not on file   Occupational History     Not on file   Social Needs     Financial resource strain: Not on file     Food insecurity     Worry: Not on file     Inability: Not on file     Transportation needs     Medical: Not on file     Non-medical: Not on file   Tobacco Use     Smoking status: Former Smoker     Types: Cigars     Quit date: 2004     Years since quittin.8     Smokeless tobacco: Never Used   Substance and Sexual Activity     Alcohol use: Yes     Comment: rarely      Drug use: No     Sexual activity: Yes     Partners: Female   Lifestyle     Physical activity     Days per week: Not on file     Minutes per session: Not on file     Stress: Not on file   Relationships     Social connections     Talks on phone: Not on file     Gets together: Not on file     Attends Faith service: Not on file     Active member of club or organization: Not on file     Attends meetings of clubs or organizations: Not on file     Relationship status: Not on file     Intimate partner violence     Fear of current or ex partner: Not on file     Emotionally abused: Not on file     Physically abused: Not on file     Forced sexual activity: Not on file   Other Topics Concern      Service No     Blood Transfusions No     Caffeine Concern No     Occupational Exposure No     Hobby Hazards No     Sleep Concern No     Comment:  irregular sleep      Stress Concern No     Weight Concern Yes     Special Diet Not Asked     Back Care Not Asked     Exercise Yes     Comment: 10 times monthly     Bike Helmet Not Asked      "Seat Belt Yes     Self-Exams No     Parent/sibling w/ CABG, MI or angioplasty before 65F 55M? No   Social History Narrative    Balanced Diet - Yes    Osteoporosis Preventative measures-  Dairy servings per day: 2    Regular Exercise -  yes Describe walking 4 times per week    Dental Exam up - YES - Date: 3/07    Eye Exam -5/07    Self Testicular Exam -  No    Do you have any concerns about STD's -  No    Abuse: Current or Past (Physical, Sexual or Emotional)- No    Do you feel safe in your environment - Yes    Guns stored in the home - No    Sunscreen used - yes    Seatbelts used - Yes    Lipids - 04/07    Glucose -  4/07    Colon Cancer Screening - Colonoscopy 03-(date completed)    Hemoccults - 02-192003    PSA - 2005    Digital Rectal Exam - 02-    Immunizations reviewed and up to date - yes,utd    Carmenza Chavez MA 2007        FAMILY HISTORY:   Family History   Problem Relation Age of Onset     Cancer Mother         Would not permit any exams or investigation,so dx inferred by family     C.A.D. Father         50's     Respiratory Brother         COPD, smoker     Family History Negative Brother      Glaucoma No family hx of      Macular Degeneration No family hx of         EXAM:Vitals: /81   Ht 1.905 m (6' 3\")   Wt 131.5 kg (290 lb)   BMI 36.25 kg/m    BMI= Body mass index is 36.25 kg/m .    General appearance: Patient is alert and fully cooperative with history & exam.  No sign of distress is noted during the visit.     Psychiatric: Affect is pleasant & appropriate.  Patient appears motivated to improve health.     Respiratory: Breathing is regular & unlabored while sitting.     HEENT: Hearing is intact to spoken word.  Speech is clear.  No gross evidence of visual impairment that would impact ambulation.     Dermatologic: Approx 1cm diameter wound to plantar distal R 2nd MPJ area with exposed fat layer, callused margins, local erythema and edema.  No deep probing.  Approx 1cm diameter wound " to tip of L 2nd toe with callused margins, local erythema and edema of distal toe.  This wound probes to hard end point.       Vascular: DP & PT pulses are intact & regular bilaterally.  No significant edema or varicosities noted.  CFT and skin temperature are normal to both lower extremities.     Neurologic: Lower extremity sensation is absent to light touch b/l.     Musculoskeletal: Prior R 2nd toe partial amputation and R 3rd ray partial amputation.  Patient is ambulatory without assistive device or brace.  No gross ankle deformity noted.  No foot or ankle joint effusion is noted.    XRs of feet reviewed with pt.  R foot w/o evidence of bone erosion.  L 2nd toe distal phalanx with significant erosion suggestive of osteomyelitis.     ASSESSMENT:   L 2nd toe osteomyelitis  B/l foot wounds, cellulitis  Peripheral polyneuropathy     PLAN:  Reviewed patient's chart in epic.  Discussed condition and treatment options including pros and cons.    Advised inpatient admission for osteomyelitis of L 2nd toe.  Pt in need of IV abx, MRIs for further workup.  Discussed he will likely need toe amputation.  Discussed risk of deep infection/surgery/amputation on the R foot as well.      I called for direct admission, [facility] and [facility] are currently full.  They will call back this afternoon with a status update.  Pt informed.  He is agreeable to admission.    Will start Levaquin in the interim for now.  Wound care discussed and written instructions given.    Seek immediate care for worsening redness, drainage, swelling, pain, fever, chills, nausea, vomiting.     Procedure:   After consent, excisional debridement was performed on b/l foot ulcers.  Tissue nipper was used to debride ulcers down to and including subcutaneous tissue. No anesthesia was used due to neuropathy. Dressings applied to feet  Patient tolerated procedure well.    Melchor Auguste, MAVERICK, FACFAS        Again, thank you for allowing me to participate in the care of  your patient.        Sincerely,        Melchor Auguste DPM

## 2020-11-09 NOTE — PATIENT INSTRUCTIONS
Dr. Davila is recommending hospital admission for bone infection.  The hospital will call once a bed is available.        Thank you for choosing [facility]!    DR. DAVILA'S CLINIC LOCATIONS:     [place] SET UP SURGERY: [phone]   [address]   APPOINTMENTS: [phone]   [place] [address] BILLING Q's: [phone]  [phone] TRIAGE RN:  [phone]       [place]    [address]    [place] [address]    [phone]  [phone]        WOUND CARE RECOMMENDATIONS    1)  Keep the wound covered by a bandage when bathing.    2)  Gently clean the wound with soap water, separate from bath/shower water.      3)  Each day, apply a topical antibiotic ointment to the wound (Neosporin, Triple antibiotic, Bacitracin).   Cover with large band-aid or gauze.      4)  Please seek immediate medical attention if any increasing redness, swelling, drainage, smell, or pain related to the wound.           Please read through the following handouts and if you have any questions, please feel free to call us or send a [facility] message!

## 2020-11-10 PROBLEM — M86.9 OSTEOMYELITIS (H): Status: ACTIVE | Noted: 2020-01-01

## 2020-11-10 NOTE — H&P
[facility]    History and Physical  Hospitalist       Date of Admission:  11/10/20  Date of Service (when I saw the patient): 11/10/20    Assessment & Plan   Mauricio Barrera is a very pleasant 69 year old male with a past medical history of right foot osteomyelitis with prior right second toe partial amputation and right third ray partial amputation, diabetes, hypertension, hyperlipidemia, lung nodule, and GI bleed who presents to podiatry clinic on 11/9/2020 to address right foot wound and left second toe wound.  He was found to have osteomyelitis and is directly admitted to [facility] on 11/10 for additional treatment.    L 2nd toe osteomyelitis  B/l foot wounds, cellulitis  Peripheral polyneuropathy  Patient with history of right foot osteomyelitis that required right second toe partial amputation, and right third ray partial amputation.  Cultures from 2016 grew MSSA and group B strep.  Patient now presents to podiatry clinic with left second toe wound and evidence of osteomyelitis on x-ray.  He has a right foot wound that may also be infected.  Podiatry recommended admission to hospital for MRI, likely left second toe amputation, and IV antibiotics.  --Bilateral feet MRI ordered  --Start IV vancomycin and Zosyn  --Podiatry consulted  --Infectious disease consulted  --Consider vascular consult, will wait for imaging and podiatry input.  --Wound cares  --IV fluids  --N.p.o. after midnight  --Labs including CBC, CMP, CRP, ESR ordered.  Blood cultures x2 ordered.  Dramatic COVID-19 swab ordered.    Hypertension  Managed outpatient on olmesartan 20 mg daily, prazosin 2 mg daily  --Continue PTA medications    Hyperlipidemia  --Resume prior to mission simvastatin 20 mg nightly    Impaired fasting glucose  Last hemoglobin A1c 10/20/2020 is 5.6.  Managed outpatient on Metformin.  --Hold Metformin for now  --We will place on daily Accu-Cheks   --Low-dose sliding scale insulin while  hospitalized    Recent cataract surgery  --Resume eyedrops when verified by pharmacy      Asymptomatic COVID-19 screening: We will obtain swab for possible emergent procedure.  Low suspicion.      DVT Prophylaxis: Mechanical with PCD's.  Consider chemoprophylaxis pending podiatry plans.    Code Status: Full Code    Disposition: Inpatient status, anticipate 2+ days of hospitalization.  Can likely return to prior living arrangement.    Popeye Canas    Primary Care Physician   Dr. Leon Hinojosa MD    Chief Complaint   Left second toe osteomyelitis, infected right foot wound    History is obtained from the patient and podiatry note.    History of Present Illness   Mauricio Barrera is a very pleasant 69 year old male with a past medical history of right foot osteomyelitis with prior right second toe partial amputation and right third ray partial amputation, diabetes, hypertension, hyperlipidemia, lung nodule, and GI bleed who presents to podiatry clinic on 11/9/2020 to address right foot wound and left second toe wound.    Patient reports right foot wound for 5 months, left 2nd toe wound for 3 months.  Reports local redness and swelling of uncertain duration.  He states that there was a red bump that formed at the end of his left second toe, and he became concerned about it being infected. Pt has hx of neuropathy, so does not have any sensation to his feet. He has impaired fasting glucose, but does not have diagnosis of diabetes.  No recent treatments reported.  Patient was seen in the podiatry clinic by Dr. Auguste on 11/9/2020.  X-rays of the feet were obtained that showed right foot without evidence of bone erosion.  His left second toe distal phalanx showed evidence of erosion suggestive of osteomyelitis.  Patient was advised for inpatient admission due to concern for osteomyelitis needing IV antibiotics, MRI, and further work-up.  Is discussed that he will likely need toe amputation.  He may need  surgery on the right foot as well.  He was started on Levaquin in the interim.    Patient arrives to the hospital in stable condition.  Vital signs are within normal limits.  He is afebrile.  Lab work including CBC, CMP, ESR, CRP, blood cultures, and COVID-19 swab are pending.  Patient denies any recent fevers, chills, headache, lightheadedness, chest pain, shortness of breath, abdominal pain, nausea, vomiting, diarrhea, dysuria, focal weakness.  He has chronic bilateral lower extremity sensation loss due to his neuropathy.    Past Medical History      Right foot osteomyelitis    Hypertension    Hyperlipidemia    Idiopathic neuropathy, lower extremities    Rectal bleeding    Lumbago    Cataracts    Testicular torsion    Past Surgical History   I have reviewed this patient's surgical history and updated it with pertinent information if needed.  Past Surgical History:   Procedure Laterality Date     AMPUTATE FOOT Right 9/22/2016    Procedure: AMPUTATE FOOT;  Surgeon: Melchor Auguste DPM;  Location: [facility] OR     AMPUTATE FOOT Right 9/24/2016    Procedure: AMPUTATE FOOT;  Surgeon: Venkat Ramos DPM;  Location:  OR     AMPUTATE TOE(S)  5/13/2014    Procedure: AMPUTATE TOE(S);  Surgeon: Venkat Ramos DPM;  [facility]      COLONOSCOPY THRU STOMA, DIAGNOSTIC  3/03    diverticulae,some inflamed,hemorrhoids     HEMORRHOIDECTOMY  4/07    single,anterior     IRRIGATION AND DEBRIDEMENT FOOT, COMBINED Right 9/22/2016    Procedure: COMBINED IRRIGATION AND DEBRIDEMENT FOOT;  Surgeon: Melchor Auguste DPM;  Location: [facility] OR     PHACOEMULSIFICATION CLEAR CORNEA WITH STANDARD INTRAOCULAR LENS IMPLANT Left 10/23/2020    Procedure: LEFT EYE CATARACT REMOVAL WITH INTRAOCULAR LENS IMPLANT;  Surgeon: Pat King MD;  Location: [facility] OR     PHACOEMULSIFICATION CLEAR CORNEA WITH STANDARD INTRAOCULAR LENS IMPLANT Right 11/6/2020    Procedure: RIGHT EYE CATARACT REMOVAL WITH INTRAOCULAR LENS IMPLANT;  Surgeon:  Pat King MD;  Location: [facility]     REPAIR HAMMER TOE  2014    Procedure: REPAIR HAMMER TOE;  Surgeon: Venkat Ramos DPM;  Location:  OR     [facility] NONSPECIFIC PROCEDURE      tonsillectomy     [facility] NONSPECIFIC PROCEDURE      hernia surgery     [facility] NONSPECIFIC PROCEDURE      reduction torsion left testis       Prior to Admission Medications   Prior to Admission Medications   Prescriptions Last Dose Informant Patient Reported? Taking?   levofloxacin (LEVAQUIN) 750 MG tablet   No No   Sig: Take 1 tablet (750 mg) by mouth daily for 10 days   metFORMIN (GLUCOPHAGE) 1000 MG tablet   Yes No   Sig: Take 1,000 mg by mouth daily (with breakfast)   ofloxacin (OCUFLOX) 0.3 % ophthalmic solution   No No   Si drop 4x daily in the surgical eye for 1 week after surgery, then stop   ofloxacin (OCUFLOX) 0.3 % ophthalmic solution   No No   Si drop 4x daily in the surgical eye for 1 week after surgery, then stop   olmesartan (BENICAR) 20 MG tablet   Yes No   Sig: daily   prazosin (MINIPRESS) 2 MG capsule   Yes No   Sig: daily   prednisoLONE acetate (PRED FORTE) 1 % ophthalmic suspension   No No   Si drop in surgical eye as directed, 4x daily after surgery for 1 week, 3x daily for 1 week, 2x daily for 1 week, daily for 1 week, then stop   prednisoLONE acetate (PRED FORTE) 1 % ophthalmic suspension   No No   Si drop in surgical eye as directed, 4x daily after surgery for 1 week, 3x daily for 1 week, 2x daily for 1 week, daily for 1 week, then stop   simvastatin (ZOCOR) 20 MG tablet   No No   Sig: Take 1 tablet (20 mg) by mouth At Bedtime      Facility-Administered Medications: None     Allergies   No Known Allergies    Social History   Patient is a former smoker.  Drinks alcohol occasionally.  No illicit drug use.  He is .    Family History   Mother with cancer.  Father with coronary artery disease in his 50s.  1 sister and 4 brothers, 1 brother who has COPD and is a smoker.    Review of  Systems   The 10 point Review of Systems is negative other than noted in the HPI.    Physical Exam   Temp: 97.4  F (36.3  C) Temp src: Oral BP: 124/81 Pulse: 69   Resp: 18 SpO2: 95 % O2 Device: None (Room air)    Vital Signs with Ranges  Temp:  [97.4  F (36.3  C)] 97.4  F (36.3  C)  Pulse:  [69] 69  Resp:  [18] 18  BP: (124)/(81) 124/81  SpO2:  [95 %] 95 %  0 lbs 0 oz    Constitutional: Pleasant, well-appearing gentleman, lying in bed.  Alert, oriented to self, place, date, situation.  In no apparent distress.  ENT: Normocephalic, without obvious abnormality, atraumatic, lips mucosa and tongue appear moist.  Eyes pupils are equal, round, extra occular movements intact.  Normal sclera.    Neck: Supple  Pulmonary: No increased work of breathing, good air exchange, clear to auscultation bilaterally, no crackles or wheezing.  Cardiovascular: Regular rate and rhythm, normal S1 and S2, and no murmur noted.  GI: Normal bowel sounds, soft, non-distended, non-tender.  Obese.  Skin/Integumen: No erythema extending above his wound dressings.  No other rashes noted.  Skin is warm and dry.  Neuro: CN II-XII grossly intact.  Upper and lower extremities strength, coordination and sensation intact bilaterally.    Psych:  Alert and oriented x 3. Normal affect.  Extremities: Trace lower extremity edema noted to ankles. Dorsal pedal pulses and posterior tibial pulses palpable.  Right and left feet have wound dressings that were placed yesterday by podiatry, these were not removed.    Data   Data reviewed today:  I personally reviewed no images or EKG's today.  Recent Labs   Lab 11/10/20  1136   WBC 10.8   HGB 13.7   MCV 92         POTASSIUM 3.8   CHLORIDE 107   CO2 26   BUN 32*   CR 0.85   ANIONGAP 5   SHEN 9.2   *   ALBUMIN 3.4   PROTTOTAL 7.6   BILITOTAL 0.4   ALKPHOS 59   ALT 28   AST 25     Blood cultures pending.    Most Recent ESR & CRP:  Recent Labs   Lab Test 11/10/20  1136   SED 12   CRP 3.5

## 2020-11-10 NOTE — PHARMACY-ADMISSION MEDICATION HISTORY
Pharmacy Medication History  Admission medication history interview status for the 11/10/2020  admission is complete. See EPIC admission navigator for prior to admission medications       Medication history sources: Patient  Location of interview: phone  Medication history source reliability: Poor  Adherence assessment: Moderate    Significant changes made to the medication list:  Changed metformin to 500mg bid from 1000mg daily  Changed olmesartan to bid and prazosin to bid  Updated eye drop orders to current schedule      Additional medication history information:   Pt gets his BP meds from [place] and they have different names than what is listed, but he states that he researched it and they are similar to the olmesartan and prazosin doses/frequencies that are listed. He doesn't have the supply here to see the names    Medication reconciliation completed by provider prior to medication history? Yes    Time spent in this activity: 25 minutes      Prior to Admission medications    Medication Sig Last Dose Taking? Auth Provider   levofloxacin (LEVAQUIN) 750 MG tablet Take 1 tablet (750 mg) by mouth daily for 10 days 11/9/2020 at Unknown time Yes Melchor Auguste DPM   metFORMIN (GLUCOPHAGE) 500 MG tablet Take 500 mg by mouth 2 times daily (with meals) 11/9/2020 at pm Yes Unknown, Entered By History   ofloxacin (OCUFLOX) 0.3 % ophthalmic solution 1 drop 4x daily in the surgical eye for 1 week after surgery, then stop  Patient taking differently: Place 1 drop into the right eye 4 times daily 1 drop 4x daily in the surgical eye for 1 week after surgery, then stop 11/10/2020 at am X1 Yes Pat King MD   olmesartan (BENICAR) 20 MG tablet Take 20 mg by mouth 2 times daily  11/9/2020 at pm Yes Reported, Patient   prazosin (MINIPRESS) 2 MG capsule Take 2 mg by mouth 2 times daily  11/9/2020 at pm Yes Reported, Patient   prednisoLONE acetate (PRED FORTE) 1 % ophthalmic suspension 1 drop in surgical eye as directed,  4x daily after surgery for 1 week, 3x daily for 1 week, 2x daily for 1 week, daily for 1 week, then stop 11/10/2020 at am X1 Yes Pat King MD   prednisoLONE acetate (PRED FORTE) 1 % ophthalmic suspension 1 drop in surgical eye as directed, 4x daily after surgery for 1 week, 3x daily for 1 week, 2x daily for 1 week, daily for 1 week, then stop 11/10/2020 at am X1  Yes Pat King MD   simvastatin (ZOCOR) 20 MG tablet Take 1 tablet (20 mg) by mouth At Bedtime 11/9/2020 at pm Yes Leon Hinojosa MD

## 2020-11-10 NOTE — PROGRESS NOTES
Foot & Ankle Surgery  November 10, 2020    Attempted to see patient this PM, down in MRI.    Per admission from Dr Auguste's clinic, wound distal L 2nd toe with xrays concerning for osteomyelitis, as well as right foot wound.  Sp previous partial 3rd ray amputation.      xrays reviewed.  Will follow up tomorrow on MRI results and to discuss treatment options with patient.  Anticipate surgery L 2nd toe, at minimum.    Jase Rosales DPM FACFAS FACFAOM  Podiatric Foot & Ankle Surgeon  [facility]  [phone]

## 2020-11-10 NOTE — PHARMACY-VANCOMYCIN DOSING SERVICE
Pharmacy Vancomycin Initial Note  Date of Service November 10, 2020  Patient's  1950  69 year old, male    Indication: Osteomyelitis    Current estimated CrCl = CrCl cannot be calculated (Patient's most recent lab result is older than the maximum 10 days allowed.).    Creatinine for last 3 days  No results found for requested labs within last 72 hours.    Recent Vancomycin Level(s) for last 3 days  No results found for requested labs within last 72 hours.      Vancomycin IV Administrations (past 72 hours)      No vancomycin orders with administrations in past 72 hours.                Nephrotoxins and other renal medications (From now, onward)    Start     Dose/Rate Route Frequency Ordered Stop    11/10/20 1200  vancomycin 2000 mg in 0.9% NaCl 500 ml intermittent infusion 2,000 mg      2,000 mg  over 90 Minutes Intravenous ONCE 11/10/20 1132      11/10/20 1131  vancomycin place walker - receiving intermittent dosing      1 each Intravenous SEE ADMIN INSTRUCTIONS 11/10/20 1132      11/10/20 1100  piperacillin-tazobactam (ZOSYN) 3.375 g vial to attach to  mL bag      3.375 g  over 30 Minutes Intravenous ONCE 11/10/20 1055            Contrast Orders - past 72 hours (72h ago, onward)    None                Plan:  1.  Start vancomycin  2000 mg IV X1 dose, then will follow up with renal function results when labs available to determine frequency.   2.  Goal Trough Level: 15-20 mg/L   3.  Pharmacy will check trough levels as appropriate in 1-3 Days.    4. Serum creatinine levels will be ordered daily for the first week of therapy and at least twice weekly for subsequent weeks.    5. [place] method utilized to dose vancomycin therapy: Method 1    Leonela Hernandez [facility]

## 2020-11-11 NOTE — CONSULTS
[facility] Nurse Inpatient Wound Assessment   Reason for consultation: Evaluate and treat left second toe wound and right foot cellulitis wound, podiatry taking the lead and ordered MRIs and will take him to surgery for amputation of left 2nd toe.    Assessment  Right plantar foot and left 3nd toe wounds due to Friction and Mixed Etiology: pressure, nueropathy, osteo in the left second toe due to floppy nature of the digit and confirm by patient  Status: initial assessment  Chronic nonhealing wounds that the patient has been dressing himself over the years. He lives in [facility] for most of the year except this year so follow up medical treatment has not been consistent.  Treatment Plan  Right plantar and left second toe wounds: Daily    Cleanse wound with wound cleanser  Apply skin prep and let it dry  Apply iodosorb gel to the wounds  Apply polymem adhesive foam   Orders Written  Recommended provider order: recommend that he follow up with Dr Chase [facility].   [facility] Nurse follow-up plan:weekly  Nursing to notify the Provider(s) and re-consult the [facility] Nurse if wound(s) deteriorates or new skin concern.    Patient History  According to provider note(s):  Mauricoi Barrera is a very pleasant 69 year old male with a past medical history of right foot osteomyelitis with prior right second toe partial amputation and right third ray partial amputation, diabetes, hypertension, hyperlipidemia, lung nodule, and GI bleed who presents to podiatry clinic on 11/9/2020 to address right foot wound and left second toe wound.  He was found to have osteomyelitis and is directly admitted to [facility] on 11/10 for additional treatment.     L 2nd toe osteomyelitis  B/l foot wounds, cellulitis  Peripheral polyneuropathy  Patient with history of right foot osteomyelitis that required right second toe partial amputation, and right third ray partial amputation.  Cultures from 2016 grew MSSA and group B strep.  Patient now presents to  podiatry clinic with left second toe wound and evidence of osteomyelitis on x-ray.  He has a right foot wound that may also be infected.  Podiatry recommended admission to hospital for MRI, likely left second toe amputation, and IV antibiotics.  --Bilateral feet MRI ordered  --Start IV vancomycin and Zosyn  --Podiatry consulted  --Infectious disease consulted  --Consider vascular consult, will wait for imaging and podiatry input.  --Wound cares    Objective Data  Containment of urine/stool: Continent of bladder and Continent of bowel    Active Diet Order  Orders Placed This Encounter      NPO per Anesthesia Guidelines for Procedure/Surgery Except for: Meds, Ice Chips      Output:   I/O last 3 completed shifts:  In: 480 [P.O.:480]  Out: -     Risk Assessment:   Sensory Perception: 4-->no impairment  Moisture: 4-->rarely moist  Activity: 3-->walks occasionally  Mobility: 3-->slightly limited  Nutrition: 4-->excellent  Friction and Shear: 3-->no apparent problem  Hermilo Score: 21                          Labs:   Recent Labs   Lab 11/11/20  0604 11/10/20  1136   ALBUMIN  --  3.4   HGB 13.1* 13.7   WBC 8.8 10.8   CRP  --  3.5       Physical Exam  Areas of skin assessed: focused on bilateral foot wounds         Wound Location:  Left second toe   Date of last photo 11/11/2020  Wound History: chronic stubbing and callous build up and then reopened    Wound Base: 100% % bone and dry drainage     Palpation of the wound bed: firm      Drainage: dried drainage on wound      Description of drainage: bloody     Measurements (length x width x depth, in cm) 0.5  x 0.3  x  0.1 cm      Tunneling N/A     Undermining N/A  Periwound skin: dry/scaly, edematous and erythemic all the way around      Color: red      Temperature: warm  Odor: none  Pain: absent, insensate          Wound Location:  Right plantar foot 2nd metatarsal  Date of last photo 11/11/2020  Wound History: chronic neuropathic wound unable to feel it and he reports he has  been doctoring it up at home.   Wound Base: 100 % non-granular tissue     Palpation of the wound bed: firm      Drainage: none     Description of drainage: none     Measurements (length x width x depth, in cm) 0.6  x 0.4  x  0.3 cm      Tunneling N/A     Undermining N/A  Periwound skin: hyperkeratosis      Color: normal and consistent with surrounding tissue      Temperature: cool  Odor: none    Interventions  Visual inspection and assessment completed 11/11/2020  Wound Care Rationale Promote moist wound healing without tissue dehydration , Provide selective debridement (autolysis) of nonviable tissue , Provide protection  and Decrease bacterial load  Wound Care: done per plan of care  Supplies: placed at the bedside and discussed with patient  Current off-loading measures: Pillows  Current support surface: Standard  Atmos Air mattress  Education provided to: plan of care, Infection prevention  and Off-loading pressure, after care recommend that if he wants to follow up with Dr Chase at the [facility].   Discussed plan of care with Patient    Olivia Swanson RN

## 2020-11-11 NOTE — CONSULTS
Foot & Ankle Surgery  November 11, 2020    CC: bilateral diabetic foot ulcers    I was asked to see Mauricio Barrera regarding the chief complaint by:  Dr. CALLIE Canas    HPI:  Pt is a 69 year old male who presents with above complaint.  Admission from Dr Holly's clinic for left 2nd toe ulcer, but he also has a right foot wound . Diabetic, previous R foot partial 3rd ray and partial 2nd toe amputations.  He states he's had a plantar R forefoot wound for 5 months, and after draining fluid, it's been progressively improving.  However, the distal L 2nd toe wound has been problematic.  xrys with dr holly concerning for distal phalanx osteomyelitis L 2nd toe.  Admission for further work-up and addressing infection.  MRIs bilateral yesterday indicate osteomyelitis of the L 2nd distal phalanx, but no right foot osteomyelitis/abscess.  Lives most of the time in [place], has a home there, typically ambulates barefoot on his granite floors.  A1c 10/20/20 5.6      ROS:   Pos for CC.  The patient denies current nausea, vomiting, chills, fevers, belly pain, calf pain, chest pain or SOB.  Complete remainder of ROS is otherwise neg.    VITALS:    Vitals:    11/10/20 2257 11/11/20 0607 11/11/20 0615 11/11/20 0809   BP: (!) 140/83  (!) 141/93 (!) 148/92   BP Location:   Right arm Right arm   Pulse: 68  64 59   Resp: 18  18 16   Temp: 98.1  F (36.7  C)  96.3  F (35.7  C) 96.8  F (36  C)   TempSrc: Oral  Oral Oral   SpO2: 94%   96%   Weight:  135.7 kg (299 lb 3.2 oz)     Height:           PMH:    Past Medical History:   Diagnosis Date     Calculus of gallbladder without mention of cholecystitis or obstruction 2006    screened,  neg for AAA,,Crtd stnss,PAD     Combined forms of age-related cataract of left eye      Foot osteomyelitis, right (H)      Hyperlipidemia LDL goal < 130      Hypertension      Incidental lung nodule, > 3mm and < 8mm 02/2004    3.5 mm nodule right lung base - stable by CT x 2 years     Lumbago      Rectal  bleeding        SXHX:    Past Surgical History:   Procedure Laterality Date     AMPUTATE FOOT Right 9/22/2016    Procedure: AMPUTATE FOOT;  Surgeon: Melchor Auguste DPM;  Location: [facility] OR     AMPUTATE FOOT Right 9/24/2016    Procedure: AMPUTATE FOOT;  Surgeon: Venkat Ramos DPM;  Location:  OR     AMPUTATE TOE(S)  5/13/2014    Procedure: AMPUTATE TOE(S);  Surgeon: Venkat Ramos DPM;  Location: [facility] OR      COLONOSCOPY THRU STOMA, DIAGNOSTIC  3/03    diverticulae,some inflamed,hemorrhoids     HEMORRHOIDECTOMY  4/07    single,anterior     IRRIGATION AND DEBRIDEMENT FOOT, COMBINED Right 9/22/2016    Procedure: COMBINED IRRIGATION AND DEBRIDEMENT FOOT;  Surgeon: Melchor Auguste DPM;  Location: [facility] OR     PHACOEMULSIFICATION CLEAR CORNEA WITH STANDARD INTRAOCULAR LENS IMPLANT Left 10/23/2020    Procedure: LEFT EYE CATARACT REMOVAL WITH INTRAOCULAR LENS IMPLANT;  Surgeon: Pat King MD;  Location: [facility] OR     PHACOEMULSIFICATION CLEAR CORNEA WITH STANDARD INTRAOCULAR LENS IMPLANT Right 11/6/2020    Procedure: RIGHT EYE CATARACT REMOVAL WITH INTRAOCULAR LENS IMPLANT;  Surgeon: Pat King MD;  Location: [facility] OR     REPAIR HAMMER TOE  5/13/2014    Procedure: REPAIR HAMMER TOE;  Surgeon: Venkat Ramos DPM;  Location:  OR     [facility] NONSPECIFIC PROCEDURE      tonsillectomy     [facility] NONSPECIFIC PROCEDURE      hernia surgery     [facility] NONSPECIFIC PROCEDURE  5/04    reduction torsion left testis        MEDS:    Current Facility-Administered Medications   Medication     acetaminophen (TYLENOL) Suppository 650 mg     acetaminophen (TYLENOL) tablet 650 mg     calcium carbonate (TUMS) chewable tablet 1,000 mg     glucose gel 15-30 g    Or     dextrose 50 % injection 25-50 mL    Or     glucagon injection 1 mg     HYDROmorphone (PF) (DILAUDID) injection 0.2 mg     insulin aspart (NovoLOG) injection (RAPID ACTING)     insulin aspart (NovoLOG) injection (RAPID ACTING)     lidocaine (LMX4) cream      lidocaine 1 % 0.1-1 mL     losartan (COZAAR) half-tab 12.5 mg     melatonin tablet 1 mg     naloxone (NARCAN) injection 0.1-0.4 mg     ofloxacin (OCUFLOX) 0.3 % ophthalmic solution 1 drop     ondansetron (ZOFRAN-ODT) ODT tab 4 mg    Or     ondansetron (ZOFRAN) injection 4 mg     oxyCODONE (ROXICODONE) tablet 5-10 mg     piperacillin-tazobactam (ZOSYN) 3.375 g vial to attach to  mL bag     polyethylene glycol (MIRALAX) Packet 17 g     prazosin (MINIPRESS) capsule 2 mg     prednisoLONE acetate (PRED FORTE) 1 % ophthalmic susp 1 drop     [START ON 11/14/2020] prednisoLONE acetate (PRED FORTE) 1 % ophthalmic susp 1 drop     prednisoLONE acetate (PRED FORTE) 1 % ophthalmic susp 1 drop    Followed by     [START ON 11/14/2020] prednisoLONE acetate (PRED FORTE) 1 % ophthalmic susp 1 drop    Followed by     [START ON 11/21/2020] prednisoLONE acetate (PRED FORTE) 1 % ophthalmic susp 1 drop    Followed by     [START ON 11/28/2020] prednisoLONE acetate (PRED FORTE) 1 % ophthalmic susp 1 drop     prochlorperazine (COMPAZINE) injection 5 mg    Or     prochlorperazine (COMPAZINE) tablet 5 mg    Or     prochlorperazine (COMPAZINE) suppository 12.5 mg     senna-docusate (SENOKOT-S/PERICOLACE) 8.6-50 MG per tablet 1 tablet    Or     senna-docusate (SENOKOT-S/PERICOLACE) 8.6-50 MG per tablet 2 tablet     simvastatin (ZOCOR) tablet 20 mg     sodium chloride (PF) 0.9% PF flush 3 mL     sodium chloride (PF) 0.9% PF flush 3 mL     sodium chloride 0.9% infusion       ALL:   No Known Allergies    FMH:    Family History   Problem Relation Age of Onset     Cancer Mother         Would not permit any exams or investigation,so dx inferred by family     C.A.D. Father         50's     Respiratory Brother         COPD, smoker     Family History Negative Brother      Glaucoma No family hx of      Macular Degeneration No family hx of        SocHx:    Social History     Socioeconomic History     Marital status:      Spouse name: Cathleen      Number of children: 1     Years of education: Not on file     Highest education level: Not on file   Occupational History     Not on file   Social Needs     Financial resource strain: Not on file     Food insecurity     Worry: Not on file     Inability: Not on file     Transportation needs     Medical: Not on file     Non-medical: Not on file   Tobacco Use     Smoking status: Former Smoker     Types: Cigars     Quit date: 2004     Years since quittin.8     Smokeless tobacco: Never Used   Substance and Sexual Activity     Alcohol use: Yes     Comment: rarely      Drug use: No     Sexual activity: Yes     Partners: Female   Lifestyle     Physical activity     Days per week: Not on file     Minutes per session: Not on file     Stress: Not on file   Relationships     Social connections     Talks on phone: Not on file     Gets together: Not on file     Attends Rastafari service: Not on file     Active member of club or organization: Not on file     Attends meetings of clubs or organizations: Not on file     Relationship status: Not on file     Intimate partner violence     Fear of current or ex partner: Not on file     Emotionally abused: Not on file     Physically abused: Not on file     Forced sexual activity: Not on file   Other Topics Concern      Service No     Blood Transfusions No     Caffeine Concern No     Occupational Exposure No     Hobby Hazards No     Sleep Concern No     Comment:  irregular sleep      Stress Concern No     Weight Concern Yes     Special Diet Not Asked     Back Care Not Asked     Exercise Yes     Comment: 10 times monthly     Bike Helmet Not Asked     Seat Belt Yes     Self-Exams No     Parent/sibling w/ CABG, MI or angioplasty before 65F 55M? No   Social History Narrative    Balanced Diet - Yes    Osteoporosis Preventative measures-  Dairy servings per day: 2    Regular Exercise -  yes Describe walking 4 times per week    Dental Exam up - YES - Date: 3/07    Eye Exam -     Self Testicular Exam -  No    Do you have any concerns about STD's -  No    Abuse: Current or Past (Physical, Sexual or Emotional)- No    Do you feel safe in your environment - Yes    Guns stored in the home - No    Sunscreen used - yes    Seatbelts used - Yes    Lipids - 04/07    Glucose -  4/07    Colon Cancer Screening - Colonoscopy 03-(date completed)    Hemoccults - 02-192003    PSA - 2005    Digital Rectal Exam - 02-    Immunizations reviewed and up to date - yes,utd    Carmenza Chavez MA 2007           EXAMINATION:  Gen:   No apparent distress  Neuro:   A&Ox3, no deficits  Psych:    Answering questions appropriately for age and situation with normal affect  Head:    NCAT  Eye:    Visual scanning without deficit  Ear:    Response to auditory stimuli wnl  Lung:    Non-labored breathing on RA noted  Abd:    NTND per patient report  Lymph:    Neg for pitting/non-pitting edema BLE  Vasc:    Pulses palpable, CFT minimally delayed  Neuro:    Light touch sensation greatly diminished distally  Derm:    Right lower extremity- full-thickness ulcer sub 2nd MPJ without exposed bone/tendon/joint and without purulent draingae or local SOI.  Left lower extremity - distal 2nd toe ulcer, probes to bone, with surrounding erythema  MSK:    Right lower extremity - partial 3rd ray and partial 2nd toe amputations.  Hammertoes bilateral   Calf:    Neg for redness, swelling or tenderness      Imaging:  xrays bilateral foot - IMPRESSION:   1. Left: Chronic arthropathy versus surgical change at the third PIP  joint. On the lateral view, there is destructive change involving the  distal aspect of the second distal phalanx. In light of the overlying  ulcer, this is felt to represent osteomyelitis.  2. Right: Again there are changes of second and third toe amputation.  No acute focal bone destruction identified.    MR left foot - IMPRESSION:  1.  Acute osteomyelitis of the second distal phalanx with erosion/bone  loss at the  tuft.  2.  Overlying skin ulcer with mild cellulitis. No abscess.  3.  Diffuse muscle atrophy in keeping with diabetic neuropathy.    MR right foot - IMPRESSION:  1.  No evidence of acute osteomyelitis.  2.  Cellulitis involving the second toe without abscess.  3.  Postsurgical changes of the second and third toes.  4.  Diffuse muscle atrophy in keeping with diabetic neuropathy.    Labs:    Component      Latest Ref Rng & Units 10/20/2020 11/10/2020 11/11/2020   Sodium      133 - 144 mmol/L 136     Potassium      3.4 - 5.3 mmol/L 3.9     Chloride      94 - 109 mmol/L 104     Carbon Dioxide      20 - 32 mmol/L 25     Anion Gap      3 - 14 mmol/L 7     Glucose      70 - 99 mg/dL 116 (H)     Urea Nitrogen      7 - 30 mg/dL 22     Creatinine      0.66 - 1.25 mg/dL 0.82     GFR Estimate      >60 mL/min/1.73:m2 88     GFR Estimate If Black      >60 mL/min/1.73:m2 >90     Calcium      8.5 - 10.1 mg/dL 9.5     Bilirubin Total      0.2 - 1.3 mg/dL 0.4     Albumin      3.4 - 5.0 g/dL 3.6     Protein Total      6.8 - 8.8 g/dL 8.2     Alkaline Phosphatase      40 - 150 U/L 51     ALT      0 - 70 U/L 32     AST      0 - 45 U/L 28     WBC      4.0 - 11.0 10e9/L   8.8   RBC Count      4.4 - 5.9 10e12/L   4.45   Hemoglobin      13.3 - 17.7 g/dL   13.1 (L)   Hematocrit      40.0 - 53.0 %   40.7   MCV      78 - 100 fl   92   MCH      26.5 - 33.0 pg   29.4   MCHC      31.5 - 36.5 g/dL   32.2   RDW      10.0 - 15.0 %   12.9   Platelet Count      150 - 450 10e9/L   318   Creatinine Urine      mg/dL 172     Albumin Urine mg/L      mg/L 10     Albumin Urine mg/g Cr      0 - 17 mg/g Cr 6.05     Hemoglobin A1C      0 - 5.6 % 5.6     CRP Inflammation      0.0 - 8.0 mg/L  3.5    Sed Rate      0 - 20 mm/h  12      Assessment:  69 year old male with diabetic right foot ulcer, stable, and diabetic left 2nd toe ulcer with cellulitis and distal phalanx osteomyelitis       Plan:  Discussed etiologies, anatomy and options  1.  diabetic right foot  ulcer, stable, and diabetic left 2nd toe ulcer with cellulitis and distal phalanx osteomyelitis  -personally reviewed xray and MRI results  -personally reviewed imaging  -discussed treatment options with patient.  Advised partial L 2nd toe amputation to eliminate chronic wound and bone infection.  Regarding right foot, discussed option of flap closure of wound. However, as he states the wound is healing with local cares, and is otherwise stable, he will simply monitor for now  -NPO midnight, ok to eat now.  Surgery 11/12/20 0730 for partial L 2nd toe amputation.  Anticipate likely ok for discharge Friday if surgical wound is stable during dressing change    Patient's medical history was reviewed today      Jase Rosales DPM FACFAS FACFAOM  Podiatric Foot & Ankle Surgeon  [facility]  [phone]

## 2020-11-11 NOTE — PLAN OF CARE
A&Ox4. Up independent. regular diet. VSS ex slight hypertension. Denies pain.INS @ 75 ml/hr. Right foot bandage with dried mostly serous but serosanguineous drainage. Left food dressing slight drainage noted but patient declined having that one changed. Patient to have potential surgery in AM, called hospitalist, NPO status in place at midnight. No other concerns. Discharge pending surgical intervention.

## 2020-11-11 NOTE — CONSULTS
ID consult dictated IMP 1 70 yo male with chronic neuropathy, ? No DM(although borderline HgbAiC), now L 2nd toe osteo/wd, no sepsis, chronic R foot issue no osteo or celulitis    REc Has had renal issues in past unlikely MRSa discontinue vanco, zosyn alone, await cx/findings and adjust, likely po, augmentin empiric if cx pending  '

## 2020-11-11 NOTE — CONSULTS
Consult Date:  11/11/2020      INFECTIOUS DISEASE CONSULTATION      LOCATION:  Room 833, [facility]      REQUESTING PHYSICIAN:  ALONZO Blakely      IMPRESSION:   1.  A 69-year-old male with acute and subacute worsening, left second toe, with osteomyelitis on imaging, not major cellulitis and no clinical sepsis.   2.  History of bilateral foot wound issues, including right foot amputations, current abnormal right foot without clear deep infection or cellulitis.   3.  Chronic peripheral neuropathy, idiopathic, variously listed as diabetic; he says he is not.  Hemoglobin A1c's have been borderline elevated, not being treated as diabetic.   4.  Hypertension and hyperlipidemia.   5.  Negative COVID-19.      RECOMMENDATIONS:   1.  Has had historically some renal issues, even though normal renal function.  Currently not worth vancomycin toxicity; discontinue for now.  Will be on board anyway.  Continue Zosyn.   2.  Postop, if obvious major infection resected while awaiting info, probably okay to discharge on empiric Augmentin for roughly 2 weeks and readjust if cultures grow something.  If here longer, continue IV and we will adjust cultures.      HISTORY:  This 69-year-old male is seen in consultation due to osteomyelitis of the left second toe.  The patient has a history of chronic neuropathy.  He says he does not have diabetes, but is on metformin and has had hemoglobin A1c's that have been elevated.  The patient has not had major vascular issues historically.  He had right foot distal amputations in 2016 and has had periodic ulceration issues since, particularly in the left second toe, which is anatomically quite abnormal.  He recently for about 3 months has had an ongoing ulcer that has not healed and now imaging, including plain film, suggested osteo, as does the MRI.  The patient is not having fevers, chills or sweats and has really not had significant infection at the site that has been  requiring antibiotics.  Was started on Levaquin preop and admitted.      PAST MEDICAL HISTORY:  Probable mild diabetes, despite denial, ongoing peripheral neuropathy, long-term, even preceding that, history of right foot ulcerations and prior osteo on the right.      SOCIAL AND FAMILY HISTORY:  No recent travels or exposures.  No COVID-19 exposures and negative.      MEDICATIONS:  As listed.      REVIEW OF SYSTEMS:  No significant systemic symptoms.  No major pain in the foot itself and overall feels well.      PHYSICAL EXAMINATION:   GENERAL:  The patient appears his stated age.  He does not look toxic or ill.   VITAL SIGNS:  Currently normal, including being afebrile.   HEENT:  No thrush or intraoral lesions.  Pupils reactive.   HEART:  Regular rhythm.   LUNGS:  Clear.   ABDOMEN:  Nontender.   EXTREMITIES:  Quite abnormal-looking left second toe and distal foot.  Mild redness, but not particularly impressive.  Right foot also anatomically abnormal, prior amputations, but no clear major infection.  Minor ulcer present.      LABORATORY DATA:  Blood cultures pending.  Creatinine within normal limits.  White count normal at 8800.  Sed rate 12, CRP normal.      Thank you very much for this consultation.  I will follow the patient with you.         NATALIA BARBOSA MD             D: 2020   T: 2020   MT: KESHAV      Name:     JENARO LORA   MRN:      [id number]        Account:       [id number]   :      1950           Consult Date:  2020      Document: [id number]

## 2020-11-11 NOTE — PLAN OF CARE
A&O,  VSS on RA, Calm and cooperative, Continent x 2, Independent in transfers, denies pain, on Regular diet, does his own eye drops, NPO @ [facility] for procedure with podiatry camilo mid morning.

## 2020-11-11 NOTE — PROGRESS NOTES
[facility] [place]    Hospitalist Progress Note    Assessment & Plan   Mauricio Barrera is a very pleasant 69 year old male with a past medical history of right foot osteomyelitis with prior right second toe partial amputation and right third ray partial amputation, diabetes, hypertension, hyperlipidemia, lung nodule, and GI bleed who presents to podiatry clinic on 11/9/2020 to address right foot wound and left second toe wound.  He was found to have osteomyelitis and is directly admitted to [facility] on 11/10 for additional treatment.     L 2nd toe osteomyelitis  B/l foot wounds, cellulitis  Peripheral polyneuropathy  Patient with history of right foot osteomyelitis that required right second toe partial amputation, and right third ray partial amputation.  Cultures from 2016 grew MSSA and group B strep.  Patient now presents to podiatry clinic with left second toe wound and evidence of osteomyelitis on x-ray.  He has a right foot wound that may also be infected.  Podiatry recommended admission to hospital for MRI, likely left second toe amputation, and IV antibiotics.  -MRI L foot: .    1. Acute osteomyelitis of the second distal phalanx with erosion/bone  loss at the tuft.  2.  Overlying skin ulcer with mild cellulitis. No abscess.  3.  Diffuse muscle atrophy in keeping with diabetic neuropathy.     MRI R foot:   1.  No evidence of acute osteomyelitis.  2.  Cellulitis involving the second toe without abscess.  3.  Postsurgical changes of the second and third toes.  4.  Diffuse muscle atrophy in keeping with diabetic neuropathy.     -crp 3.5.   -bld cx ngtd  -BLE neuropathy  -R foot wound over callus dorsum of foot, at 2nd metatarsal, associated callus. Unclear if infected. Mri without osteomyelitis of site. not followed by wound care clinic, just back from [place]      Plan;   -wound care RN for R foot wound  - emperic vanco on admission, stopped byID  -Zosyn  --Podiatry consulted  --Infectious disease  consulted  --Consider vascular consult, will wait for imaging and podiatry input. Call out to podiatry team.   --Wound cares  --IV fluids  --N.p.o. after midnight, surgery tomorrow L 2nd toe amputation  -per wound care RN,  recommend that [facility] wound care orders placed.          Hypertension  Managed outpatient on olmesartan 20 mg daily, prazosin 2 mg daily  -adequate control  --Continue PTA medications-losartan , prazosin     Hyperlipidemia  --Resume prior to mission simvastatin 20 mg nightly     Impaired fasting glucose  Last hemoglobin A1c 10/20/2020 is 5.6.  Managed outpatient on Metformin.  - range, at goal .  Plan;   --Hold Metformin for now  --We will place on daily Accu-Cheks   --Low-dose sliding scale insulin while hospitalized     Recent cataract surgery  --Resume eyedrops when verified by pharmacy        Asymptomatic COVID-19 screening:  Negative covid pcr swab testing.   Low suspicion.        DVT Prophylaxis: Mechanical with PCD's.  Consider chemoprophylaxis pending podiatry plans.     Code Status: Full Code     Disposition: Inpatient status, anticipate 2+ days of hospitalization.  Can likely return to prior living arrangement.  Sherman Millan MD  Text Page  (7am to 6pm)  Interval History   Possible L 2nd toe amputation today. Npo.   Not followed in wound clinic  Back from [facility] in October  R 2nd toe wound for 5 months  BLE  Neuropathy  No claudication. No rxn to gen anesthesia.   No cp,sob. No complaints.       -Data reviewed today: I reviewed all new labs and imaging results over the last 24 hours. I personally reviewed imaging and labs since admission    Physical Exam   Temp: 96.3  F (35.7  C) Temp src: Oral BP: (!) 141/93 Pulse: 64   Resp: 18 SpO2: 94 % O2 Device: None (Room air)    Vitals:    11/10/20 1100 11/11/20 0607   Weight: 131.5 kg (290 lb) 135.7 kg (299 lb 3.2 oz)     Vital Signs with Ranges  Temp:  [95.7  F (35.4  C)-98.1  F  (36.7  C)] 96.3  F (35.7  C)  Pulse:  [64-69] 64  Resp:  [18] 18  BP: (124-161)/(81-94) 141/93  SpO2:  [94 %-95 %] 94 %  I/O last 3 completed shifts:  In: 480 [P.O.:480]  Out: -     Constitutional: nad  Respiratory: CTAB  Cardiovascular: RRR no r/g/m, warm BLE extrem, no cyanosis. Good pedal pulses and cap refill  GI: soft, nt, nd  Skin/Integumen: L 2nd toe bandaged. R 2nd metatarsal: 1cm ulcer associated with callus, NT, no purulence or surrounding erythema.   Psych/neuro; nl speech and mentation.     Medications     sodium chloride 75 mL/hr at 11/11/20 0453       insulin aspart  1-3 Units Subcutaneous TID AC     insulin aspart  1-3 Units Subcutaneous At Bedtime     losartan  12.5 mg Oral Daily     ofloxacin  1 drop Right Eye 4x Daily     piperacillin-tazobactam  3.375 g Intravenous Q6H     prazosin  2 mg Oral BID     prednisoLONE acetate  1 drop Left Eye BID     [START ON 11/14/2020] prednisoLONE acetate  1 drop Left Eye Daily     prednisoLONE acetate  1 drop Right Eye 4x Daily    Followed by     [START ON 11/14/2020] prednisoLONE acetate  1 drop Right Eye TID    Followed by     [START ON 11/21/2020] prednisoLONE acetate  1 drop Right Eye BID    Followed by     [START ON 11/28/2020] prednisoLONE acetate  1 drop Right Eye Daily     simvastatin  20 mg Oral At Bedtime     sodium chloride (PF)  3 mL Intracatheter Q8H     vancomycin (VANCOCIN) IV  2,000 mg Intravenous Q12H       Data   Recent Labs   Lab 11/11/20  0604 11/10/20  1136   WBC 8.8 10.8   HGB 13.1* 13.7   MCV 92 92    330    138   POTASSIUM 3.8 3.8   CHLORIDE 107 107   CO2 25 26   BUN 23 32*   CR 0.86 0.85   ANIONGAP 7 5   SHEN 8.4* 9.2   * 108*   ALBUMIN  --  3.4   PROTTOTAL  --  7.6   BILITOTAL  --  0.4   ALKPHOS  --  59   ALT  --  28   AST  --  25       Imaging:   Recent Results (from the past 24 hour(s))   MR Foot Left w/o & w Contrast    Narrative    MR FOOT LEFT W/O & W CONTRAST   11/10/2020 5:14 PM     HISTORY:  left 2nd toe  osteomyelitis    COMPARISON: Radiographs from 11/9/2020    DOSE:  13 mL Gadavist     FINDINGS:  There is a small skin ulcer at the dorsal aspect of the second toe  distally. Mild soft tissue swelling of the second toe and dorsal  forefoot with enhancement is compatible with cellulitis. No discrete  fluid collection to suggest abscess.    There is diffuse increased T2/decreased T1 marrow signal involving the  second distal phalanx, with erosions/bone loss at the tuft. This  demonstrates enhancement on postcontrast sequences consistent with  acute osteomyelitis. No definite involvement of the middle phalanx.    No acute fracture or malalignment. Moderate third TMT joint  degenerative changes. Otherwise minimal to mild degenerative changes  throughout the forefoot. No significant joint effusion. Second through  fifth hammertoe deformities. Diffuse muscle atrophy in keeping with  diabetic neuropathy.      Impression    IMPRESSION:  1.  Acute osteomyelitis of the second distal phalanx with erosion/bone  loss at the tuft.  2.  Overlying skin ulcer with mild cellulitis. No abscess.  3.  Diffuse muscle atrophy in keeping with diabetic neuropathy.    KOREY SON MD   MR Foot Right w/o & w Contrast    Narrative    MR FOOT RIGHT W/O & W CONTRAST   11/10/2020 5:15 PM     HISTORY:  right foot wound, concern for infection or possible osteo    COMPARISON: Radiographs from 11/9/2020    DOSE:  13 mL Gadavist     FINDINGS:  Status post amputation of the second toe at the PIP joint. Status post  amputation of the third ray at the level of the distal metatarsal  diaphysis. Chronic deformities of the second and fourth proximal  phalanges. No abnormal marrow signal or enhancement to suggest acute  osteomyelitis. No acute fracture or malalignment. Mild multifocal  degenerative changes at the TMT and IP joints, most pronounced at the  fourth TMT joint. No significant joint effusion.    There is soft tissue swelling around the second  toe and MTP joints.  This demonstrates enhancement on postcontrast sequences consistent  with cellulitis. No discrete fluid collection to suggest abscess. No  definite ulcer is identified. Mild fluid in the tendon sheath at the  master knot of Luke. Diffuse muscle atrophy in keeping with diabetic  neuropathy.      Impression    IMPRESSION:  1.  No evidence of acute osteomyelitis.  2.  Cellulitis involving the second toe without abscess.  3.  Postsurgical changes of the second and third toes.  4.  Diffuse muscle atrophy in keeping with diabetic neuropathy.    KOREY SON MD

## 2020-11-12 NOTE — BRIEF OP NOTE
[facility]    Brief Operative Note    Pre-operative diagnosis: Osteomyelitis of left foot (H) [M86.9]  Post-operative diagnosis sp left 2nd toe amputation    Procedure: Procedure(s):  PARTIAL LEFT SECOND TOE AMPUTATION  Surgeon: Surgeon(s) and Role:     * Jase Rosales DPM - Primary  Anesthesia: Monitor Anesthesia Care   Estimated blood loss: Less than 10 ml  Drains: None  Specimens:   ID Type Source Tests Collected by Time Destination   1 : LEFT 2ND TOE BONE  Tissue Toe ANAEROBIC BACTERIAL CULTURE, GRAM STAIN, TISSUE CULTURE AEROBIC BACTERIAL Jase Rosales DPM 11/12/2020  7:34 AM    A : LEFT 2ND TOE  Tissue Toe SURGICAL PATHOLOGY EXAM Jase Rosales DPM 11/12/2020  7:35 AM      Findings:   healthy tissue at amputation level.  .  Complications: None.  Implants: * No implants in log *      All bone infection resected.  Wound was closed, no further surgery planned.  I anticipate he'll be ok for discharge tomorrow if wound is stable during tomorrow AM dressing change.

## 2020-11-12 NOTE — PROGRESS NOTES
[facility]  Hospitalist Progress Note for 11/12/2020  Date of Admission 11/10/2020       Assessment and Plan:   Mauricio Barrera is a very pleasant 69 year old male with a past medical history of right foot osteomyelitis with prior right second toe partial amputation and right third ray partial amputation, diabetes, hypertension, hyperlipidemia, lung nodule, and GI bleed who presents to podiatry clinic on 11/9/2020 to address right foot wound and left second toe wound.  He was found to have osteomyelitis and is directly admitted to [facility] on 11/10 for additional treatment.     2nd toe osteomyelitis  S/p Partial Left 2nd toe amputation on 11/12/2020   Bilateral foot wounds, cellulitis  Peripheral polyneuropathy  Patient with history of right foot osteomyelitis that required right second toe partial amputation, and right third ray partial amputation.  Cultures from 2016 grew MSSA and group B strep.  Patient now presents to podiatry clinic with left second toe wound and evidence of osteomyelitis on x-ray.  He has a right foot wound that may also be infected.  Podiatry recommended admission to hospital for MRI, likely left second toe amputation, and IV antibiotics- was started on Vancomycin & Zosyn initially on 11/10.  - MRI L foot showed acute osteomyelitis of the second distal phalanx with erosion/bone  loss at the tuft.Overlying skin ulcer with mild cellulitis. No abscess.  - MRI R foot -Cellulitis involving the second toe without abscess but no evidence of acute osteomyelitis.   - BC from 11/10 NTD, wound cult from 11/12 pending  - postop day # 0 s/p Partial Left 2nd toe amputation on 11/12/2020   - postop care PT, DVT prophylaxis per podiatry  - AB per ID  - podiatry, ID consults appreciated  - wound care following    Hypertension  Managed outpatient on olmesartan 20 mg daily, prazosin 2 mg daily  -adequate control  --Continue PTA medications-losartan , prazosin     Hyperlipidemia  --Resume prior to  mission simvastatin 20 mg nightly     Impaired fasting glucose  Last hemoglobin A1c 10/20/2020 is 5.6.  Managed outpatient on Metformin.  - range, at goal .  Plan;   --Hold Metformin for now  --We will place on daily Accu-Cheks   --Low-dose sliding scale insulin while hospitalized     Recent cataract surgery  --Resume eyedrops when verified by pharmacy        Asymptomatic COVID-19 screening:  Negative covid pcr swab testing.   Low suspicion.        DVT Prophylaxis: Mechanical with PCD's.  Consider chemoprophylaxis pending podiatry plans.     Code Status: Full Code     Disposition: possible discharge in AM with plan per podiartry & ID.    Mi Lara MD.  Hospitalist [phone] (7am -6 pm)                 Interval History:   no complaints              Medications:       acetaminophen  975 mg Oral Q8H     insulin aspart  1-3 Units Subcutaneous TID AC     insulin aspart  1-3 Units Subcutaneous At Bedtime     losartan  12.5 mg Oral Daily     ofloxacin  1 drop Right Eye 4x Daily     piperacillin-tazobactam  3.375 g Intravenous Q6H     prazosin  2 mg Oral BID     prednisoLONE acetate  1 drop Left Eye BID     [START ON 11/14/2020] prednisoLONE acetate  1 drop Left Eye Daily     prednisoLONE acetate  1 drop Right Eye 4x Daily    Followed by     [START ON 11/14/2020] prednisoLONE acetate  1 drop Right Eye TID    Followed by     [START ON 11/21/2020] prednisoLONE acetate  1 drop Right Eye BID    Followed by     [START ON 11/28/2020] prednisoLONE acetate  1 drop Right Eye Daily     simvastatin  20 mg Oral At Bedtime     sodium chloride (PF)  3 mL Intracatheter Q8H     acetaminophen, [START ON 11/15/2020] acetaminophen, calcium carbonate, glucose **OR** dextrose **OR** glucagon, HYDROmorphone, lidocaine 4%, lidocaine (buffered or not buffered), melatonin, naloxone, ondansetron **OR** ondansetron, oxyCODONE IR, polyethylene glycol, prochlorperazine **OR** prochlorperazine **OR** prochlorperazine, senna-docusate  "**OR** senna-docusate, sodium chloride (PF)               Physical Exam:   Blood pressure 139/88, pulse 60, temperature 97.8  F (36.6  C), temperature source Oral, resp. rate 11, height 1.88 m (6' 2\"), weight 135.7 kg (299 lb 3.2 oz), SpO2 93 %.  Wt Readings from Last 4 Encounters:   20 135.7 kg (299 lb 3.2 oz)   20 131.5 kg (290 lb)   20 132.9 kg (293 lb)   10/23/20 132.9 kg (293 lb)         Vital Sign Ranges  Temperature Temp  Av.8  F (36.6  C)  Min: 97.3  F (36.3  C)  Max: 98  F (36.7  C)   Blood pressure Systolic (24hrs), Av , Min:120 , Max:170        Diastolic (24hrs), Av, Min:70, Max:111      Pulse Pulse  Av.9  Min: 57  Max: 77   Respirations Resp  Av.2  Min: 9  Max: 18   Pulse oximetry SpO2  Av.4 %  Min: 93 %  Max: 98 %         Intake/Output Summary (Last 24 hours) at 2020 1413  Last data filed at 2020 0800  Gross per 24 hour   Intake 2357 ml   Output 1 ml   Net 2356 ml       Constitutional: Awake, alert, cooperative, no apparent distress   Lungs: Clear to auscultation bilaterally, no crackles or wheezing   Cardiovascular: Regular rate and rhythm, normal S1 and S2, and no murmur noted   Abdomen: Normal bowel sounds, soft, non-distended, non-tender   Skin: No rashes, no cyanosis, no edema   Neuro:                Data:   All laboratory data reviewed    "

## 2020-11-12 NOTE — ANESTHESIA POSTPROCEDURE EVALUATION
Patient: Mauricio Barrera    Procedure(s):  PARTIAL LEFT SECOND TOE AMPUTATION    Diagnosis:Osteomyelitis of left foot (H) [M86.9]  Diagnosis Additional Information: No value filed.    Anesthesia Type:  MAC    Note:  Anesthesia Post Evaluation    Patient location during evaluation: Bedside  Patient participation: Able to fully participate in evaluation  Level of consciousness: awake and alert  Pain management: adequate  Airway patency: patent  Cardiovascular status: acceptable  Respiratory status: acceptable  Hydration status: acceptable  PONV: none             Last vitals:  Vitals:    11/12/20 0840 11/12/20 0847 11/12/20 0910   BP: (!) 137/97  (!) 136/98   Pulse: 66  57   Resp: 11  11   Temp:   36.3  C (97.3  F)   SpO2: 95% 95% 95%         Electronically Signed By: Teodoro Ashley MD  November 12, 2020  9:15 AM

## 2020-11-12 NOTE — ANESTHESIA CARE TRANSFER NOTE
Patient: Mauricio Barrera    Procedure(s):  PARTIAL LEFT SECOND TOE AMPUTATION    Diagnosis: Osteomyelitis of left foot (H) [M86.9]  Diagnosis Additional Information: No value filed.    Anesthesia Type:   MAC     Note:  Airway :Face Mask  Patient transferred to:PACU  Comments: At end of procedure, spontaneous respirations, patient alert to voice, able to follow commands. Oxygen via facemask at 6 liters per minute to PACU. Oxygen tubing connected to wall O2 in PACU, SpO2, NiBP, and EKG monitors and alarms on and functioning, report on patient's clinical status given to PACU RN, RN questions answered.Handoff Report: Identifed the Patient, Identified the Reponsible Provider, Reviewed the pertinent medical history, Discussed the surgical course, Reviewed Intra-OP anesthesia mangement and issues during anesthesia, Set expectations for post-procedure period and Allowed opportunity for questions and acknowledgement of understanding      Vitals: (Last set prior to Anesthesia Care Transfer)    CRNA VITALS  11/12/2020 0738 - 11/12/2020 0817      11/12/2020             Pulse:  61    SpO2:  97 %    Resp Rate (set):  10                Electronically Signed By: JEREMY Ramirez CRNA  November 12, 2020  8:17 AM

## 2020-11-12 NOTE — PLAN OF CARE
Patient is A/ox4. VSS on RA except hypertensive, scheduled bedtime BP med given. Denied pain. LS clear. Regular diet. BS active. Passing gas. BMx2 this afternoon per patient report. Baseline neuropathy to BLE. Wound cares completed by [facility] nurse today. L second toe and R plantar surface covered with polymem dressing - CDI. R PIV infusing NS at 75mL/hr with intermittent IV zosyn. Patient personal eye drops labeled by pharmacy this evening, patient completes EDs to eyes independently. Up independently. NPO at midnight for  partial L second toe amputation with Dr. Rosales tomorrow morning at 0730. Plan for discharge on Friday. Nursing to continue to monitor.

## 2020-11-12 NOTE — PROGRESS NOTES
[facility]  Infectious Disease Progress Note          Assessment and Plan:   IMPRESSION:   1.  A 69-year-old male with acute and subacute worsening, left second toe, with osteomyelitis on imaging, not major cellulitis and no clinical sepsis.   2.  History of bilateral foot wound issues, including right foot amputations, current abnormal right foot without clear deep infection or cellulitis.   3.  Chronic peripheral neuropathy, idiopathic, variously listed as diabetic; he says he is not.  Hemoglobin A1c's have been borderline elevated, not being treated as diabetic.   4.  Hypertension and hyperlipidemia.   5.  Negative COVID-19.      RECOMMENDATIONS:   1.  Has had historically had some renal issues, even though normal renal function now.   not worth vancomycin toxicity; discontinue for now.  Will be on board anyway.  Continue Zosyn.   2.  Postop, if obvious major infection resected, while awaiting info, probably okay to discharge on empiric Augmentin for roughly 2 weeks and readjust if cultures grow something.  If here longer, continue IV and we will adjust cultures.              Interval History:   no new complaints and doing well; no cp, sob, n/v/d, or abd pain. In OR await findings              Medications:       [Auto Hold] insulin aspart  1-3 Units Subcutaneous TID AC     [Auto Hold] insulin aspart  1-3 Units Subcutaneous At Bedtime     [Auto Hold] losartan  12.5 mg Oral Daily     [Auto Hold] ofloxacin  1 drop Right Eye 4x Daily     [Auto Hold] piperacillin-tazobactam  3.375 g Intravenous Q6H     [Auto Hold] prazosin  2 mg Oral BID     [Auto Hold] prednisoLONE acetate  1 drop Left Eye BID     [Auto Hold] prednisoLONE acetate  1 drop Left Eye Daily     [Auto Hold] prednisoLONE acetate  1 drop Right Eye 4x Daily    Followed by     [Auto Hold] prednisoLONE acetate  1 drop Right Eye TID    Followed by     [Auto Hold] prednisoLONE acetate  1 drop Right Eye BID    Followed by     [Auto Hold]  "prednisoLONE acetate  1 drop Right Eye Daily     [Auto Hold] simvastatin  20 mg Oral At Bedtime     [Auto Hold] sodium chloride (PF)  3 mL Intracatheter Q8H                  Physical Exam:   Blood pressure (!) 170/111, pulse 64, temperature 98  F (36.7  C), temperature source Temporal, resp. rate 16, height 1.88 m (6' 2\"), weight 135.7 kg (299 lb 3.2 oz), SpO2 95 %.  Wt Readings from Last 2 Encounters:   11/11/20 135.7 kg (299 lb 3.2 oz)   11/09/20 131.5 kg (290 lb)     Vital Signs with Ranges  Temp:  [96.8  F (36  C)-98  F (36.7  C)] 98  F (36.7  C)  Pulse:  [59-77] 64  Resp:  [16-18] 16  BP: (137-170)/() 170/111  SpO2:  [95 %-96 %] 95 %    Constitutional: Awake, alert, cooperative, no apparent distress   Lungs: Clear to auscultation bilaterally, no crackles or wheezing   Cardiovascular: Regular rate and rhythm, normal S1 and S2, and no murmur noted   Abdomen: Normal bowel sounds, soft, non-distended, non-tender   Skin: No rashes, no cyanosis, no edema   Other:           Data:   All microbiology laboratory data reviewed.  Recent Labs   Lab Test 11/11/20  0604 11/10/20  1136 04/20/19  0011   WBC 8.8 10.8 12.0*   HGB 13.1* 13.7 15.2   HCT 40.7 41.2 43.5   MCV 92 92 91    330 277     Recent Labs   Lab Test 11/11/20  0604 11/10/20  1136 10/20/20  1041   CR 0.86 0.85 0.82     Recent Labs   Lab Test 11/10/20  1136   SED 12     Recent Labs   Lab Test 11/10/20  1143 11/10/20  1135 09/22/16  1315 09/22/16  1302 09/21/16  1545 09/21/16  1540 05/13/14  1120   CULT No growth after 2 days No growth after 2 days No anaerobes isolated  Light growth Staphylococcus aureus* No anaerobes isolated  Heavy growth Staphylococcus aureus  Light growth Beta hemolytic Streptococcus group B  Light growth Gram positive cocci No further identification Susceptibility testing   not routinely done  Heavy growth Gram positive bacilli resembling diphtheroids No further   identification Susceptibility testing not routinely done  * No " growth No growth Light growth Finegoldia magna (Peptostreptococcus agus)  Susceptibility testing not routinely done  *  Light growth Staphylococcus aureus  Light growth Streptococcus anginosus group  *

## 2020-11-12 NOTE — PLAN OF CARE
Denies pain. Up ind with walker to bathroom. Tolerating PO intake. LLE dressing CDI, elevated on wedge. Continue IV abx. Plan for discharge tomorrow.

## 2020-11-12 NOTE — ANESTHESIA PREPROCEDURE EVALUATION
Anesthesia Pre-Procedure Evaluation    Patient: Mauricio Barrera   MRN: [id number] : 1950          Preoperative Diagnosis: Osteomyelitis of left foot (H) [M86.9]    Procedure(s):  PARTIAL LEFT SECOND TOE AMPUTATION    Past Medical History:   Diagnosis Date     Calculus of gallbladder without mention of cholecystitis or obstruction     screened,  neg for AAA,,Crtd stnss,PAD     Combined forms of age-related cataract of left eye      Foot osteomyelitis, right (H)      Hyperlipidemia LDL goal < 130      Hypertension      Incidental lung nodule, > 3mm and < 8mm 2004    3.5 mm nodule right lung base - stable by CT x 2 years     Lumbago      Rectal bleeding      Past Surgical History:   Procedure Laterality Date     AMPUTATE FOOT Right 2016    Procedure: AMPUTATE FOOT;  Surgeon: Melchor Auguste DPM;  Location:  OR     AMPUTATE FOOT Right 2016    Procedure: AMPUTATE FOOT;  Surgeon: Venkat Ramos DPM;  Location:  OR     AMPUTATE TOE(S)  2014    Procedure: AMPUTATE TOE(S);  Surgeon: Venkat Ramos DPM;  Location:  OR      COLONOSCOPY THRU STOMA, DIAGNOSTIC  3/03    diverticulae,some inflamed,hemorrhoids     HEMORRHOIDECTOMY      single,anterior     IRRIGATION AND DEBRIDEMENT FOOT, COMBINED Right 2016    Procedure: COMBINED IRRIGATION AND DEBRIDEMENT FOOT;  Surgeon: Melchor Auguste DPM;  Location:  OR     PHACOEMULSIFICATION CLEAR CORNEA WITH STANDARD INTRAOCULAR LENS IMPLANT Left 10/23/2020    Procedure: LEFT EYE CATARACT REMOVAL WITH INTRAOCULAR LENS IMPLANT;  Surgeon: Pat King MD;  Location: [facility] OR     PHACOEMULSIFICATION CLEAR CORNEA WITH STANDARD INTRAOCULAR LENS IMPLANT Right 2020    Procedure: RIGHT EYE CATARACT REMOVAL WITH INTRAOCULAR LENS IMPLANT;  Surgeon: Pat King MD;  Location: [facility] OR     REPAIR HAMMER TOE  2014    Procedure: REPAIR HAMMER TOE;  Surgeon: Venkat Ramos DPM;  Location:  OR     [facility] NONSPECIFIC  PROCEDURE      tonsillectomy     [facility] NONSPECIFIC PROCEDURE      hernia surgery     [facility] NONSPECIFIC PROCEDURE  5/04    reduction torsion left testis       Anesthesia Evaluation     .             ROS/MED HX    ENT/Pulmonary:     (+)tobacco use, Past use , . .   (-) sleep apnea   Neurologic:     (+)neuropathy     Cardiovascular:     (+) Dyslipidemia, hypertension----. : . . . :. . Previous cardiac testing date:results:date: results:ECG reviewed date:11/2020 results:NSR, LAD, RBBB date: results:          METS/Exercise Tolerance:     Hematologic:         Musculoskeletal:         GI/Hepatic:  - neg GI/hepatic ROS      (-) GERD   Renal/Genitourinary:  - ROS Renal section negative       Endo: Comment: BMI 37    (+) type II DM Obesity, .      Psychiatric:         Infectious Disease: Comment: Osteomyelitis right foot        Malignancy:         Other:                          Physical Exam  Normal systems: dental    Airway   Mallampati: II  TM distance: >3 FB  Neck ROM: full    Dental     Cardiovascular   Rhythm and rate: regular      Pulmonary    breath sounds clear to auscultation            Lab Results   Component Value Date    WBC 8.8 11/11/2020    HGB 13.1 (L) 11/11/2020    HCT 40.7 11/11/2020     11/11/2020    CRP 3.5 11/10/2020    SED 12 11/10/2020     11/11/2020    POTASSIUM 3.8 11/11/2020    CHLORIDE 107 11/11/2020    CO2 25 11/11/2020    BUN 23 11/11/2020    CR 0.86 11/11/2020     (H) 11/11/2020    SHEN 8.4 (L) 11/11/2020    MAG 2.2 09/23/2016    ALBUMIN 3.4 11/10/2020    PROTTOTAL 7.6 11/10/2020    ALT 28 11/10/2020    AST 25 11/10/2020    ALKPHOS 59 11/10/2020    BILITOTAL 0.4 11/10/2020    INR 1.14 04/20/2019    TSH 5.19 (H) 02/14/2003    T4 0.94 02/14/2003       Preop Vitals  BP Readings from Last 3 Encounters:   11/11/20 (!) 141/70   11/09/20 120/81   11/06/20 138/87    Pulse Readings from Last 3 Encounters:   11/11/20 68   11/06/20 58   10/23/20 76      Resp Readings from Last 3 Encounters:  "  11/11/20 18   11/06/20 16   10/23/20 16    SpO2 Readings from Last 3 Encounters:   11/11/20 95%   11/06/20 96%   10/23/20 92%      Temp Readings from Last 1 Encounters:   11/11/20 36.5  C (97.7  F) (Oral)    Ht Readings from Last 1 Encounters:   11/10/20 1.88 m (6' 2\")      Wt Readings from Last 1 Encounters:   11/11/20 135.7 kg (299 lb 3.2 oz)    Estimated body mass index is 38.42 kg/m  as calculated from the following:    Height as of this encounter: 1.88 m (6' 2\").    Weight as of this encounter: 135.7 kg (299 lb 3.2 oz).       Anesthesia Plan      History & Physical Review  History and physical reviewed and following examination; no interval change.    ASA Status:  2 .    NPO Status:  > 8 hours    Plan for MAC   PONV prophylaxis:  Ondansetron (or other 5HT-3)         Postoperative Care  Postoperative pain management:  Multi-modal analgesia.      Consents  Anesthetic plan, risks, benefits and alternatives discussed with:  Patient..                 Teodoro Ashley MD  "

## 2020-11-12 NOTE — OP NOTE
Procedure Date: 11/12/2020      SURGEON:  Jase Rosales DPM      PREOPERATIVE DIAGNOSES:   1.  Diabetes mellitus with peripheral neuropathy.   2.  Chronic left distal second toe ulceration.   3.  Osteomyelitis, left second distal phalanx.      POSTOPERATIVE DIAGNOSES:   1.  Diabetes mellitus with peripheral neuropathy.   2.  Chronic left distal second toe ulceration.   3.  Osteomyelitis, left second distal phalanx.      PROCEDURE:  Partial left second toe amputation with disarticulation at the proximal interphalangeal joint.      PATHOLOGY:     1.  Bone from the distal phalanx was sent off for cultures.   2.  Left second toe was sent off for gross only evaluation.      ANESTHESIA:  MAC with local.      HEMOSTASIS:  None.      ESTIMATED BLOOD LOSS:  2 mL.      MATERIALS:  None.      INJECTABLES:  7 mL of 0.25% bupivacaine plain preoperatively.      COMPLICATIONS:  None apparent.      INDICATIONS FOR PROCEDURE:  The patient is a pleasant 69-year-old neuropathic diabetic male with a chronic distal left second toe ulceration.  He was seen in clinic with Dr. Auguste.  X-rays were concerning for osteomyelitis of the distal phalanx.  He was admitted to the hospital.  An MRI essentially confirmed this.  He also has a chronic plantar right forefoot ulceration, but an MRI was negative for evidence of deep infection.  We discussed conservative versus surgical options for the right foot.  He wished to continue with wound cares.      DESCRIPTION OF PROCEDURE:  After obtaining written consent, the patient was transferred to the operating room, placed in supine position on the operating room table.  IV sedation was initiated.  The foot was anesthetized with a preoperative local.  It was then prepped and draped in normal aseptic fashion.  No tourniquet was utilized.  The patient, procedure, and site were correctly identified by OR staff.      Attention was directed to the right second toe.  We discussed amputation level  based on wound position, involved bone, and his rather prominent hammertoe.  I elected to proceed with a disarticulation at the proximal interphalangeal joint in hopes of maintaining as much length as possible, as this was important to the patient.  A fishmouth incision with apices medial and lateral was carried down to bone overlying the middle phalanx.  The incision was then carried down to bone and the proximal interphalangeal joint was disarticulated.  The distal aspect of the toe was handed off.  After adequate hemostasis was achieved, the wound was flushed with copious amounts of normal saline.  As there was no deep signs of infection or tissue necrosis at the amputation level, I proceeded with single layer closure with 4-0 nylon with minimal tension on the skin margins.      A dry sterile dressing was applied to the patient's left foot.  He appeared to tolerate the procedure and anesthesia well and was transferred to the PACU with vital signs stable and vascular status intact to the foot.  The patient will be readmitted to the floor for IV antibiotics and monitoring.         XENIA ORELLANA DPM             D: 2020   T: 2020   MT: KESHAV      Name:     JENARO LORA   MRN:      [id number]        Account:        [id number]   :      1950           Procedure Date: 2020      Document: [id number]

## 2020-11-12 NOTE — PLAN OF CARE
Patient is A/ox4. VSS on RA. Denied pain. LS clear. Regular diet. BS active. Baseline neuropathy to BLE. Wound cares completed by [facility] nurse today. L second toe and R plantar surface covered with polymem dressing - CDI. R PIV infusing NS at 75mL/hr with intermittent IV zosyn. Up independently.

## 2020-11-13 NOTE — PROGRESS NOTES
[facility]  Infectious Disease Progress Note          Assessment and Plan:   IMPRESSION:   1.  A 69-year-old male with acute and subacute worsening, left second toe, with osteomyelitis on imaging, not major cellulitis and no clinical sepsis.   2.  History of bilateral foot wound issues, including right foot amputations, current abnormal right foot without clear deep infection or cellulitis.   3.  Chronic peripheral neuropathy, idiopathic, variously listed as diabetic; he says he is not.  Hemoglobin A1c's have been borderline elevated, not being treated as diabetic.   4.  Hypertension and hyperlipidemia.   5.  Negative COVID-19.      RECOMMENDATIONS:   1.  Has had historically had some renal issues, even though normal renal function now.   not worth vancomycin toxicity; discontinue for now.  Will be on board anyway.  Continue Zosyn while here.   2.  Postop,looks like any deep infection resected, while awaiting info,  okay to discharge on empiric Augmenti 875 bidn for 10 days, will readjust as outpt if cultures grow something.  .              Interval History:   no new complaints and doing well; no cp, sob, n/v/d, or abd pain. I OR  Findings noted cx pending              Medications:       acetaminophen  975 mg Oral Q8H     insulin aspart  1-3 Units Subcutaneous TID AC     insulin aspart  1-3 Units Subcutaneous At Bedtime     losartan  12.5 mg Oral Daily     ofloxacin  1 drop Right Eye 4x Daily     piperacillin-tazobactam  3.375 g Intravenous Q6H     prazosin  2 mg Oral BID     prednisoLONE acetate  1 drop Left Eye BID     [START ON 11/14/2020] prednisoLONE acetate  1 drop Left Eye Daily     prednisoLONE acetate  1 drop Right Eye 4x Daily    Followed by     [START ON 11/14/2020] prednisoLONE acetate  1 drop Right Eye TID    Followed by     [START ON 11/21/2020] prednisoLONE acetate  1 drop Right Eye BID    Followed by     [START ON 11/28/2020] prednisoLONE acetate  1 drop Right Eye Daily      "simvastatin  20 mg Oral At Bedtime     sodium chloride (PF)  3 mL Intracatheter Q8H                  Physical Exam:   Blood pressure (!) 154/95, pulse 61, temperature 96.6  F (35.9  C), temperature source Oral, resp. rate 16, height 1.88 m (6' 2\"), weight 135.5 kg (298 lb 12.8 oz), SpO2 94 %.  Wt Readings from Last 2 Encounters:   11/13/20 135.5 kg (298 lb 12.8 oz)   11/09/20 131.5 kg (290 lb)     Vital Signs with Ranges  Temp:  [96.6  F (35.9  C)-97.9  F (36.6  C)] 96.6  F (35.9  C)  Pulse:  [60-62] 61  Resp:  [11-16] 16  BP: (139-161)/(88-96) 154/95  SpO2:  [92 %-94 %] 94 %    Constitutional: Awake, alert, cooperative, no apparent distress   Lungs: Clear to auscultation bilaterally, no crackles or wheezing   Cardiovascular: Regular rate and rhythm, normal S1 and S2, and no murmur noted   Abdomen: Normal bowel sounds, soft, non-distended, non-tender   Skin: No rashes, no cyanosis, no edema   Other:           Data:   All microbiology laboratory data reviewed.  Recent Labs   Lab Test 11/11/20  0604 11/10/20  1136 04/20/19  0011   WBC 8.8 10.8 12.0*   HGB 13.1* 13.7 15.2   HCT 40.7 41.2 43.5   MCV 92 92 91    330 277     Recent Labs   Lab Test 11/11/20  0604 11/10/20  1136 10/20/20  1041   CR 0.86 0.85 0.82     Recent Labs   Lab Test 11/10/20  1136   SED 12     Recent Labs   Lab Test 11/12/20  0734 11/10/20  1143 11/10/20  1135 09/22/16  1315 09/22/16  1302 09/21/16  1545 09/21/16  1540 05/13/14  1120   CULT Culture negative monitoring continues No growth after 3 days No growth after 3 days No anaerobes isolated  Light growth Staphylococcus aureus* No anaerobes isolated  Heavy growth Staphylococcus aureus  Light growth Beta hemolytic Streptococcus group B  Light growth Gram positive cocci No further identification Susceptibility testing   not routinely done  Heavy growth Gram positive bacilli resembling diphtheroids No further   identification Susceptibility testing not routinely done  * No growth No growth " Light growth Finegojose mariaia magna (Peptostreptococcus agus)  Susceptibility testing not routinely done  *  Light growth Staphylococcus aureus  Light growth Streptococcus anginosus group  *

## 2020-11-13 NOTE — PLAN OF CARE
HTN before meds, A/o, indep in room. Meds filled, AVS reviewed and questions answered, patient was escorted to his car. Will follow up w-PMD and Podiatry as instructed.

## 2020-11-13 NOTE — PROGRESS NOTES
11/13/20 1130   Quick Adds   Type of Visit Initial PT Evaluation   Living Environment   People in home spouse   Current Living Arrangements condominium   Home Accessibility no concerns   Transportation Anticipated car, drives self   Living Environment Comments No steps or stairs. He has crutches he can use.    Self-Care   Usual Activity Tolerance excellent   Current Activity Tolerance good   Equipment Currently Used at Home none   Activity/Exercise/Self-Care Comment Independent with all mobility and ADLs at baseline.    Disability/Function   Hearing Difficulty or Deaf no   Wear Glasses or Blind no   Concentrating, Remembering or Making Decisions Difficulty no   Difficulty Communicating no   Difficulty Eating/Swallowing no   Walking or Climbing Stairs Difficulty no   Dressing/Bathing Difficulty no   Toileting no   Doing Errands Independently Difficulty (such as shopping) no   Fall history within last six months no   General Information   Onset of Illness/Injury or Date of Surgery 11/12/20   Referring Physician Jase Rosales DPM   Patient/Family Therapy Goals Statement (PT) to go home toay   Pertinent History of Current Problem (include personal factors and/or comorbidities that impact the POC) Patient is 70 YO M with PMH includig peripheral polyneuropathy, R 2nd and 3rd toe amputations, DM2, lung nodule admitted due to Osteomyelitis on L foot.He is POD # s/p L second toe amputation with disarticulation at the proximal interphalangeal joint.    Existing Precautions/Restrictions fall   Weight-Bearing Status - LLE partial weight-bearing (% in comments)  (Heel weight bearing with post op shoe)   Cognition   Orientation Status (Cognition) oriented x 4   Affect/Mental Status (Cognition) WNL   Follows Commands (Cognition) WNL   Cognitive Status Comments Pleasant and cooperative during session   Pain Assessment   Patient Currently in Pain No   Integumentary/Edema   Integumentary/Edema Comments No drainage noted on  L foot   Posture    Posture Protracted shoulders   Range of Motion (ROM)   ROM Quick Adds ROM WFL   Strength   Manual Muscle Testing Quick Adds Strength WFL;Able to perform R SLR;Able to perform L SLR   Bed Mobility   Bed Mobility supine-sit   Supine-Sit [place] (Bed Mobility) independent   Transfers   Transfer Safety Comments Independent sit <> stand transfers from bed at lowest height   Gait/Stairs (Locomotion)   [place] Level (Gait) supervision;verbal cues   Assistive Device (Gait) crutches, axillary   Distance in Feet (Required for LE Total Joints) 10   Pattern (Gait) 3-point   Deviations/Abnormal Patterns (Gait) stride length decreased;gait speed decreased   Maintains Weight-bearing Status (Gait) verbal cues to maintain   Sensory Examination   Sensory Perception Comments Chronic neuropathy numbness on bottom of B feet, unchanged from baseline   Coordination   Coordination no deficits were identified   Clinical Impression   Criteria for Skilled Therapeutic Intervention yes, treatment indicated   PT Diagnosis (PT) impaired gait   Influenced by the following impairments heel WB on L LE   Functional limitations due to impairments decline in ambulation   Clinical Presentation Stable/Uncomplicated   Clinical Presentation Rationale no pain, scheduled for discharge today   Clinical Decision Making (Complexity) low complexity   Therapy Frequency (PT) One time eval and treatment only   Predicted Duration of Therapy Intervention (days/wks) 1 tx day   Planned Therapy Interventions (PT) patient/family education;gait training   Risk & Benefits of therapy have been explained evaluation/treatment results reviewed;care plan/treatment goals reviewed;risks/benefits reviewed;current/potential barriers reviewed;participants voiced agreement with care plan;participants included;patient   Clinical Impression Comments Patient met acute PT goals during initial treatment session, no further acute PT needs.    PT Discharge  Planning    PT Discharge Recommendation (DC Rec) home   PT Rationale for DC Rec Patient mobilizing with modified independenc with B axillary crutches by end of initial session, no further acute PT needs.    Total Evaluation Time   Total Evaluation Time (Minutes) 8

## 2020-11-13 NOTE — PLAN OF CARE
Physical Therapy Discharge Summary    Reason for therapy discharge:    All goals and outcomes met, no further needs identified.    Progress towards therapy goal(s). See goals on Care Plan in [facility] electronic health record for goal details.  Goals met    Therapy recommendation(s):    No further therapy is recommended.

## 2020-11-13 NOTE — PROGRESS NOTES
"Foot & Ankle Surgery  November 13, 2020    Patient seen at bedside this AM POD#1 sp partial L 2nd toe amp.  Doing well, up and about at bedside.  Minimal pain reported.    BP (!) 154/95 (BP Location: Right arm)   Pulse 61   Temp 96.6  F (35.9  C) (Oral)   Resp 16   Ht 1.88 m (6' 2\")   Wt 135.5 kg (298 lb 12.8 oz)   SpO2 94%   BMI 38.36 kg/m      PE - sutures intact, skin margins well coapted.  No necrotis, no drainage, no dehiscence    Cultures - pending    Gram - no organisms    Imaging - IMPRESSION: Second toe amputation at the PIP joint level.    A/P - 70 yo neuropathic diabetic male POD#1 sp partial L 2nd toe amputation for osteomyelitis  -dressing change.  Surgical site stable without concerns  -PO abx course per ID, agree with Dr Lay' plan  -will place foot-specific discharge orders and sign off.  Reviewed with patient.  Please call with questions or acute changes to patient/wound status.    Jase Rosales DPM FACFAS FACFAOM  Podiatric Foot & Ankle Surgeon  [facility]  [phone]      "

## 2020-11-13 NOTE — DISCHARGE SUMMARY
[facility]    Discharge Summary  Hospitalist    Date of Admission:  11/10/2020  Date of Discharge:  11/13/2020  Discharging Provider: Alyse Cowan MD    Discharge Diagnoses   Second toe osteomyelitis status post partial left second toe amputation on 11/12    History of Present Illness   Please review admission history and physical.    Hospital Course   Mauricio Barrera was admitted on 11/10/2020.  The following problems were addressed during his hospitalization:    Active Problems:    Osteomyelitis (H)  Mauricio Barrera is a very pleasant 69 year old male with a past medical history of right foot osteomyelitis with prior right second toe partial amputation and right third ray partial amputation, diabetes, hypertension, hyperlipidemia, lung nodule, and GI bleed who presents to podiatry clinic on 11/9/2020 to address right foot wound and left second toe wound.  He was found to have osteomyelitis and is directly admitted to [facility] on 11/10 for additional treatment.  2nd toe osteomyelitis  S/p Partial Left 2nd toe amputation on 11/12/2020   Bilateral foot wounds, cellulitis  Peripheral polyneuropathy  Patient was seen by podiatry service here, the recommended starting IV antibiotics, vancomycin and Zosyn, MRI was obtained here of the left foot showed acute osteomyelitis of the second distal phalanx with erosion of the bone.  MRI of the right foot showed cellulitis involving the second toe without abscess but no evidence of acute osteomyelitis.  Patient underwent partial left second toe amputation on 11/12, his blood cultures remain negative here, wound cultures were noted, seen by infectious disease and they recommended Augmentin 875 mg p.o. twice daily for 10 more days.  Podiatry had mentioned that the margins were clear at the time of surgery.  He was seen by wound care.  Blood pressures were stable during his stay here, his PTA medications including losartan and prazosin to continue upon  discharge.  His statin was also continued, Metformin was temporarily on hold and he was on sliding scale, restart at the time of discharge.  Covid negative PCR swab noted    Alyse Cowan MD, MD    Significant Results and Procedures       Pending Results   These results will be followed up by podiatry service  Unresulted Labs Ordered in the Past 30 Days of this Admission     Date and Time Order Name Status Description    11/12/2020 0752 Tissue Culture Aerobic Bacterial Preliminary     11/12/2020 0752 Anaerobic bacterial culture Preliminary     11/10/2020 1022 Blood culture Preliminary     11/10/2020 1022 Blood culture Preliminary           Code Status   Full Code       Primary Care Physician   Leon Hinojosa MD    Physical Exam   Temp: 96.6  F (35.9  C) Temp src: Oral BP: (!) 154/95 Pulse: 61   Resp: 16 SpO2: 94 % O2 Device: None (Room air)    Vitals:    11/10/20 1100 11/11/20 0607 11/13/20 0641   Weight: 131.5 kg (290 lb) 135.7 kg (299 lb 3.2 oz) 135.5 kg (298 lb 12.8 oz)     Vital Signs with Ranges  Temp:  [96.6  F (35.9  C)-97.9  F (36.6  C)] 96.6  F (35.9  C)  Pulse:  [60-62] 61  Resp:  [11-16] 16  BP: (139-161)/(88-96) 154/95  SpO2:  [92 %-94 %] 94 %  I/O last 3 completed shifts:  In: 980 [P.O.:480; I.V.:500]  Out: 1 [Blood:1]    The patient was examined on the day of discharge.    Discharge Disposition   Discharged to home  Condition at discharge: Stable    Consultations This Hospital Stay   PODIATRY IP CONSULT  PHARMACY TO DOSE VANCO  INFECTIOUS DISEASES IP CONSULT  WOUND OSTOMY CONTINENCE NURSE  IP CONSULT  PHYSICAL THERAPY ADULT IP CONSULT    Time Spent on this Encounter   I, Alyse Cowan MD, personally saw the patient today and spent greater than 30 minutes discharging this patient.    Discharge Orders      Follow-up and recommended labs and tests     Thank you for choosing [facility]!    Follow up with Dr Rosales at one of the clinic locations within 7-10  days of discharge.    DR. ORELLANA'S CLINIC LOCATIONS:      TUESDAY - [place]  [address]   [place] [address] [phone]          [facility]  [address]  [place] [address] [phone]     Activity    1.  Perform the following activities every 2 hours x 5 minutes:  -ankle ROM/calf massaging bilateral lower extremity.  If you are not comfortable moving the surgical ankle, you can wiggle the toes on that foot  -deep breathing/coughing exercises  -ambulation; keep in mind your weightbearing restrictions    2.  Elevate surgical limb above hip level 23/24 hours per day for aggressive swelling control, pain control, and to help facilitate incision healing.  This is to be done until sutures are removed.    3.  Heel weight bearing in surgical shoe; avoid putting weight on foot near toes.     Wound care and dressings    You can take a shower or bath, but cover the bandage. Keep surgical dressing clean, dry and intact until your first post-operative appointment.     Reason for your hospital stay    Osteomyelitis of toe     Follow-up and recommended labs and tests     Follow up with primary care provider, Leon Hinojosa MD, within 7 days to evaluate medication change and for hospital follow- up.  No follow up labs or test are needed.follow up with podiatry as scheduled, discuss about pathology report.     Activity    Your activity upon discharge: activity as tolerated     Discharge Instructions    Follow up with podiatry as recommended     Diet    Follow this diet upon discharge: Orders Placed This Encounter      Moderate Consistent CHO Diet     Discharge Medications   Current Discharge Medication List      START taking these medications    Details   amoxicillin-clavulanate (AUGMENTIN) 875-125 MG tablet Take 1 tablet by mouth 2 times daily for 10 days  Qty: 20 tablet, Refills: 0    Associated Diagnoses: Chronic osteomyelitis of left foot with draining sinus (H)       HYDROcodone-acetaminophen (NORCO) 5-325 MG tablet Take 1-2 tablets every 4-6 hours as needed for pain.  Do not take other tylenol products with this medication, as too much tylenol can be damaging to the liver.  Qty: 10 tablet, Refills: 0    Associated Diagnoses: S/P amputation of lesser toe, left (H); Chronic osteomyelitis of left foot with draining sinus (H); Impaired fasting glucose         CONTINUE these medications which have NOT CHANGED    Details   metFORMIN (GLUCOPHAGE) 500 MG tablet Take 500 mg by mouth 2 times daily (with meals)      ofloxacin (OCUFLOX) 0.3 % ophthalmic solution 1 drop 4x daily in the surgical eye for 1 week after surgery, then stop  Qty: 1 Bottle, Refills: 0    Comments: For cataract surgery at [facility] right eye on 11/6/20  Associated Diagnoses: Combined form of age-related cataract, right eye      olmesartan (BENICAR) 20 MG tablet Take 20 mg by mouth 2 times daily       prazosin (MINIPRESS) 2 MG capsule Take 2 mg by mouth 2 times daily       !! prednisoLONE acetate (PRED FORTE) 1 % ophthalmic suspension 1 drop in surgical eye as directed, 4x daily after surgery for 1 week, 3x daily for 1 week, 2x daily for 1 week, daily for 1 week, then stop  Qty: 1 Bottle, Refills: 1    Comments: For cataract surgery at [facility] right eye on 11/6/20  Associated Diagnoses: Combined form of age-related cataract, right eye      !! prednisoLONE acetate (PRED FORTE) 1 % ophthalmic suspension 1 drop in surgical eye as directed, 4x daily after surgery for 1 week, 3x daily for 1 week, 2x daily for 1 week, daily for 1 week, then stop  Qty: 1 Bottle, Refills: 1    Comments: For cataract surgery at [facility] left eye on 10/23/20  Associated Diagnoses: Age-related nuclear cataract, left      simvastatin (ZOCOR) 20 MG tablet Take 1 tablet (20 mg) by mouth At Bedtime  Qty: 90 tablet, Refills: 3    Associated Diagnoses: Hyperlipidemia with target LDL less than 130       !! - Potential duplicate  medications found. Please discuss with provider.      STOP taking these medications       levofloxacin (LEVAQUIN) 750 MG tablet Comments:   Reason for Stopping:             Allergies   No Known Allergies  Data   Most Recent 3 CBC's:  Recent Labs   Lab Test 11/11/20  0604 11/10/20  1136 04/20/19  0011   WBC 8.8 10.8 12.0*   HGB 13.1* 13.7 15.2   MCV 92 92 91    330 277      Most Recent 3 BMP's:  Recent Labs   Lab Test 11/12/20  0700 11/11/20  0604 11/10/20  1136 10/20/20  1041   NA  --  139 138 136   POTASSIUM 3.5 3.8 3.8 3.9   CHLORIDE  --  107 107 104   CO2  --  25 26 25   BUN  --  23 32* 22   CR  --  0.86 0.85 0.82   ANIONGAP  --  7 5 7   SHEN  --  8.4* 9.2 9.5   * 104* 108* 116*     Most Recent 2 LFT's:  Recent Labs   Lab Test 11/10/20  1136 10/20/20  1041   AST 25 28   ALT 28 32   ALKPHOS 59 51   BILITOTAL 0.4 0.4     Most Recent INR's and Anticoagulation Dosing History:  Anticoagulation Dose History     Recent Dosing and Labs Latest Ref Rng & Units 4/20/2019    INR 0.86 - 1.14 1.14        Most Recent 3 Troponin's:No lab results found.  Most Recent Cholesterol Panel:  Recent Labs   Lab Test 10/20/20  1041   CHOL 162   LDL 63   HDL 38*   TRIG 306*     Most Recent 6 Bacteria Isolates From Any Culture (See EPIC Reports for Culture Details):  Recent Labs   Lab Test 11/12/20  0734 11/10/20  1143 11/10/20  1135 09/22/16  1315 09/22/16  1302 09/21/16  1545   CULT Culture negative monitoring continues  Culture negative monitoring continues No growth after 3 days No growth after 3 days No anaerobes isolated  Light growth Staphylococcus aureus* No anaerobes isolated  Heavy growth Staphylococcus aureus  Light growth Beta hemolytic Streptococcus group B  Light growth Gram positive cocci No further identification Susceptibility testing   not routinely done  Heavy growth Gram positive bacilli resembling diphtheroids No further   identification Susceptibility testing not routinely done  * No growth     Most  Recent TSH, T4 and A1c Labs:  Recent Labs   Lab Test 10/20/20  1041   A1C 5.6     Results for orders placed or performed during the hospital encounter of 11/10/20   MR Foot Left w/o & w Contrast    Narrative    MR FOOT LEFT W/O & W CONTRAST   11/10/2020 5:14 PM     HISTORY:  left 2nd toe osteomyelitis    COMPARISON: Radiographs from 11/9/2020    DOSE:  13 mL Gadavist     FINDINGS:  There is a small skin ulcer at the dorsal aspect of the second toe  distally. Mild soft tissue swelling of the second toe and dorsal  forefoot with enhancement is compatible with cellulitis. No discrete  fluid collection to suggest abscess.    There is diffuse increased T2/decreased T1 marrow signal involving the  second distal phalanx, with erosions/bone loss at the tuft. This  demonstrates enhancement on postcontrast sequences consistent with  acute osteomyelitis. No definite involvement of the middle phalanx.    No acute fracture or malalignment. Moderate third TMT joint  degenerative changes. Otherwise minimal to mild degenerative changes  throughout the forefoot. No significant joint effusion. Second through  fifth hammertoe deformities. Diffuse muscle atrophy in keeping with  diabetic neuropathy.      Impression    IMPRESSION:  1.  Acute osteomyelitis of the second distal phalanx with erosion/bone  loss at the tuft.  2.  Overlying skin ulcer with mild cellulitis. No abscess.  3.  Diffuse muscle atrophy in keeping with diabetic neuropathy.    KOREY SON MD   MR Foot Right w/o & w Contrast    Narrative    MR FOOT RIGHT W/O & W CONTRAST   11/10/2020 5:15 PM     HISTORY:  right foot wound, concern for infection or possible osteo    COMPARISON: Radiographs from 11/9/2020    DOSE:  13 mL Gadavist     FINDINGS:  Status post amputation of the second toe at the PIP joint. Status post  amputation of the third ray at the level of the distal metatarsal  diaphysis. Chronic deformities of the second and fourth proximal  phalanges. No  abnormal marrow signal or enhancement to suggest acute  osteomyelitis. No acute fracture or malalignment. Mild multifocal  degenerative changes at the TMT and IP joints, most pronounced at the  fourth TMT joint. No significant joint effusion.    There is soft tissue swelling around the second toe and MTP joints.  This demonstrates enhancement on postcontrast sequences consistent  with cellulitis. No discrete fluid collection to suggest abscess. No  definite ulcer is identified. Mild fluid in the tendon sheath at the  master knot of Luke. Diffuse muscle atrophy in keeping with diabetic  neuropathy.      Impression    IMPRESSION:  1.  No evidence of acute osteomyelitis.  2.  Cellulitis involving the second toe without abscess.  3.  Postsurgical changes of the second and third toes.  4.  Diffuse muscle atrophy in keeping with diabetic neuropathy.    KOREY SON MD   XR Toe Left G/E 2 Views    Narrative    LEFT TOE TWO OR MORE VIEWS  11/12/2020 10:45 AM     HISTORY:  Status post left second toe partial amputation.    COMPARISON: 11/9/2020      Impression    IMPRESSION: Second toe amputation at the PIP joint level.    MATHEW CANALES MD

## 2020-11-13 NOTE — PLAN OF CARE
A&Ox4. Up independent. regular diet. VSS on RA ex hypertension. Denies pain. IV SL. Dressing CDI, popliteal pulse WNL. patient likely discharge in AM. No other complaints or changes.

## 2020-11-17 NOTE — TELEPHONE ENCOUNTER
ED / Discharge Outreach Protocol  PT should have f/u with podiatry for post op.    Patient Contact    Attempt # 1    Was call answered?  No.  Left message on voicemail with information to call me back.  PAPI Mina

## 2020-11-18 NOTE — TELEPHONE ENCOUNTER
"Hospital/TCU/ED for chronic condition Discharge Protocol    \"Hi, my name is Monica Griffith RN, a registered nurse, and I am calling from [facility].  I am calling to follow up and see how things are going for you after your recent emergency visit/hospital/TCU stay.\"    Tell me how you are doing now that you are home?\" States he is doing well      Discharge Instructions    \"Let's review your discharge instructions.  What is/are the follow-up recommendations?  Pt. Response: Changing dressing daily, has f/iu appointment tomorrow with surgeon    \"Has an appointment with your primary care provider been scheduled?\"   Yes. (confirm)    \"When you see the provider, I would recommend that you bring your medications with you.\"    Medications    \"Tell me what changed about your medicines when you discharged?\"    Changes to chronic meds?    0-1    \"What questions do you have about your medications?\"    None     New diagnoses of heart failure, COPD, diabetes, or MI?    No              Post Discharge Medication Reconciliation Status: discharge medications reconciled, continue medications without change.    Was MTM referral placed (*Make sure to put transitions as reason for referral)?   No    Call Summary    \"What questions or concerns do you have about your recent visit and your follow-up care?\"     none    \"If you have questions or things don't continue to improve, we encourage you contact us through the main clinic number (give number).  Even if the clinic is not open, triage nurses are available 24/7 to help you.     We would like you to know that our clinic has extended hours (provide information).  We also have urgent care (provide details on closest location and hours/contact info)\"      \"Thank you for your time and take care!\"             "

## 2020-11-19 NOTE — PROGRESS NOTES
"Foot & Ankle Surgery  November 19, 2020    S:  Patient in today sp partial L 2nd toe amputation for diabetic wound/infeciton and osteomyelitis on 11/12/20.  Pain levels zero.  Full WB in surgical shoe.  Has been doing a dressing change every few days at home.      /64   Ht 1.905 m (6' 3\")   Wt 135.2 kg (298 lb)   BMI 37.25 kg/m        ROS - positive for CC.  Patient denies current nausea, vomiting, chills, fevers, belly pain, calf pain, chest pain or SOB.  Complete remainder of ROS is otherwise neg.    PE - sutures intact, skin margins well coapted. Slight peeling of skin along the incision.  Erythema/edema minimal today, minimal drainage, no necrosis.  Skin shows no trophic, color or temperature changes otherwise.  No calf redness, swelling or pain noted otherwise.    Imaging - IMPRESSION: Second toe amputation at the PIP joint level.    Pathology - FINAL DIAGNOSIS:   Resected left second toe with ulceration     Cultures -   Culture Micro Abnormal   Light growth   Finegoldia magna (Peptostreptococcus agus)   Susceptibility testing not routinely done     Culture Micro Abnormal   Light growth   Staphylococcus aureus     Culture Micro Abnormal   Light growth   Staphylococcus epidermidis   Susceptibility testing not routinely done          A/P - 70 year old yo patient approx 1 week sp above procedure  -redressed the foot; if the bandage is c/d/i, he can leave it alone  -continue heel wB in surgical shoe  -continue all post-op instructions; reviewed  -finish PO abx; no indication for new/extended course    Follow up  -  Next Tuesday or sooner with acute issues          Jase Rosales, MAVERICK FACFAS FACFAOM  Podiatric Foot & Ankle Surgeon  [place]  [phone]      "

## 2020-11-19 NOTE — PATIENT INSTRUCTIONS
Thank you for choosing [facility] / [facility]le Surgery!    DR. ORELLANA'S CLINIC LOCATIONS:   [place] [id number]   [place] [address]                        [place], [place] [id number] [phone]      SET UP SURGERY: [phone]   APPOINTMENTS: [phone]   BILLING QUESTIONS: [phone]   FAX NUMBER: [phone]     Follow up:     Next steps:     Please read through the following handouts and if you have any questions, please feel free to call us or send a [employer] message!            (BMI) Body Mass Index  Many things can cause foot and ankle problems. Foot structure, activity level, foot mechanics and injuries are common causes of pain.One very important issue that often goes unmentioned, is body weight.  Extra weight can cause increased stress on muscles, ligaments, bones and tendons. Sometimes just a few extra pounds is all it takes to put one over her/his threshold. Without reducing that stress, it can be difficult to alleviate pain. Some people are uncomfortable addressing this issue, but we feel it is important for you to think about it. As Foot & Ankle specialists, our job is addressing the lower extremity problem and possible causes. Regarding extra body weight, we encourage patients to discuss diet and weight management plans with their primary care doctors. It is this team approach that gives you the best opportunity for pain relief and getting you back on your feet.

## 2020-11-23 NOTE — PROGRESS NOTES
HPI:  Mauricio Barrera is a 70 year old male here for follow-up after CE/IOL both eyes. He is pleased with his improvement in his vision. No eye pain, redness, discharge. No concerning flashes/floaters.    POH: Cataracts, refractive error, PVD both eyes   PMH: HL, HTN, peripheral neuropathy    Assessment & Plan   (Z96.1) Pseudophakia, both eyes  (primary encounter diagnosis)  Comment: Good post-operative appearance. Mild PCO, not visually significant  Plan: Complete drop taper. Monitor PCO - discussed YAG laser if becomes bothersome.     (H43.813) Posterior vitreous detachment, bilateral  Comment: Followed by Dr. Germain  Plan: Discussed signs/sx of RT/RD and the patient knows to call immediately if they develop these symptoms.     (H52.203,  H52.13) Myopic astigmatism of both eyes\(H52.4) Presbyopia  Comment: Nice refractive result after CE/IOL  Plan: Ok to continue with OTC readers.      -----------------------------------------------------------------------------------    Patient disposition:   Return in about 1 year (around 11/23/2021). or sooner as needed.    Teaching statement:  Complete documentation of historical and exam elements from today's encounter can be found in the full encounter summary report (not reduplicated in this progress note). I personally obtained the chief complaint(s) and history of present illness.  I confirmed and edited as necessary the review of systems, past medical/surgical history, family history, social history, and examination findings as documented by others; and I examined the patient myself. I personally reviewed the relevant tests, images, and reports as documented above.     I formulated and edited as necessary the assessment and plan and discussed the findings and management plan with the patient and family.      Pat King MD  Comprehensive Ophthalmology & Ocular Pathology  Department of Ophthalmology and Visual Neurosciences  [email or web address]  Pager  [phone]

## 2020-11-23 NOTE — NURSING NOTE
Chief Complaint(s) and History of Present Illness(es)     Post Op (Ophthalmology) Both Eyes     In both eyes.  Associated symptoms include Negative for dryness, eye pain, redness and tearing.              Comments     1 month Post op s/p CE/IOL LE (10/23/20) and 2-3 week post op s/p CE/IOL RE (11/06/20). Pt notes vision is good, especially colors. Pt did  some OTC readers and liked +1.75.     Ocular meds:  Pred BID RE (white cap)    LUCERO Sunshine 2:32 PM November 23, 2020

## 2020-11-24 NOTE — PROGRESS NOTES
"Foot & Ankle Surgery  November 24, 2020    S:  Patient in today sp partial L 2nd toe amputatoin for diabetic wound/infection and osteomyelitis on 11/12/20.  Pain levels minimal.  No concerns    There were no vitals taken for this visit.      ROS - positive for CC.  Patient denies current nausea, vomiting, chills, fevers, belly pain, calf pain, chest pain or SOB.  Complete remainder of ROS is otherwise neg.    PE - sutures removed, slight dry eschar along incision, but no gapping/dehiscence and no drainage/SOI.  Skin shows no trophic, color or temperature changes otherwise.  No calf redness, swelling or pain noted otherwise.    A/P - 70 year old yo patient approx 2 weeks sp above procedure  -sutures removed, no s-s needed  -return to shoes/activities as tolerated, \"baby\" the surgical site with RICE/NSAID vs tylenol as aggressively as the symptoms dictate  -discussed 4 week follow up.  He would like to avoid driving in winter, but will MyChart with questions/concerns    Follow up  -  prn or sooner with acute issues      Jase Rosales, DAVYM FACFAS FACFAOM  Podiatric Foot & Ankle Surgeon  [facility]  [phone]      "

## 2021-01-01 ENCOUNTER — MYC MEDICAL ADVICE (OUTPATIENT)
Dept: FAMILY MEDICINE | Facility: CLINIC | Age: 71
End: 2021-01-01

## 2021-01-01 ENCOUNTER — IMMUNIZATION (OUTPATIENT)
Dept: NURSING | Facility: CLINIC | Age: 71
End: 2021-01-01
Payer: COMMERCIAL

## 2021-01-01 ENCOUNTER — IMMUNIZATION (OUTPATIENT)
Dept: NURSING | Facility: CLINIC | Age: 71
End: 2021-01-01
Attending: INTERNAL MEDICINE
Payer: COMMERCIAL

## 2021-01-01 DIAGNOSIS — E78.5 HYPERLIPIDEMIA WITH TARGET LDL LESS THAN 130: ICD-10-CM

## 2021-01-01 DIAGNOSIS — E78.5 HYPERLIPIDEMIA WITH TARGET LDL LESS THAN 130: Primary | ICD-10-CM

## 2021-01-01 DIAGNOSIS — I10 HYPERTENSION GOAL BP (BLOOD PRESSURE) < 140/90: ICD-10-CM

## 2021-01-01 DIAGNOSIS — R73.01 IMPAIRED FASTING GLUCOSE: ICD-10-CM

## 2021-01-01 DIAGNOSIS — R73.01 IMPAIRED FASTING GLUCOSE: Primary | ICD-10-CM

## 2021-01-01 LAB
CHOLEST SERPL-MCNC: 162 MG/DL
HBA1C MFR BLD: 6 % (ref 0–5.6)
HDLC SERPL-MCNC: 38 MG/DL
LDLC SERPL CALC-MCNC: 60 MG/DL
NONHDLC SERPL-MCNC: 124 MG/DL
TRIGL SERPL-MCNC: 320 MG/DL

## 2021-01-01 PROCEDURE — 83036 HEMOGLOBIN GLYCOSYLATED A1C: CPT | Performed by: FAMILY MEDICINE

## 2021-01-01 PROCEDURE — 0001A PR COVID VAC PFIZER DIL RECON 30 MCG/0.3 ML IM: CPT

## 2021-01-01 PROCEDURE — 0002A PR COVID VAC PFIZER DIL RECON 30 MCG/0.3 ML IM: CPT

## 2021-01-01 PROCEDURE — 91300 PR COVID VAC PFIZER DIL RECON 30 MCG/0.3 ML IM: CPT

## 2021-01-01 PROCEDURE — 36415 COLL VENOUS BLD VENIPUNCTURE: CPT | Performed by: FAMILY MEDICINE

## 2021-01-01 PROCEDURE — 80061 LIPID PANEL: CPT | Performed by: FAMILY MEDICINE

## 2021-01-01 RX ORDER — METFORMIN HCL 500 MG
500 TABLET, EXTENDED RELEASE 24 HR ORAL 2 TIMES DAILY WITH MEALS
Qty: 180 TABLET | Refills: 1 | Status: SHIPPED | OUTPATIENT
Start: 2021-01-01

## 2021-01-01 RX ORDER — OLMESARTAN MEDOXOMIL 20 MG/1
20 TABLET ORAL 2 TIMES DAILY
Qty: 180 TABLET | Refills: 1 | Status: SHIPPED | OUTPATIENT
Start: 2021-01-01

## 2021-01-01 RX ORDER — SIMVASTATIN 20 MG
20 TABLET ORAL AT BEDTIME
Qty: 90 TABLET | Refills: 1 | Status: SHIPPED | OUTPATIENT
Start: 2021-01-01

## 2021-01-01 RX ORDER — PRAZOSIN HYDROCHLORIDE 5 MG/1
5 CAPSULE ORAL 2 TIMES DAILY
Qty: 180 CAPSULE | Refills: 0 | Status: SHIPPED | OUTPATIENT
Start: 2021-01-01 | End: 2021-01-01

## 2021-01-26 NOTE — TELEPHONE ENCOUNTER
Dr Hinojosa - Do you need patient to do a virtual visit with patient to discuss his current blood pressure meds?  Candie Merrill RN  [facility]

## 2021-01-26 NOTE — TELEPHONE ENCOUNTER
No need for visit.   Just ask him to clarify what medications he is on right now and OK to send 6 months rx of each to his preferred pharmacy.

## 2021-02-02 NOTE — TELEPHONE ENCOUNTER
Writer responded via [place].  LYLE JovelN, RN  [facility]

## 2021-04-12 NOTE — PROGRESS NOTES
Left message to call back and ask to speak with an available triage nurse.  LYLE JovelN, RN  [facility]

## 2021-04-12 NOTE — PROGRESS NOTES
Patient sent a letter stating switching his rx to XR - switched metformin to xr.   He is overdue for his A1c check and he should come in for lab only appt. Future order signed.

## 2021-04-13 NOTE — TELEPHONE ENCOUNTER
Prescription approved per [facility] Refill Protocol.  LYLE JovelN, RN  [facility]

## 2021-04-15 NOTE — RESULT ENCOUNTER NOTE
Elieser Martínez,  Thanks for coming to clinic.  The clinician who ordered these tests is currently out of the office, but we wanted to let you know that your results have been reviewed and everything looks fine.  Your clinician may get back to you with further information and a specific care plan for you when they return.      Please let us know if you have any questions or concerns.      Marichuy Hernandez MD / [employer] [facility]  [phone]

## 2021-04-16 NOTE — RESULT ENCOUNTER NOTE
Mauricio,    The results of your recent lipid (cholesterol) profile were abnormal.     Desired or goal lipid levels are:  CHOLESTEROL: Desirable is less than 200.  HDL (Good Cholesterol): Desirable is greater than 40 in men and greater than 50 in women.  LDL (Bad Cholesterol): Desirable is less than 130 or less than 100 in patients with diabetes or vascular disease. For some patients with diabetes or vascular disease, the desireable LDL is less than 70.  TRIGLYCERIDES: Desirable is less than 150.      As you may know, an elevated cholesterol is one factor that increases your risk for heart disease and stroke.  Cholesterol can be somewhat influenced by diet but there is a large genetic component as well.    The Mediterranean diet has some evidence for improving cholesterol and reducing cardiovascular risk.  Such a diet is typically high in fruits, vegetables, whole grains, beans, nuts, and seeds and includes olive oil as an important source of fat; there are typically low to moderate amounts of fish, poultry, and dairy products, and there is little red meat.    Also consider starting or increasing your aerobic activity.  Aerobic activity is the best way to improve HDL (good) cholesterol.  If this would be new to you, please talk with me first about what activities are safe for you.      - Shoshana Richardson CNP (covering for Dr. Hinojosa who is currently out of the office)

## 2021-04-19 NOTE — TELEPHONE ENCOUNTER
Dear Dr. Hinojosa and Shoshana,     Thank you for the prompt test results and analysis.  From what I am seeing, it is my triglycerides that are off the chart while the rest of the lipids are in the normal range with one of them off by 1 point.  Is there a med I should take for those high triglyceride counts?  Also, my fasting glucose is in the middle of the pre-diabetic range.  I am currently taking metformin 500 morning and night.  Should I increase that dosage to lower the glucose number further?     Thanks.     MORGAN

## 2021-09-08 NOTE — TELEPHONE ENCOUNTER
"Mauricio Barrera is a 66 year old male who calls with abdominal pain.    NURSING ASSESSMENT:  Description:  Patient started with abdominal pain today - diarrhea and dry heaves - is calling nurse triage for advice - states he is currently in home resting took some pepto bismol    Onset/duration:  10/24/2017 but abdominal discomfort 6 weeks  Precip. factors:  Patient states hx gas/bloating  Associated symptoms:    1. Abdominal pain  - lower below belly button - center -  \"last 6 weeks - and it is related to gas - it is a stronger pain - more than it has been in my life before\" - currently 0/10 - today had an episode of pain that results in diarrhea/dry heaves  2. Diarrhea - watery stools - denies black, tarry, bloody stool x 3-5  3. Dry heaves x 3 - had not eaten anything  4. Weakness - able to stand, walk to bathroom, no blurry vision or feeling like he may faint  5. Possible some chills - has not checked   6. Have been eating at home recently - him and wife have eaten the same things - she is experiencing no symptoms  7. States adequate fluid intake the last 24 hours      Last exam/Treatment:  10/5/2017  Allergies:   Allergies   Allergen Reactions     No Known Drug Allergies        MEDICATIONS:   Taking medication(s) as prescribed? N/A  Taking over the counter medication(s?) No  Any medication side effects? No significant side effects    Any barriers to taking medication(s) as prescribed?  No  Medication(s) improving/managing symptoms?  N/A  Medication reconciliation completed: No      NURSING PLAN: Nursing advice to patient since has been having increased abdominal symptoms over last 6 weeks on daily basis - advised 24 hours office visit - scheduled this afternoon - home care fluids, electrolytes, clear liquid diet then BRAT style diet, - Then regular - avoid fried, spicy, raw fruit/veggie - patient verbalized understanding - no further questions at this time - to ED/911 for suddent worsening pain, " weakness/faint    RECOMMENDED DISPOSITION:  See in 24 hours - see above  Will comply with recommendation: Yes  If further questions/concerns or if symptoms do not improve, worsen or new symptoms develop, call your PCP or [facility] as soon as possible.      Guideline used:  Telephone Triage Protocols for Nurses, Fifth Edition, Mandy Tao RN     No

## (undated) DEVICE — SU PROLENE 3-0 PS-2 18" 8687H

## (undated) DEVICE — EYE CANN IRR 25GA CYSTOTOME 581610

## (undated) DEVICE — EYE PACK CUSTOM ANTERIOR 30DEG TIP CENTURION PPK6682-04

## (undated) DEVICE — PREP SKIN SCRUB TRAY 4461A

## (undated) DEVICE — LINEN TOWEL PACK X5 5464

## (undated) DEVICE — EYE CANN IRR 27GA ANTERIOR CHAMBER 581280

## (undated) DEVICE — EYE KNIFE SLIT XSTAR VISITEC 2.6MM 45DEG 373726

## (undated) DEVICE — EYE TIP IRRIGATION & ASPIRATION POLYMER CVD 0.3MM 8065751512

## (undated) DEVICE — EYE SHIELD PLASTIC

## (undated) DEVICE — DRAPE STERI TOWEL LG 1010

## (undated) DEVICE — SU ETHILON 4-0 PS-4 18" 1662G

## (undated) DEVICE — IMM LIMB ELEVATOR DC40-0203

## (undated) DEVICE — EYE KNIFE STILETTO VISITEC 1.1MM ANG 45DEG SIDEPORT 376620

## (undated) DEVICE — NDL 25GA 1.5" 305127

## (undated) DEVICE — PACK EXTREMITY SOP15EXFSD

## (undated) DEVICE — DRSG ADAPTIC 3X8" 6113

## (undated) DEVICE — SOL WATER IRRIG 1000ML BOTTLE 2F7114

## (undated) DEVICE — ESU GROUND PAD UNIVERSAL W/O CORD

## (undated) DEVICE — PACK CATARACT CUSTOM ASC SEY15CPUMC

## (undated) DEVICE — ESU PENCIL W/HOLSTER E2350H

## (undated) DEVICE — GLOVE PROTEXIS MICRO 6.5  2D73PM65

## (undated) DEVICE — GLOVE PROTEXIS POWDER FREE 7.5 ORTHOPEDIC 2D73ET75

## (undated) DEVICE — DRSG KERLIX FLUFFS X5

## (undated) DEVICE — DRSG KERLIX 4 1/2"X4YDS ROLL 6715

## (undated) DEVICE — NDL 19GA 1.5"

## (undated) DEVICE — BNDG ELASTIC 4"X5YDS STERILE 6611-4S

## (undated) RX ORDER — LIDOCAINE HYDROCHLORIDE 10 MG/ML
INJECTION, SOLUTION INFILTRATION; PERINEURAL
Status: DISPENSED
Start: 2020-01-01

## (undated) RX ORDER — BUPIVACAINE HYDROCHLORIDE 5 MG/ML
INJECTION, SOLUTION EPIDURAL; INTRACAUDAL
Status: DISPENSED
Start: 2020-01-01

## (undated) RX ORDER — ONDANSETRON 2 MG/ML
INJECTION INTRAMUSCULAR; INTRAVENOUS
Status: DISPENSED
Start: 2020-01-01

## (undated) RX ORDER — LIDOCAINE HYDROCHLORIDE 20 MG/ML
INJECTION, SOLUTION EPIDURAL; INFILTRATION; INTRACAUDAL; PERINEURAL
Status: DISPENSED
Start: 2020-01-01

## (undated) RX ORDER — PROPOFOL 10 MG/ML
INJECTION, EMULSION INTRAVENOUS
Status: DISPENSED
Start: 2020-01-01

## (undated) RX ORDER — FENTANYL CITRATE 50 UG/ML
INJECTION, SOLUTION INTRAMUSCULAR; INTRAVENOUS
Status: DISPENSED
Start: 2020-01-01